# Patient Record
Sex: MALE | Race: BLACK OR AFRICAN AMERICAN | NOT HISPANIC OR LATINO | Employment: FULL TIME | URBAN - METROPOLITAN AREA
[De-identification: names, ages, dates, MRNs, and addresses within clinical notes are randomized per-mention and may not be internally consistent; named-entity substitution may affect disease eponyms.]

---

## 2023-11-09 ENCOUNTER — APPOINTMENT (OUTPATIENT)
Dept: RADIOLOGY | Facility: HOSPITAL | Age: 53
DRG: 690 | End: 2023-11-09

## 2023-11-09 ENCOUNTER — HOSPITAL ENCOUNTER (INPATIENT)
Facility: HOSPITAL | Age: 53
LOS: 3 days | Discharge: HOME | DRG: 690 | End: 2023-11-13
Attending: EMERGENCY MEDICINE | Admitting: INTERNAL MEDICINE

## 2023-11-09 DIAGNOSIS — R91.8 LUNG NODULES: ICD-10-CM

## 2023-11-09 DIAGNOSIS — K74.60 HEPATIC CIRRHOSIS, UNSPECIFIED HEPATIC CIRRHOSIS TYPE, UNSPECIFIED WHETHER ASCITES PRESENT (MULTI): ICD-10-CM

## 2023-11-09 DIAGNOSIS — E11.9 INSULIN DEPENDENT TYPE 2 DIABETES MELLITUS (MULTI): ICD-10-CM

## 2023-11-09 DIAGNOSIS — Z79.4 INSULIN DEPENDENT TYPE 2 DIABETES MELLITUS (MULTI): ICD-10-CM

## 2023-11-09 DIAGNOSIS — N49.2 CELLULITIS OF SCROTUM: ICD-10-CM

## 2023-11-09 DIAGNOSIS — I10 PRIMARY HYPERTENSION: ICD-10-CM

## 2023-11-09 DIAGNOSIS — N34.0 URETHRAL ABSCESS: Primary | ICD-10-CM

## 2023-11-09 DIAGNOSIS — E78.5 HYPERLIPIDEMIA, UNSPECIFIED HYPERLIPIDEMIA TYPE: ICD-10-CM

## 2023-11-09 LAB
ALBUMIN SERPL BCP-MCNC: 4.1 G/DL (ref 3.4–5)
ALP SERPL-CCNC: 79 U/L (ref 33–120)
ALT SERPL W P-5'-P-CCNC: 13 U/L (ref 10–52)
ANION GAP BLDV CALCULATED.4IONS-SCNC: 9 MMOL/L (ref 10–25)
ANION GAP SERPL CALC-SCNC: 18 MMOL/L (ref 10–20)
APPEARANCE UR: ABNORMAL
AST SERPL W P-5'-P-CCNC: 9 U/L (ref 9–39)
BASE EXCESS BLDV CALC-SCNC: 2.3 MMOL/L (ref -2–3)
BASOPHILS # BLD AUTO: 0.07 X10*3/UL (ref 0–0.1)
BASOPHILS NFR BLD AUTO: 0.5 %
BILIRUB SERPL-MCNC: 0.5 MG/DL (ref 0–1.2)
BILIRUB UR STRIP.AUTO-MCNC: NEGATIVE MG/DL
BODY TEMPERATURE: 37 DEGREES CELSIUS
BUN SERPL-MCNC: 18 MG/DL (ref 6–23)
CA-I BLDV-SCNC: 1.17 MMOL/L (ref 1.1–1.33)
CALCIUM SERPL-MCNC: 9.2 MG/DL (ref 8.6–10.6)
CHLORIDE BLDV-SCNC: 98 MMOL/L (ref 98–107)
CHLORIDE SERPL-SCNC: 99 MMOL/L (ref 98–107)
CO2 SERPL-SCNC: 20 MMOL/L (ref 21–32)
COLOR UR: YELLOW
CREAT SERPL-MCNC: 1.05 MG/DL (ref 0.5–1.3)
EOSINOPHIL # BLD AUTO: 0.12 X10*3/UL (ref 0–0.7)
EOSINOPHIL NFR BLD AUTO: 0.9 %
ERYTHROCYTE [DISTWIDTH] IN BLOOD BY AUTOMATED COUNT: 14 % (ref 11.5–14.5)
GFR SERPL CREATININE-BSD FRML MDRD: 85 ML/MIN/1.73M*2
GLUCOSE BLDV-MCNC: 324 MG/DL (ref 74–99)
GLUCOSE SERPL-MCNC: 284 MG/DL (ref 74–99)
GLUCOSE UR STRIP.AUTO-MCNC: ABNORMAL MG/DL
HCO3 BLDV-SCNC: 27.2 MMOL/L (ref 22–26)
HCT VFR BLD AUTO: 47.3 % (ref 41–52)
HCT VFR BLD EST: 48 % (ref 41–52)
HGB BLD-MCNC: 15.3 G/DL (ref 13.5–17.5)
HGB BLDV-MCNC: 16 G/DL (ref 13.5–17.5)
HOLD SPECIMEN: NORMAL
IMM GRANULOCYTES # BLD AUTO: 0.08 X10*3/UL (ref 0–0.7)
IMM GRANULOCYTES NFR BLD AUTO: 0.6 % (ref 0–0.9)
KETONES UR STRIP.AUTO-MCNC: ABNORMAL MG/DL
LACTATE BLDV-SCNC: 1.3 MMOL/L (ref 0.4–2)
LEUKOCYTE ESTERASE UR QL STRIP.AUTO: ABNORMAL
LYMPHOCYTES # BLD AUTO: 2.7 X10*3/UL (ref 1.2–4.8)
LYMPHOCYTES NFR BLD AUTO: 20.8 %
MCH RBC QN AUTO: 26 PG (ref 26–34)
MCHC RBC AUTO-ENTMCNC: 32.3 G/DL (ref 32–36)
MCV RBC AUTO: 80 FL (ref 80–100)
MONOCYTES # BLD AUTO: 1.28 X10*3/UL (ref 0.1–1)
MONOCYTES NFR BLD AUTO: 9.9 %
NEUTROPHILS # BLD AUTO: 8.7 X10*3/UL (ref 1.2–7.7)
NEUTROPHILS NFR BLD AUTO: 67.3 %
NITRITE UR QL STRIP.AUTO: NEGATIVE
NRBC BLD-RTO: 0 /100 WBCS (ref 0–0)
OXYHGB MFR BLDV: 71.5 % (ref 45–75)
PCO2 BLDV: 42 MM HG (ref 41–51)
PH BLDV: 7.42 PH (ref 7.33–7.43)
PH UR STRIP.AUTO: 6 [PH]
PLATELET # BLD AUTO: 198 X10*3/UL (ref 150–450)
PO2 BLDV: 46 MM HG (ref 35–45)
POTASSIUM BLDV-SCNC: 3.9 MMOL/L (ref 3.5–5.3)
POTASSIUM SERPL-SCNC: 4.1 MMOL/L (ref 3.5–5.3)
PROT SERPL-MCNC: 6.9 G/DL (ref 6.4–8.2)
PROT UR STRIP.AUTO-MCNC: ABNORMAL MG/DL
RBC # BLD AUTO: 5.88 X10*6/UL (ref 4.5–5.9)
RBC # UR STRIP.AUTO: ABNORMAL /UL
RBC #/AREA URNS AUTO: >20 /HPF
SAO2 % BLDV: 79 % (ref 45–75)
SODIUM BLDV-SCNC: 130 MMOL/L (ref 136–145)
SODIUM SERPL-SCNC: 133 MMOL/L (ref 136–145)
SP GR UR STRIP.AUTO: 1.03
SQUAMOUS #/AREA URNS AUTO: ABNORMAL /HPF
UROBILINOGEN UR STRIP.AUTO-MCNC: <2 MG/DL
WBC # BLD AUTO: 13 X10*3/UL (ref 4.4–11.3)
WBC #/AREA URNS AUTO: >50 /HPF

## 2023-11-09 PROCEDURE — 81001 URINALYSIS AUTO W/SCOPE: CPT | Performed by: STUDENT IN AN ORGANIZED HEALTH CARE EDUCATION/TRAINING PROGRAM

## 2023-11-09 PROCEDURE — 99285 EMERGENCY DEPT VISIT HI MDM: CPT

## 2023-11-09 PROCEDURE — 82947 ASSAY GLUCOSE BLOOD QUANT: CPT

## 2023-11-09 PROCEDURE — 85025 COMPLETE CBC W/AUTO DIFF WBC: CPT

## 2023-11-09 PROCEDURE — 36415 COLL VENOUS BLD VENIPUNCTURE: CPT

## 2023-11-09 PROCEDURE — 74177 CT ABD & PELVIS W/CONTRAST: CPT

## 2023-11-09 PROCEDURE — 96367 TX/PROPH/DG ADDL SEQ IV INF: CPT

## 2023-11-09 PROCEDURE — 87800 DETECT AGNT MULT DNA DIREC: CPT

## 2023-11-09 PROCEDURE — 96365 THER/PROPH/DIAG IV INF INIT: CPT

## 2023-11-09 PROCEDURE — 84295 ASSAY OF SERUM SODIUM: CPT

## 2023-11-09 PROCEDURE — 74177 CT ABD & PELVIS W/CONTRAST: CPT | Performed by: STUDENT IN AN ORGANIZED HEALTH CARE EDUCATION/TRAINING PROGRAM

## 2023-11-09 PROCEDURE — 82330 ASSAY OF CALCIUM: CPT

## 2023-11-09 PROCEDURE — 2550000001 HC RX 255 CONTRASTS: Performed by: EMERGENCY MEDICINE

## 2023-11-09 PROCEDURE — 2500000001 HC RX 250 WO HCPCS SELF ADMINISTERED DRUGS (ALT 637 FOR MEDICARE OP)

## 2023-11-09 PROCEDURE — 87536 HIV-1 QUANT&REVRSE TRNSCRPJ: CPT

## 2023-11-09 PROCEDURE — 99285 EMERGENCY DEPT VISIT HI MDM: CPT | Mod: 25 | Performed by: EMERGENCY MEDICINE

## 2023-11-09 PROCEDURE — 2500000004 HC RX 250 GENERAL PHARMACY W/ HCPCS (ALT 636 FOR OP/ED): Performed by: EMERGENCY MEDICINE

## 2023-11-09 PROCEDURE — 96366 THER/PROPH/DIAG IV INF ADDON: CPT

## 2023-11-09 PROCEDURE — 87086 URINE CULTURE/COLONY COUNT: CPT | Performed by: STUDENT IN AN ORGANIZED HEALTH CARE EDUCATION/TRAINING PROGRAM

## 2023-11-09 PROCEDURE — 2500000004 HC RX 250 GENERAL PHARMACY W/ HCPCS (ALT 636 FOR OP/ED)

## 2023-11-09 RX ORDER — MORPHINE SULFATE 4 MG/ML
4 INJECTION INTRAVENOUS ONCE
Status: COMPLETED | OUTPATIENT
Start: 2023-11-09 | End: 2023-11-10

## 2023-11-09 RX ORDER — VANCOMYCIN HYDROCHLORIDE 1 G/200ML
2000 INJECTION, SOLUTION INTRAVENOUS ONCE
Status: COMPLETED | OUTPATIENT
Start: 2023-11-09 | End: 2023-11-10

## 2023-11-09 RX ORDER — OXYCODONE AND ACETAMINOPHEN 5; 325 MG/1; MG/1
1 TABLET ORAL ONCE
Status: COMPLETED | OUTPATIENT
Start: 2023-11-09 | End: 2023-11-09

## 2023-11-09 RX ADMIN — PIPERACILLIN SODIUM AND TAZOBACTAM SODIUM 4.5 G: 4; .5 INJECTION, SOLUTION INTRAVENOUS at 20:55

## 2023-11-09 RX ADMIN — SODIUM CHLORIDE 1000 ML: 9 INJECTION, SOLUTION INTRAVENOUS at 20:30

## 2023-11-09 RX ADMIN — IOHEXOL 100 ML: 350 INJECTION, SOLUTION INTRAVENOUS at 23:13

## 2023-11-09 RX ADMIN — OXYCODONE HYDROCHLORIDE AND ACETAMINOPHEN 1 TABLET: 5; 325 TABLET ORAL at 19:01

## 2023-11-09 RX ADMIN — VANCOMYCIN HYDROCHLORIDE 2000 MG: 1 INJECTION, SOLUTION INTRAVENOUS at 22:14

## 2023-11-09 ASSESSMENT — COLUMBIA-SUICIDE SEVERITY RATING SCALE - C-SSRS
1. IN THE PAST MONTH, HAVE YOU WISHED YOU WERE DEAD OR WISHED YOU COULD GO TO SLEEP AND NOT WAKE UP?: NO
6. HAVE YOU EVER DONE ANYTHING, STARTED TO DO ANYTHING, OR PREPARED TO DO ANYTHING TO END YOUR LIFE?: NO
2. HAVE YOU ACTUALLY HAD ANY THOUGHTS OF KILLING YOURSELF?: NO

## 2023-11-09 NOTE — ED TRIAGE NOTES
Reports burning during urination, increased frequency, pain under scrotum. Pt states he had a procedure to remove an abscess in his urethra. Denies concern for STDs.

## 2023-11-09 NOTE — ED PROVIDER NOTES
"CC: UTI symptoms  HPI:   Elliot Earl is a 53 y.o. year old male patient with PMH of epididymal abscess/perianal cellulitis and abscess with frequent UTI's, insulin dependent T2DM, HTN, HLD whom presents to the ED with 5 day history of UTI symptoms and progressively worsening left scrotal pain. More specifically, the patient complains of increased urinary frequency, burning dysuria and foul-smelling urine. He reports that he has been urinating every 2 hours. No associated fever, chills, abdominal or flank pain, hematuria, urinary retention, weak flow or hesitancy. No headache, dizziness, vision changes, neck pain, chest pain, shortness of breath, vomiting, or diarrhea. No numbness or tingling. He states no concerns for STD's as he has only been sexually active with his wife of 20 years. Denies any urethral discharge or penile pain, genital sores, rashes.    He reports that treatment with tylenol is ineffective, last dose was at 0600 this AM. Patient expressed concern as he has had similar symptoms in the past and was ultimately diagnosed with a urethral abscess (2-3 years ago) which has continued to \"intermittently flare-up over the past couple years. He has been actively following with urology, Dr. Quiroz for this issue, next follow-up appointment is scheduled on 12/11/23.      PMHx:  PSH: reviewed per EMR  Allergies: Reviewed per EMR  Medications: Reviewed per EMR  FH: Noncontributory  Social Hx: Denies tobacco. Denies EtOH, illicit substance use.      ROS: All other systems were reviewed and negative.    Physical Exam:   GENERAL: Patient is well-appearing, cooperative. Vitals noted, NAD. Afebrile  HEAD: NC/AT. Neck is supple, full ROM intact.  EENMT: PERRL. EOMI, anicteric sclera. Hearing grossly intact. MMM. Normal phonation.  CV: Regular rate & rhythm. S1/S2 present. No murmur, gallops or rubs.  PULM: CTAB with normal respiratory effort. No wheezes, rales or rhonchi.  ABDOMEN: Soft, NTTP, " non-distended. No peritoneal signs. BS+ x 4. No HSM or pulsatile masses.  : Circumcised male with yellow purulent discharge actively draining from the urethra. (+) Tenderness to palpation and very slight swelling to left perianal region in between scrotum and rectum; no areas of induration appreciated. Penile shaft is normal without rash, lesions or TTP. Vertical, nontender testicles. No epididymal tenderness or swelling appreciated. Cremasteric reflex present. Phren's sign is negative. No inguinal lymphadenopathy or masses. No hernias. No ulcerations, lesions, rash. No external hemorrhoids seen.  EXTREMITIES: WWP, 2+ bilateral RPs and DPPs. No pitting LE edema.  SKIN: Intact. Cap refill <2s.  NEURO: AA&Ox3, Speech fluent. No focal deficits identified. Normal gait, Spontaneously ROYAL x 4. Answering questions appropriately.       Relevant Labs and Imaging Results  Labs Reviewed   URINALYSIS WITH REFLEX MICROSCOPIC AND CULTURE - Abnormal       Result Value    Color, Urine Yellow      Appearance, Urine Hazy (*)     Specific Gravity, Urine 1.032      pH, Urine 6.0      Protein, Urine 30 (1+) (*)     Glucose, Urine >=500 (3+) (*)     Blood, Urine LARGE (3+) (*)     Ketones, Urine 20 (1+) (*)     Bilirubin, Urine NEGATIVE      Urobilinogen, Urine <2.0      Nitrite, Urine NEGATIVE      Leukocyte Esterase, Urine LARGE (3+) (*)    MICROSCOPIC ONLY, URINE - Abnormal    WBC, Urine >50 (*)     RBC, Urine >20 (*)     Squamous Epithelial Cells, Urine 1-9 (SPARSE)     COMPREHENSIVE METABOLIC PANEL - Abnormal    Glucose 284 (*)     Sodium 133 (*)     Potassium 4.1      Chloride 99      Bicarbonate 20 (*)     Anion Gap 18      Urea Nitrogen 18      Creatinine 1.05      eGFR 85      Calcium 9.2      Albumin 4.1      Alkaline Phosphatase 79      Total Protein 6.9      AST 9      Bilirubin, Total 0.5      ALT 13     CBC WITH AUTO DIFFERENTIAL - Abnormal    WBC 13.0 (*)     nRBC 0.0      RBC 5.88      Hemoglobin 15.3      Hematocrit  47.3      MCV 80      MCH 26.0      MCHC 32.3      RDW 14.0      Platelets 198      Neutrophils % 67.3      Immature Granulocytes %, Automated 0.6      Lymphocytes % 20.8      Monocytes % 9.9      Eosinophils % 0.9      Basophils % 0.5      Neutrophils Absolute 8.70 (*)     Immature Granulocytes Absolute, Automated 0.08      Lymphocytes Absolute 2.70      Monocytes Absolute 1.28 (*)     Eosinophils Absolute 0.12      Basophils Absolute 0.07     BLOOD GAS VENOUS FULL PANEL UNSOLICITED - Abnormal    POCT pH, Venous 7.42      POCT pCO2, Venous 42      POCT pO2, Venous 46 (*)     POCT SO2, Venous 79 (*)     POCT Oxy Hemoglobin, Venous 71.5      POCT Hematocrit Calculated, Venous 48.0      POCT Sodium, Venous 130 (*)     POCT Potassium, Venous 3.9      POCT Chloride, Venous 98      POCT Ionized Calicum, Venous 1.17      POCT Glucose, Venous 324 (*)     POCT Lactate, Venous 1.3      POCT Base Excess, Venous 2.3      POCT HCO3 Calculated, Venous 27.2 (*)     POCT Hemoglobin, Venous 16.0      POCT Anion Gap, Venous 9.0 (*)     Patient Temperature 37.0     URINE CULTURE   URINALYSIS WITH REFLEX MICROSCOPIC AND CULTURE    Narrative:     The following orders were created for panel order Urinalysis with Reflex Microscopic and Culture.  Procedure                               Abnormality         Status                     ---------                               -----------         ------                     Urinalysis with Reflex M...[[id number]]  Abnormal            Final result               Extra Urine Gray Tube[[id number]]                            Final result                 Please view results for these tests on the individual orders.   EXTRA URINE GRAY TUBE    Extra Tube Hold for add-ons.     C. TRACHOMATIS + N. GONORRHOEAE, AMPLIFIED   TRICHOMONAS WET PREP REFLEX TO PCR   BLOOD GAS VENOUS FULL PANEL     CT abdomen pelvis w IV contrast  Narrative: STUDY:  CT ABDOMEN PELVIS W IV CONTRAST;  11/9/2023 11:13 pm     "  INDICATION:  Signs/Symptoms:UTI symptoms and urethral discharge with history of  epidydmal, perianal and perirectal abscess. Concerning for abscess.      Per EMR: 5 day history of urinary symptoms with progressively  worsening left scrotal pain. Patient was diagnosed with a urethral  abscess about 2-3 years ago, which has continued to \"intermittently  flare up over the past couple years\" . Physical exam: Yellow purulent  discharge actively draining from the urethra. Tenderness to palpation  with slight swelling to the left perianal region between the scrotum  and rectum, concerning for abscess and/or cellulitis.      COMPARISON:  CT abdomen and pelvis with IV contrast dated 10/17/2022 and  11/06/2021.      ACCESSION NUMBER(S):  [id number]      ORDERING CLINICIAN:  ALICE JESUS      TECHNIQUE:  Contiguous axial images of the abdomen and pelvis were obtained after  the intravenous administration of 100 mL Omnipaque 350 contrast.  Coronal and sagittal reformatted images were reconstructed from the  axial data.      FINDINGS:  LOWER CHEST: No acute abnormality.      ABDOMEN/PELVIS:      ABDOMINAL WALL: No significant abnormality.      LIVER: No significant parenchymal perfusion abnormality. Diffuse  decreased attenuation of the hepatic parenchyma, consistent with  hepatic steatosis. The liver demonstrates a slightly nodular contour.      BILE DUCTS: No significant intrahepatic or extrahepatic dilatation.      GALLBLADDER: No significant abnormality.      PANCREAS: No significant abnormality.      SPLEEN: No significant abnormality. Note is made of an accessory  splenule.      ADRENALS: No significant abnormality.      KIDNEYS, URETERS, BLADDER: No significant abnormality.      REPRODUCTIVE ORGANS: No significant abnormality. There is linear  hypodense fluid collection at the base of the penis measuring about  4.1 x 0.6 cm (series 201, image 162). There is a similar additional  focus more distally measuring about " 2.6 x 0.8 cm (series 201, image  164). These findings were not present on prior exam from 10/17/2022,  however were similar to the findings seen CT abdomen and pelvis dated  11/06/2021. There is no evidence of internal air. There is a mild  component of associated left perineal subcutaneous stranding and  edema. Additionally, the scrotal skin appears thickened, particularly  on the left up to about 0.7 cm. There is a fluid collection within  the scrotum, separate from the testicles, measuring about 3.6 x 1.9  cm. There is a rim enhancing wall surrounding this collection. This  finding was not present on prior exam is from 10/17/2022 and  11/06/2021.      VESSELS: No significant abnormality.      RETROPERITONEUM/LYMPH NODES: No enlarged lymph nodes. No acute  retroperitoneal abnormality.      BOWEL/MESENTERY/PERITONEUM:  No inflammatory bowel wall thickening or  dilatation. Normal appendix. No ascites, free air, or intraperitoneal  fluid collection.      MUSCULOSKELETAL: No acute osseous abnormality. Mild degenerative  changes of the visualized thoracolumbar spine.      Impression: 1. Findings consistent with a urethral abscess at the base of the  penis. There is an additional hypodense focus more distally, with  suggestion of communication between the 2 foci. There are associated  inflammatory changes of the left perineum.  2. Thickening of the scrotal skin, particularly on the left. Findings  are concerning for component of scrotal cellulitis.  3. Rim enhancing scrotal fluid collection, concerning for a component  of intra-scrotal abscess.          I personally reviewed the images/study and I agree with the findings  as stated by Antonio Masterson MD. This study was interpreted at  [facility], [place], [place]          Dictation workstation:   [id number]      -------------------------------------------------------------------------------------------    Medical Decision Making  Triage  notes and vital signs were reviewed. History and physical exam were performed and the patient was staffed with the attending, Dr. Maura Nunez. I also reviewed recent and relevant outside records for thorough HPI.    Assessment & Plan/ED Course:   This is a 53 year old male presenting with 5 day history of UTI symptoms and scrotal pain concerning for urethritis.   VSS on arrival. Patient is visibly uncomfortable while sitting upright in exam chair secondary to scrotal pain. He is otherwise non-toxic appearing and in no acute distress. Physical exam revealed benign abdomen with no CVAT or palpable hernias.  exam performed with chaperone present revealed  yellow purulent discharge actively draining from the urethral as well as TTP with slight swelling to the left perianal region between his scrotum and rectum concerning for abscess and/or cellulitis. Penis, shaft and epididymis were otherwise normal and non-tender to palpation.   Differential diagnoses considered include: Acute epididymitis, Prostatitis, urinary tract infection, perianal abscecss, STD urethritis. Low suspicion for testicular torsion given presence of cremasteric reflex and absence of any testicular pain on exam. Although patient is at low risk for STI, I am concerned for possible GC/Chlamydia given presence of purulent urethral discharge noted on examination. After shannan discussion, patient is agreeable to STI urine testing. Urology consulted for recommendations due to concern for recurrent perirectal/perianal/epididymal abscess. He was given PO percocet for pain control. After discussion of case with ED attending, Dr. Maura Nunez, patient was also started on IV Vancomycin and Zosyn due to concerns for scrotal cellulitis and risk for MRSA.    Problems Addressed:  Cellulitis of scrotum: acute illness or injury  Urethral abscess: complicated acute illness or injury    Amount and/or Complexity of Data Reviewed  External Data Reviewed: labs, radiology  and notes.  Labs: ordered. Decision-making details documented in ED Course.  Radiology: ordered and independent interpretation performed. Decision-making details documented in ED Course.  Discussion of management or test interpretation with external provider(s): ED attending, Dr. Nunez and on-call urologist, Dr. Katie velazco whom both agreed that patient should be admitted for IV antibiotics considering his history of diabetes and multiple recurrent genito-urinary abscesses.     Risk  Prescription drug management.  Parenteral controlled substances.  Decision regarding hospitalization.      ED Course as of 11/10/23 0224   u Nov 09, 2023 2154 IV vancomycin and Zosyn started for broad-spectrum coverage due to concern for recurrent genitourinary abscess. [MV]   2156 Urinalysis with Reflex Microscopic and Culture(!)  UA appears infected with 3+ LE and presence of WBC's and large blood in the urine. He was also noted to have glucosuria with > 500 glucose and 1+ ketones in the urine. Therefore will add on CMP, CBC and VBG to rule out DKA [MV]   2158 Comprehensive metabolic panel(!)  CMP showed elevated glucose at 284 without evidence of DKA on VBG. Lactate is 1.3.  [MV]   2159 CBC revealed mild leukocytosis at 13.0 with neutrophil predominance [MV]   2348 On re-evaluation, patient given IV morphine due to ineffective pain relief with percocet. He was informed about lab and UA results. [MV]   Fri Nov 10, 2023   0040 CT scan of abdomen pelvis showed a urethral abscess at the base of the penis with additional hypodense focus distally suggestive of communication between the 2 foci as well as thickening of scrotal skin especially on the left concerning for scrotal cellulitis and rim enhancing scrotal fluid collection measuring 3.6 x 1.9 cm suggestive of intra-scrotal abscess. CT imaging results discussed with patient. Urology (Katie Velazco) consulted for I&D whom recommended admission for IV antibiotic therapy considering  patient is diabetic with history of recurrent genitourinary abscesses. [MV]      ED Course User Index  [MV] Monica Vazquez PA-C         Diagnoses as of 11/10/23 0224   Urethral abscess   Cellulitis of scrotum       Clinical Impression: Urethral abscess and scrotal cellulitis    Disposition: Handed off to oncoming ED provider. Dr. Conte pending I&D by urology. After discussion with both the ED attending, Dr. Nunez and urologist, it was recommended that patient be admitted to medicine for continued IV antibiotics and monitoring with urology to follow-up during Am rounds.       Monica Vazquez PA-C  [facility]  [facility]    Disclaimer: This note was dictated by speech recognition. Minor errors in transcription may be present. Please call if questions.  -------------------------------------------------------------------------------------------         Monica Vazquez PA-C  11/10/23 0224       Maura Nunez MD  11/10/23 1011

## 2023-11-10 ENCOUNTER — APPOINTMENT (OUTPATIENT)
Dept: RADIOLOGY | Facility: HOSPITAL | Age: 53
DRG: 690 | End: 2023-11-10

## 2023-11-10 PROBLEM — N34.0 URETHRAL ABSCESS: Status: ACTIVE | Noted: 2023-11-10

## 2023-11-10 LAB
BACTERIA UR CULT: NORMAL
C TRACH RRNA SPEC QL NAA+PROBE: NEGATIVE
CRP SERPL-MCNC: 11.41 MG/DL
ERYTHROCYTE [SEDIMENTATION RATE] IN BLOOD BY WESTERGREN METHOD: 46 MM/H (ref 0–20)
EST. AVERAGE GLUCOSE BLD GHB EST-MCNC: 286 MG/DL
GLUCOSE BLD MANUAL STRIP-MCNC: 237 MG/DL (ref 74–99)
GLUCOSE BLD MANUAL STRIP-MCNC: 384 MG/DL (ref 74–99)
GLUCOSE BLD MANUAL STRIP-MCNC: 449 MG/DL (ref 74–99)
GLUCOSE BLD MANUAL STRIP-MCNC: 534 MG/DL (ref 74–99)
HBA1C MFR BLD: 11.6 %
N GONORRHOEA DNA SPEC QL PROBE+SIG AMP: NEGATIVE

## 2023-11-10 PROCEDURE — 86140 C-REACTIVE PROTEIN: CPT

## 2023-11-10 PROCEDURE — 96372 THER/PROPH/DIAG INJ SC/IM: CPT

## 2023-11-10 PROCEDURE — 87116 MYCOBACTERIA CULTURE: CPT

## 2023-11-10 PROCEDURE — 2500000004 HC RX 250 GENERAL PHARMACY W/ HCPCS (ALT 636 FOR OP/ED)

## 2023-11-10 PROCEDURE — 87385 HISTOPLASMA CAPSUL AG IA: CPT

## 2023-11-10 PROCEDURE — 71260 CT THORAX DX C+: CPT | Performed by: RADIOLOGY

## 2023-11-10 PROCEDURE — 36415 COLL VENOUS BLD VENIPUNCTURE: CPT

## 2023-11-10 PROCEDURE — 99223 1ST HOSP IP/OBS HIGH 75: CPT

## 2023-11-10 PROCEDURE — 87015 SPECIMEN INFECT AGNT CONCNTJ: CPT

## 2023-11-10 PROCEDURE — 2550000001 HC RX 255 CONTRASTS: Performed by: INTERNAL MEDICINE

## 2023-11-10 PROCEDURE — 2500000001 HC RX 250 WO HCPCS SELF ADMINISTERED DRUGS (ALT 637 FOR MEDICARE OP)

## 2023-11-10 PROCEDURE — 87449 NOS EACH ORGANISM AG IA: CPT

## 2023-11-10 PROCEDURE — 2060000001 HC INTERMEDIATE ICU ROOM DAILY

## 2023-11-10 PROCEDURE — 71045 X-RAY EXAM CHEST 1 VIEW: CPT | Mod: FY

## 2023-11-10 PROCEDURE — 2500000002 HC RX 250 W HCPCS SELF ADMINISTERED DRUGS (ALT 637 FOR MEDICARE OP, ALT 636 FOR OP/ED)

## 2023-11-10 PROCEDURE — 87206 SMEAR FLUORESCENT/ACID STAI: CPT

## 2023-11-10 PROCEDURE — 83036 HEMOGLOBIN GLYCOSYLATED A1C: CPT

## 2023-11-10 PROCEDURE — 71045 X-RAY EXAM CHEST 1 VIEW: CPT | Performed by: STUDENT IN AN ORGANIZED HEALTH CARE EDUCATION/TRAINING PROGRAM

## 2023-11-10 PROCEDURE — 76870 US EXAM SCROTUM: CPT | Performed by: RADIOLOGY

## 2023-11-10 PROCEDURE — 93975 VASCULAR STUDY: CPT

## 2023-11-10 PROCEDURE — 82947 ASSAY GLUCOSE BLOOD QUANT: CPT

## 2023-11-10 PROCEDURE — 87305 ASPERGILLUS AG IA: CPT

## 2023-11-10 PROCEDURE — 86612 BLASTOMYCES ANTIBODY: CPT

## 2023-11-10 PROCEDURE — 2500000004 HC RX 250 GENERAL PHARMACY W/ HCPCS (ALT 636 FOR OP/ED): Performed by: EMERGENCY MEDICINE

## 2023-11-10 PROCEDURE — 86635 COCCIDIOIDES ANTIBODY: CPT

## 2023-11-10 PROCEDURE — 85652 RBC SED RATE AUTOMATED: CPT

## 2023-11-10 PROCEDURE — 71260 CT THORAX DX C+: CPT

## 2023-11-10 PROCEDURE — 87102 FUNGUS ISOLATION CULTURE: CPT

## 2023-11-10 RX ORDER — AMLODIPINE BESYLATE 10 MG/1
10 TABLET ORAL DAILY
Status: ON HOLD | COMMUNITY
Start: 2019-10-17 | End: 2023-11-13 | Stop reason: SDUPTHER

## 2023-11-10 RX ORDER — OXYCODONE HYDROCHLORIDE 5 MG/1
5 TABLET ORAL EVERY 6 HOURS PRN
Status: DISCONTINUED | OUTPATIENT
Start: 2023-11-10 | End: 2023-11-13 | Stop reason: HOSPADM

## 2023-11-10 RX ORDER — DEXTROSE MONOHYDRATE 100 MG/ML
0.3 INJECTION, SOLUTION INTRAVENOUS ONCE AS NEEDED
Status: DISCONTINUED | OUTPATIENT
Start: 2023-11-10 | End: 2023-11-13 | Stop reason: HOSPADM

## 2023-11-10 RX ORDER — INSULIN GLARGINE 100 [IU]/ML
20 INJECTION, SOLUTION SUBCUTANEOUS NIGHTLY
Status: DISCONTINUED | OUTPATIENT
Start: 2023-11-10 | End: 2023-11-10

## 2023-11-10 RX ORDER — INSULIN GLARGINE 100 [IU]/ML
40 INJECTION, SOLUTION SUBCUTANEOUS NIGHTLY
Status: DISCONTINUED | OUTPATIENT
Start: 2023-11-10 | End: 2023-11-11

## 2023-11-10 RX ORDER — AMLODIPINE BESYLATE 10 MG/1
10 TABLET ORAL DAILY
Status: DISCONTINUED | OUTPATIENT
Start: 2023-11-10 | End: 2023-11-13 | Stop reason: HOSPADM

## 2023-11-10 RX ORDER — CYCLOBENZAPRINE HCL 10 MG
10 TABLET ORAL 3 TIMES DAILY PRN
COMMUNITY
End: 2023-11-13 | Stop reason: HOSPADM

## 2023-11-10 RX ORDER — LOSARTAN POTASSIUM 100 MG/1
100 TABLET ORAL DAILY
Status: ON HOLD | COMMUNITY
Start: 2020-10-14 | End: 2023-11-13 | Stop reason: SDUPTHER

## 2023-11-10 RX ORDER — METOPROLOL SUCCINATE 25 MG/1
25 TABLET, EXTENDED RELEASE ORAL DAILY
Status: ON HOLD | COMMUNITY
Start: 2021-04-28 | End: 2023-11-13 | Stop reason: SDUPTHER

## 2023-11-10 RX ORDER — NAPROXEN SODIUM 220 MG/1
81 TABLET, FILM COATED ORAL DAILY
Status: DISCONTINUED | OUTPATIENT
Start: 2023-11-10 | End: 2023-11-13 | Stop reason: HOSPADM

## 2023-11-10 RX ORDER — ENOXAPARIN SODIUM 100 MG/ML
40 INJECTION SUBCUTANEOUS EVERY 12 HOURS SCHEDULED
Status: DISCONTINUED | OUTPATIENT
Start: 2023-11-10 | End: 2023-11-13 | Stop reason: HOSPADM

## 2023-11-10 RX ORDER — INSULIN GLARGINE 100 [IU]/ML
90 INJECTION, SOLUTION SUBCUTANEOUS NIGHTLY
Status: ON HOLD | COMMUNITY
Start: 2019-03-08 | End: 2023-11-13 | Stop reason: SDUPTHER

## 2023-11-10 RX ORDER — NAPROXEN SODIUM 220 MG/1
81 TABLET, FILM COATED ORAL DAILY
Status: ON HOLD | COMMUNITY
Start: 2017-10-09 | End: 2023-11-13 | Stop reason: SDUPTHER

## 2023-11-10 RX ORDER — VANCOMYCIN HYDROCHLORIDE 1 G/200ML
1000 INJECTION, SOLUTION INTRAVENOUS EVERY 12 HOURS
Status: DISCONTINUED | OUTPATIENT
Start: 2023-11-10 | End: 2023-11-11

## 2023-11-10 RX ORDER — ROSUVASTATIN CALCIUM 40 MG/1
40 TABLET, COATED ORAL DAILY
Status: ON HOLD | COMMUNITY
End: 2023-11-13 | Stop reason: SDUPTHER

## 2023-11-10 RX ORDER — INSULIN LISPRO 100 [IU]/ML
0-10 INJECTION, SOLUTION INTRAVENOUS; SUBCUTANEOUS
Status: DISCONTINUED | OUTPATIENT
Start: 2023-11-10 | End: 2023-11-12

## 2023-11-10 RX ORDER — DEXTROSE 50 % IN WATER (D50W) INTRAVENOUS SYRINGE
25
Status: DISCONTINUED | OUTPATIENT
Start: 2023-11-10 | End: 2023-11-13 | Stop reason: HOSPADM

## 2023-11-10 RX ORDER — ATORVASTATIN CALCIUM 80 MG/1
80 TABLET, FILM COATED ORAL NIGHTLY
Status: DISCONTINUED | OUTPATIENT
Start: 2023-11-10 | End: 2023-11-13 | Stop reason: HOSPADM

## 2023-11-10 RX ORDER — POLYETHYLENE GLYCOL 3350 17 G/17G
17 POWDER, FOR SOLUTION ORAL DAILY
Status: DISCONTINUED | OUTPATIENT
Start: 2023-11-10 | End: 2023-11-13 | Stop reason: HOSPADM

## 2023-11-10 RX ADMIN — ENOXAPARIN SODIUM 40 MG: 100 INJECTION SUBCUTANEOUS at 10:24

## 2023-11-10 RX ADMIN — INSULIN LISPRO 10 UNITS: 100 INJECTION, SOLUTION INTRAVENOUS; SUBCUTANEOUS at 17:29

## 2023-11-10 RX ADMIN — AMLODIPINE BESYLATE 10 MG: 10 TABLET ORAL at 10:23

## 2023-11-10 RX ADMIN — VANCOMYCIN HYDROCHLORIDE 1000 MG: 1 INJECTION, SOLUTION INTRAVENOUS at 22:24

## 2023-11-10 RX ADMIN — PIPERACILLIN SODIUM AND TAZOBACTAM SODIUM 4.5 G: 4; .5 INJECTION, SOLUTION INTRAVENOUS at 21:35

## 2023-11-10 RX ADMIN — MORPHINE SULFATE 4 MG: 4 INJECTION INTRAVENOUS at 00:01

## 2023-11-10 RX ADMIN — ASPIRIN 81 MG CHEWABLE TABLET 81 MG: 81 TABLET CHEWABLE at 10:23

## 2023-11-10 RX ADMIN — INSULIN GLARGINE 40 UNITS: 100 INJECTION, SOLUTION SUBCUTANEOUS at 21:35

## 2023-11-10 RX ADMIN — ENOXAPARIN SODIUM 40 MG: 100 INJECTION SUBCUTANEOUS at 21:35

## 2023-11-10 RX ADMIN — PIPERACILLIN SODIUM AND TAZOBACTAM SODIUM 4.5 G: 4; .5 INJECTION, SOLUTION INTRAVENOUS at 16:17

## 2023-11-10 RX ADMIN — ATORVASTATIN CALCIUM 80 MG: 80 TABLET, FILM COATED ORAL at 21:35

## 2023-11-10 RX ADMIN — INSULIN LISPRO 4 UNITS: 100 INJECTION, SOLUTION INTRAVENOUS; SUBCUTANEOUS at 12:21

## 2023-11-10 RX ADMIN — IOHEXOL 80 ML: 350 INJECTION, SOLUTION INTRAVENOUS at 06:48

## 2023-11-10 RX ADMIN — PIPERACILLIN SODIUM AND TAZOBACTAM SODIUM 4.5 G: 4; .5 INJECTION, SOLUTION INTRAVENOUS at 10:24

## 2023-11-10 RX ADMIN — PIPERACILLIN SODIUM AND TAZOBACTAM SODIUM 4.5 G: 4; .5 INJECTION, SOLUTION INTRAVENOUS at 04:05

## 2023-11-10 RX ADMIN — VANCOMYCIN HYDROCHLORIDE 1000 MG: 1 INJECTION, SOLUTION INTRAVENOUS at 12:19

## 2023-11-10 SDOH — SOCIAL STABILITY: SOCIAL INSECURITY: WERE YOU ABLE TO COMPLETE ALL THE BEHAVIORAL HEALTH SCREENINGS?: YES

## 2023-11-10 SDOH — SOCIAL STABILITY: SOCIAL INSECURITY: HAVE YOU HAD THOUGHTS OF HARMING ANYONE ELSE?: NO

## 2023-11-10 SDOH — SOCIAL STABILITY: SOCIAL INSECURITY: DO YOU FEEL UNSAFE GOING BACK TO THE PLACE WHERE YOU ARE LIVING?: NO

## 2023-11-10 SDOH — SOCIAL STABILITY: SOCIAL INSECURITY: HAS ANYONE EVER THREATENED TO HURT YOUR FAMILY OR YOUR PETS?: NO

## 2023-11-10 SDOH — SOCIAL STABILITY: SOCIAL INSECURITY: DOES ANYONE TRY TO KEEP YOU FROM HAVING/CONTACTING OTHER FRIENDS OR DOING THINGS OUTSIDE YOUR HOME?: NO

## 2023-11-10 SDOH — SOCIAL STABILITY: SOCIAL INSECURITY: ARE THERE ANY APPARENT SIGNS OF INJURIES/BEHAVIORS THAT COULD BE RELATED TO ABUSE/NEGLECT?: NO

## 2023-11-10 SDOH — SOCIAL STABILITY: SOCIAL INSECURITY: DO YOU FEEL ANYONE HAS EXPLOITED OR TAKEN ADVANTAGE OF YOU FINANCIALLY OR OF YOUR PERSONAL PROPERTY?: NO

## 2023-11-10 SDOH — SOCIAL STABILITY: SOCIAL INSECURITY: ABUSE: ADULT

## 2023-11-10 SDOH — SOCIAL STABILITY: SOCIAL INSECURITY: ARE YOU OR HAVE YOU BEEN THREATENED OR ABUSED PHYSICALLY, EMOTIONALLY, OR SEXUALLY BY ANYONE?: NO

## 2023-11-10 ASSESSMENT — ENCOUNTER SYMPTOMS
DIARRHEA: 0
CONSTIPATION: 0
NAUSEA: 0
FLANK PAIN: 0
FEVER: 0
HEMATURIA: 0
DYSURIA: 1
SHORTNESS OF BREATH: 0
ACTIVITY CHANGE: 0
APPETITE CHANGE: 0
FREQUENCY: 1
AGITATION: 0
CHILLS: 0
COLOR CHANGE: 0
VOMITING: 0
PALPITATIONS: 0
LIGHT-HEADEDNESS: 0
CONFUSION: 0

## 2023-11-10 ASSESSMENT — COGNITIVE AND FUNCTIONAL STATUS - GENERAL
DAILY ACTIVITIY SCORE: 24
MOBILITY SCORE: 24
PATIENT BASELINE BEDBOUND: NO
DAILY ACTIVITIY SCORE: 24
MOBILITY SCORE: 24

## 2023-11-10 ASSESSMENT — COLUMBIA-SUICIDE SEVERITY RATING SCALE - C-SSRS
2. HAVE YOU ACTUALLY HAD ANY THOUGHTS OF KILLING YOURSELF?: NO
6. HAVE YOU EVER DONE ANYTHING, STARTED TO DO ANYTHING, OR PREPARED TO DO ANYTHING TO END YOUR LIFE?: NO
1. IN THE PAST MONTH, HAVE YOU WISHED YOU WERE DEAD OR WISHED YOU COULD GO TO SLEEP AND NOT WAKE UP?: NO

## 2023-11-10 ASSESSMENT — LIFESTYLE VARIABLES
HAS A RELATIVE, FRIEND, DOCTOR, OR ANOTHER HEALTH PROFESSIONAL EXPRESSED CONCERN ABOUT YOUR DRINKING OR SUGGESTED YOU CUT DOWN: NO
PRESCIPTION_ABUSE_PAST_12_MONTHS: NO
HOW OFTEN DURING THE LAST YEAR HAVE YOU HAD A FEELING OF GUILT OR REMORSE AFTER DRINKING: NEVER
AUDIT-C TOTAL SCORE: 4
HOW OFTEN DO YOU HAVE A DRINK CONTAINING ALCOHOL: MONTHLY OR LESS
AUDIT TOTAL SCORE: 0
HOW OFTEN DURING THE LAST YEAR HAVE YOU FOUND THAT YOU WERE NOT ABLE TO STOP DRINKING ONCE YOU HAD STARTED: NEVER
HOW OFTEN DURING THE LAST YEAR HAVE YOU FAILED TO DO WHAT WAS NORMALLY EXPECTED FROM YOU BECAUSE OF DRINKING: NEVER
HAVE YOU OR SOMEONE ELSE BEEN INJURED AS A RESULT OF YOUR DRINKING: NO
HOW OFTEN DURING THE LAST YEAR HAVE YOU BEEN UNABLE TO REMEMBER WHAT HAPPENED THE NIGHT BEFORE BECAUSE YOU HAD BEEN DRINKING: NEVER
HOW OFTEN DO YOU HAVE 6 OR MORE DRINKS ON ONE OCCASION: MONTHLY
AUDIT TOTAL SCORE: 4
HOW MANY STANDARD DRINKS CONTAINING ALCOHOL DO YOU HAVE ON A TYPICAL DAY: 3 OR 4
SUBSTANCE_ABUSE_PAST_12_MONTHS: YES
AUDIT-C TOTAL SCORE: 4
HOW OFTEN DURING THE LAST YEAR HAVE YOU NEEDED AN ALCOHOLIC DRINK FIRST THING IN THE MORNING TO GET YOURSELF GOING AFTER A NIGHT OF HEAVY DRINKING: NEVER
SKIP TO QUESTIONS 9-10: 0

## 2023-11-10 ASSESSMENT — ACTIVITIES OF DAILY LIVING (ADL)
LACK_OF_TRANSPORTATION: NO
HEARING - LEFT EAR: FUNCTIONAL
BATHING: INDEPENDENT
BATHING: INDEPENDENT
TOILETING: INDEPENDENT
HEARING - RIGHT EAR: FUNCTIONAL
ADEQUATE_TO_COMPLETE_ADL: YES
ADEQUATE_TO_COMPLETE_ADL: YES
GROOMING: INDEPENDENT
TOILETING: INDEPENDENT
DRESSING YOURSELF: INDEPENDENT
HEARING - RIGHT EAR: FUNCTIONAL
DRESSING YOURSELF: INDEPENDENT
WALKS IN HOME: INDEPENDENT
JUDGMENT_ADEQUATE_SAFELY_COMPLETE_DAILY_ACTIVITIES: YES
HEARING - LEFT EAR: FUNCTIONAL
GROOMING: INDEPENDENT
JUDGMENT_ADEQUATE_SAFELY_COMPLETE_DAILY_ACTIVITIES: YES
WALKS IN HOME: INDEPENDENT
PATIENT'S MEMORY ADEQUATE TO SAFELY COMPLETE DAILY ACTIVITIES?: YES
FEEDING YOURSELF: INDEPENDENT
FEEDING YOURSELF: INDEPENDENT
PATIENT'S MEMORY ADEQUATE TO SAFELY COMPLETE DAILY ACTIVITIES?: YES

## 2023-11-10 ASSESSMENT — PAIN SCALES - GENERAL
PAINLEVEL_OUTOF10: 0 - NO PAIN
PAINLEVEL_OUTOF10: 8

## 2023-11-10 ASSESSMENT — PAIN - FUNCTIONAL ASSESSMENT
PAIN_FUNCTIONAL_ASSESSMENT: 0-10

## 2023-11-10 ASSESSMENT — PATIENT HEALTH QUESTIONNAIRE - PHQ9
1. LITTLE INTEREST OR PLEASURE IN DOING THINGS: NOT AT ALL
SUM OF ALL RESPONSES TO PHQ9 QUESTIONS 1 & 2: 0
2. FEELING DOWN, DEPRESSED OR HOPELESS: NOT AT ALL

## 2023-11-10 NOTE — HOSPITAL COURSE
Elliot Eral is a 53 y.o. 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 ED workup revealed leucocytosis, UA concerning for infection, CT/AP showed periurethral abscess and a new scrotal abscess., Urology followed inpatient without planned interventions. Scrotal U/S 11/10: mild skin thickening and described fluid collections re- findings demonstrated as per CT. Patient treated with IV antibiotics with vancomycin and Zosyn. Patient to be switched to po antibiotics with bactrim for 14 days of duration on discharge per ID recs. Urine culture negative. AFB culture, fungal culture, cryptococcal antigen collected, TB spot, histoplasma antigen pending- given CT showing innumerable incidental lung findings consistent with cavitary lesions vs nodules. Recommend repeat chest imaging in 6 months for pulmonary nodules.  Low suspicion for tuberculosis.  Counseled on smoking cessation. CT A/P with incidental findings of nodular liver contour and hepatic atrophy consisten with cirrhosis, although liver enzymes wnl. Patient will be arranged for PCP follow up to monitor results. Patient arranged for follow up with urology, GI, and pulmonology.

## 2023-11-10 NOTE — PROGRESS NOTES
"Elliot Earl is a 53 y.o. male on day 0 of admission presenting with Urethral abscess.    Subjective   Seen and evaluated bedside this morning.  Overall patient is in no acute distress.  Vital signs stable.  Patient resting comfortably this morning.  On interview, patient states that he has been having progressive perineal pain, dysuria, hematuria, mucus-like discharge from the urethra that has been worsening over the past 5 to 6 days.  He states that his dysuria has been more prominent over the past few hours.  And that movement and palpation of the perineum causes a sharp nonradiating pain.  Denies any testicular tenderness.  Cremasteric reflex intact.  Prehn sign negative.  Denies any drainage from the urethral tip this morning. Denies any visible fluid collections.  Scrotal ultrasound was ordered this morning.  At this point, no acute intervention planned by urology as there is no targetable areas of fluctuance amenable to I&D.  We will keep on IV antibiotics and sent for further testing.    Monogamous relationship with his wife.  No additional sexual contacts.  Patient works at a factory as a .  Patient admits to drinking 1/5 of vodka 3 times a week and smokes 1 pack/day.  Type 2 diabetes is uncontrolled with recent A1c of 11%.  Incidental findings on CT abdomen demonstrating innumerable, cavitary lesions concerning for fungal infections.  Relevant lab work was sent to evaluate and CT chest was ordered.    Review of Systems  Pertinent positives as above, all other systems negative.    Objective   Range of Vitals (last 24 hours)  Heart Rate:  [76-95]   Temp:  [36.7 °C (98 °F)-37.6 °C (99.6 °F)]   Resp:  [18]   BP: (113-153)/(79-92)   Height:  [170.2 cm (5' 7\")]   Weight:  [127 kg (280 lb)]   SpO2:  [92 %-98 %]   Daily Weight  11/09/23 : 127 kg (280 lb)    Body mass index is 43.85 kg/m².    Physical Exam  General: Patient is laying in bed, in no acute distress.   HEENT: Anicteric sclera, no " conjunctival pallor. No oral sores/ulcers. No dental caries.   CVS: S1/S2 audible, no M/G/R.  Resp: Breathing comfortably on RA. Normal vesicular breathing. No wheezing, crackles or rhonchi auscultated.  On 2 L nasal cannula  Abd: Non distended, non tender, no organomegaly was appreciated. +BS.  CNS: AAO x4. No gross focal deficits appreciated.  : No observable or palpable areas of fluctuance in the perineum, urethra, or bilateral testicles.  No purulent drainage from the urethral tip after manipulation.  Stable, clean postsurgical markings in the left inguinal space.  No palpable lymphadenopathy.  Prehn sign negative.  Cremasteric reflex intact.  Tender to palpation at the midline of the perineum.  Skin: No rashes or wounds seen.      Antibiotics  oxyCODONE-acetaminophen (Percocet) 5-325 mg per tablet 1 tablet  sodium chloride 0.9 % bolus 1,000 mL  vancomycin in dextrose 5 % (Vancocin) IVPB 2,000 mg  piperacillin-tazobactam-dextrose (Zosyn) IV 4.5 g  iohexol (OMNIPaque) 350 mg iodine/mL solution 100 mL  morphine injection 4 mg  piperacillin-tazobactam-dextrose (Zosyn) IV 4.5 g  piperacillin-tazobactam-dextrose (Zosyn) IV 4.5 g  vancomycin in dextrose 5 % (Vancocin) IVPB 1,000 mg  amLODIPine (Norvasc) tablet  aspirin chewable tablet    losartan (Cozaar) tablet  metoprolol succinate (Toprol-XL) 24 hr tablet  rosuvastatin (Crestor) tablet  SITagliptin (Januvia) tablet  cyclobenzaprine (Flexeril) tablet  amLODIPine (Norvasc) tablet 10 mg  aspirin chewable tablet 81 mg  atorvastatin (Lipitor) tablet 80 mg  insulin lispro (HumaLOG) injection 0-10 Units  dextrose 50 % injection 25 g  glucagon (Glucagen) injection 1 mg  dextrose 10 % in water (D10W) infusion  insulin glargine (Lantus) injection 20 Units  insulin glargine (Lantus) injection 40 Units  oxyCODONE (Roxicodone) immediate release tablet 5 mg  enoxaparin (Lovenox) syringe 40 mg  polyethylene glycol (Glycolax, Miralax) packet 17 g  iohexol (OMNIPaque) 350 mg  iodine/mL solution 80 mL      Relevant Results  Labs  Results from last 72 hours   Lab Units 11/09/23 2029   WBC AUTO x10*3/uL 13.0*   HEMOGLOBIN g/dL 15.3   HEMATOCRIT % 47.3   PLATELETS AUTO x10*3/uL 198   NEUTROS PCT AUTO % 67.3   LYMPHS PCT AUTO % 20.8   MONOS PCT AUTO % 9.9   EOS PCT AUTO % 0.9     Results from last 72 hours   Lab Units 11/09/23 2029   SODIUM mmol/L 133*   POTASSIUM mmol/L 4.1   CHLORIDE mmol/L 99   CO2 mmol/L 20*   BUN mg/dL 18   CREATININE mg/dL 1.05   GLUCOSE mg/dL 284*   CALCIUM mg/dL 9.2   ANION GAP mmol/L 18   EGFR mL/min/1.73m*2 85     Results from last 72 hours   Lab Units 11/09/23 2029   ALK PHOS U/L 79   BILIRUBIN TOTAL mg/dL 0.5   PROTEIN TOTAL g/dL 6.9   ALT U/L 13   AST U/L 9   ALBUMIN g/dL 4.1     Estimated Creatinine Clearance: 105.3 mL/min (by C-G formula based on SCr of 1.05 mg/dL).  C-Reactive Protein   Date Value Ref Range Status   11/10/2023 11.41 (H) <1.00 mg/dL Final     CRP   Date Value Ref Range Status   05/16/2020 13.94 (A) mg/dL Final     Comment:     REF VALUE  < 1.00     05/14/2020 22.42 (A) mg/dL Final     Comment:     REF VALUE  < 1.00       Microbiology  No results found for the last 14 days.      Imaging  US scrotum w doppler    Result Date: 11/10/2023  Interpreted By:  Parth Mccollum, STUDY: US SCROTUM WITH DOPPLER;  11/10/2023 9:11 am   INDICATION: Signs/Symptoms:scrotal thickening.   COMPARISON: CT abdomen pelvis 11/09/2023   ACCESSION NUMBER(S): [id number]   ORDERING CLINICIAN: KRISTEN MATTHEWS   TECHNIQUE: Multiple ultrasonographic images of scrotum and tested were obtained.   FINDINGS: RIGHT HEMISCROTUM:   RIGHT TESTICLE: The right testicle measures 4.6 x 2.6 x 3.3 cm. The right testicle demonstrates a homogeneous echotexture and normal contour. Normal vascularity and Doppler waveforms are observed in the right testicle. Intratesticular cyst is noted measuring up to 0.5 x 0.5 x 0.6 cm. Trace volume hydrocele is noted.   RIGHT EPIDIDYMIS: The right  epididymal head measures 1.1 x 0.9 x 1.2 mm and is within normal limits.   LEFT HEMISCROTUM:   LEFT TESTICLE: The left testicle measures 4 x 2.9 x 3 cm. The left testicle demonstrates a homogeneous echotexture and normal contour. Normal vascularity and Doppler waveforms are observed in the left testicle. Trace volume hydrocele is noted. Intratesticular cyst is seen measuring up to 0.4 x 0.3 x 0.2 cm.   LEFT EPIDIDYMIS: The left epididymal head measures 1.2 x 1 x 1.1 mm and is within normal limits.   Mild scrotal skin thickening is noted. No sonographic abnormality was noted within the patient has localized area of pain.       1. Mild scrotal skin thickening and trace volume bilateral scrotal hydroceles. Otherwise, unremarkable sonographic evaluation of the scrotum. 2. No sonographic abnormality is noted within the patient localized area of pain. Recently described fluid collections within the peritoneum and along the corpus spongiosum are better appreciated on CT of the abdomen and pelvis from 11/09/2023.   I personally reviewed the images/study and I agree with the findings as stated above by resident physician, Dr. Parth Mccollum. The study was interpreted at [facility] in [facility].   MACRO: None     Dictation workstation:   [id number]    CT chest w IV contrast    Result Date: 11/10/2023  Interpreted By:  Elsa Berger and Barbat Antonio STUDY: CT CHEST W IV CONTRAST;  11/10/2023 6:47 am   INDICATION: Signs/Symptoms:cavitary lung lesions on CT AP.   COMPARISON: None.   ACCESSION NUMBER(S): [id number]   ORDERING CLINICIAN: MARLEE KIM   TECHNIQUE: Helical data acquisition of the chest was obtained following intravenous administration of 80 mL of Omnipaque 350. Images were reformatted in axial, coronal, and sagittal planes.   FINDINGS: LUNGS AND AIRWAYS: The trachea and central airways are patent. No endobronchial lesion is seen. There are multiple  subcentimeter pulmonary nodules throughout the bilateral lungs. Again, some of these are solid and some are cavitary. Representative examples include a 0.7 cm lower lobe cavitary lesion (series 204, image 145) and a 0.5 cm solid right lower lobe nodule (series 204, image 189).   Mild mosaic attenuation of the lungs.. Bibasilar subsegmental atelectatic changes. There is no evidence of pneumothorax. There is no pleural effusion. There is no focal consolidative opacity.   There is mild emphysematous changes in the upper lungs.   MEDIASTINUM AND GILBERT, LOWER NECK AND AXILLA: The visualized thyroid gland is within normal limits. There are multiple prominent to perihilar lymph nodes seen, which are nonspecific, but are likely felt to be reactive in nature. Esophagus appears within normal limits as seen.   HEART AND VESSELS: The thoracic aorta normal in course and caliber.There is moderate atherosclerosis present, including calcified and noncalcified plaques.Although, the study is not tailored for evaluation of aorta, there is no definite evidence of acute aortic pathology. Main pulmonary artery is normal in caliber. Minimal coronary artery calcifications are seen. Please note, the study is not optimized for evaluation of coronary arteries. The cardiac chambers are not enlarged. There is no pericardial effusion seen.   UPPER ABDOMEN: There is diffuse hypoattenuation of visualized liver, suggestive of steatosis. Correlate with liver function tests.   CHEST WALL AND OSSEOUS STRUCTURES: Chest wall is within normal limits. No acute osseous pathology.There are no suspicious osseous lesions.Moderate multilevel degenerative changes within visualized spine.       1. Redemonstration of numerous subcentimeter pulmonary nodules measuring up to 0.7 cm. Some solid, and some demonstrating central cavitation. Correlate with concern for metastatic disease. Atypical/fungal infection is in the differential diagnosis. 2. Background mild  emphysema in the upper lungs. 3. Minimal coronary artery calcification. 4. Suggestion of diffuse hepatic steatosis.   I personally reviewed the images/study and I agree with the findings as stated by Antonio Masterson MD. This study was interpreted at [place], [place]   Signed by: Elsa Harper 11/10/2023 8:44 AM Dictation workstation:   [id number]    XR chest 1 view    Result Date: 11/10/2023  Interpreted By:  Cain Prado and Barbat Antonio STUDY: XR CHEST 1 VIEW;  11/10/2023 6:44 am   INDICATION: Per EMR: Nodules on CT.   COMPARISON: Chest radiograph dated 05/13/2020.   ACCESSION NUMBER(S): [id number]   ORDERING CLINICIAN: SALMA CHAVES   FINDINGS: AP radiograph of the chest was provided.     CARDIOMEDIASTINAL SILHOUETTE: Cardiomediastinal silhouette is normal in size and configuration.   LUNGS: The previously described innumerable solid and cavitary nodular opacities throughout the bilateral lung fields are better evaluated on same day CT chest. No pulmonary edema. No pleural effusion. No pneumothorax.   ABDOMEN: No remarkable upper abdominal findings.   BONES: No acute osseous changes.       1. The previously described innumerable solid and cavitary nodular opacities throughout the bilateral lung fields are better evaluated on same day CT chest. 2. Otherwise, no evidence of an acute cardiopulmonary process.   I personally reviewed the images/study and I agree with the findings as stated by Antonio Masterson MD. This study was interpreted at [place], [place]   MACRO: None   Signed by: Cain Prado 11/10/2023 8:42 AM Dictation workstation:   [id number]    CT abdomen pelvis w IV contrast    Result Date: 11/10/2023  Interpreted By:  Perry Cassidy and Barbat Antonio STUDY: CT ABDOMEN PELVIS W IV CONTRAST;  11/9/2023 11:13 pm   INDICATION: Signs/Symptoms:UTI symptoms and urethral discharge with history of  "epidydmal, perianal and perirectal abscess. Concerning for abscess.   Per EMR: 5 day history of urinary symptoms with progressively worsening left scrotal pain. Patient was diagnosed with a urethral abscess about 2-3 years ago, which has continued to \"intermittently flare up over the past couple years\" . Physical exam: Yellow purulent discharge actively draining from the urethra. Tenderness to palpation with slight swelling to the left perianal region between the scrotum and rectum, concerning for abscess and/or cellulitis.   COMPARISON: CT abdomen and pelvis with IV contrast dated 10/17/2022 and 11/06/2021.   ACCESSION NUMBER(S): [id number]   ORDERING CLINICIAN: ALICE JESUS   TECHNIQUE: Contiguous axial images of the abdomen and pelvis were obtained after the intravenous administration of 100 mL Omnipaque 350 contrast. Coronal and sagittal reformatted images were reconstructed from the axial data.   FINDINGS: LOWER CHEST: There are 1-2 dozen new subcentimeter pulmonary nodules in all lobes of both visualized lung bases. Some of these are solid and some of these are cavitary. For example, there are cavitary nodules in the left lower lobe (x2) measuring 0.5 cm and 0.6 cm (axial image 5, 34; series 205 and cavitary nodule in the right lower lobe measuring 0.4 cm (axial image 3240, series 205)). The remainder are solid pulmonary nodules as annotated on the images.   ABDOMEN/PELVIS:   ABDOMINAL WALL: No significant abnormality.   LIVER: Mildly nodular contour and hepatic atrophy consistent with cirrhosis. No suspicious lesion. Hypoattenuation adjacent the falciform ligament likely representing focal fatty infiltration. Unchanged subcapsular calcification adjacent to the right hepatic lobe posteriorly.   BILE DUCTS: No significant intrahepatic or extrahepatic dilatation.   GALLBLADDER: No significant abnormality.   PANCREAS: No significant abnormality.   SPLEEN: No significant abnormality. Note is made of an " accessory splenule.   ADRENALS: No significant abnormality.   KIDNEYS, URETERS, BLADDER: There is mild unchanged scarring in the upper pole of the right kidney. No hydroureteronephrosis or nephroureterolithiasis. The urinary bladder wall thickness appears within normal limits for degree of distention.   REPRODUCTIVE ORGANS: The prostate gland is normal in size without associated inflammatory changes. The seminal vesicles are normal in size without inflammatory changes. There is extensive scarring in the perineum and base of penis due to multiple debridements and repeated infections. For example there is a healed sinus tract extending from base of penis to the perineum that was unchanged dating back to at least 11/06/2021 (axial image 134/212). Just superior to this, there is a linear/elongated hypodense fluid collection measuring 4.1 cm x 2.4 cm x 0.6 cm (AP X craniocaudal X transverse) that is in the identical location to the fluid collection seen on 11/06/2021 at the base of the penis along the left aspect of the corpus spongiosum, and is significantly smaller in size from that time (4 cm x 1.7 cm x 2.8 cm); however, it is increased in size from the MRI 04/16/2023 at which time it measured approximately 1.4 cm x 0.5 cm in AP and transverse dimensions, respectively. There is a small right hydrocele similar to 11/06/2021. There is mild scrotal wall thickening and an area of confluent edema/fluid in the posterior wall of the scrotum that could be related to an abscess though no rim enhancing component is identified.   VESSELS: Mild aortic atherosclerosis without AAA.   RETROPERITONEUM/LYMPH NODES: Several prominent to mildly enlarged bilateral external iliac lymph nodes are similar to prior study and likely chronically reactive. For example there is a 1.6 cm right external iliac lymph node and a 1 cm left external iliac lymph node. No acute retroperitoneal abnormality.   BOWEL/MESENTERY/PERITONEUM:  No inflammatory  bowel wall thickening or dilatation. Normal appendix. No ascites, free air, or intraperitoneal fluid collection.   MUSCULOSKELETAL: No acute osseous abnormality. Mild degenerative changes of the visualized thoracolumbar spine.       1. Innumerable new subcentimeter cavitary and solid pulmonary nodules throughout all lobes of both lung bases measuring up to 0.6 cm. No associated consolidation. Differential diagnosis includes fungal infection, either active or remote sequela thereof, and metastatic disease of unknown primary. There is no evidence for a primary malignancy in the abdomen or pelvis. Recommend full CT chest to definitively exclude any possibly of malignancy in the upper lungs. Recommend workup for fungal infection. 2. Elongated hypodense fluid collection in the left aspect of the perineum along the base of penis adjacent to left aspect of corpus spongiosum measuring 4.1 cm x 2.4 cm x 0.6 cm that is increased in size from 04/16/2023 MRI and identical in location to the much larger abscess noted on 11/06/2021. Again this is concerning for periurethral abscess in the appropriate clinical context. 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 3. There is also new thickening and confluent fluid in the posterior right scrotal wall that could relate to abscess or reactive fluid. This would be better evaluated with dedicated scrotal ultrasound. 4. Cirrhotic liver.     I personally reviewed the images/study and I agree with the findings as stated by Antonio Masterson MD. This study was interpreted at [facility], [place], [place]   MACRO: Perry Cassidy discussed the significance and urgency of this critical finding by telephone with  Donny Rai on 11/10/2023 at 4:13 am.  (**-RCF-**) Findings:  See findings.     Signed by: Perry Cassidy 11/10/2023 4:14 AM Dictation workstation:   [id number]       Assessment/Plan   53 y.o. 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 ED workup revealed leucocytosis, UA concerning for infection, CT/AP periurethral abscess and a new scrotal abscess, Pt HDS, will continue broad spectrum Abx, Urology following. No planned interventions at this point. Patient is stable, will continue IV antibiotics.     #Batsheva-Urethral Abscess, measuring 4.1 cm x 2.4 cm x 0.6 cm   #Recurrent UTI  #HX of complex urethral stricture, s/p urethroplasty 4/21 Dr. Quiroz  #Mild scrotal skin thickening, trace bilateral scrotal hydrocele   ::No targetable area of fluctuance   - continue with Vancomycin and Zosyn  - Oxycodone 0.5mg q6h  - UA: Large leuk esterase, ketones, blood, protein, negative nitrates.  - Follow Ucx  - Follow STI testing  - Scrotal U/S 11/10: mild skin thickening and described fluid collections re- findings demonstrated as per CT  - ESR: 46 CRP 11.41   - Trend CBCs  - Urology following: No plan patient interventions at this time     #Innumerable Cavitary lesion in lungs   :pt not coughing, no sputum production, no hypoxia no hx of weight loss, found incidentally  -follow up fungal screen, B-D Glucan,Galactoman, AFB-sputum, histo antigen   -CT Chest 11/10: Redemonstration of numerous subcentimeter pulmonary nodules.  Some demonstrating central cavitation.  Possible concern for metastatic disease.    #Imaging findings concerning for Cirrhosis  -Mildly nodular contour and hepatic atrophy consistent with cirrhosis on CT abdomen  - liver enzymes within normal limits  -Will need to follow-up with PCP and possibly gastroenterology outpatient  -We will continue to monitor    #Type 2DM, Last A1c 11.6 11/10/23  #HLD  #HTN  Poorly controlled DM  [id number]  Pt reports he is on Lantus  80units.   - Continue with 40 units glargine, ISS+achs, will uptitrate lantus dose if needed  - c/w Atorvastatin 80mg  - c/w home amlodipine 10 mg daily     F : PRN  E: PRN  N: regular diet  A: PIV   DVT Prophylaxis: Lovenox Subq 40     Code Status: full code (confirmed on 11/11/23)   NOK:  Primary Emergency Contact: Joann Garner, Home Phone: [phone]    Tej Orozco MD  PGY-1  EPIC CHAT

## 2023-11-10 NOTE — PROGRESS NOTES
"Vancomycin Dosing by Pharmacy- INITIAL    Elliot Earl is a 53 y.o. year old male who Pharmacy has been consulted for vancomycin dosing for cellulitis, skin and soft tissue. Based on the patient's indication and renal status this patient will be dosed based on a goal AUC of 400-600.     Renal function is currently stable.    Visit Vitals  /82   Pulse 76   Temp 36.7 °C (98 °F)   Resp 18        Lab Results   Component Value Date    CREATININE 1.05 11/09/2023    CREATININE 0.9 11/11/2022    CREATININE 0.8 10/21/2022    CREATININE 0.8 10/17/2022    CREATININE 0.7 10/15/2022        Patient weight is No results found for: \"PTWEIGHT\"    No results found for: \"CULTURE\"     No intake/output data recorded.  [unfilled]    Lab Results   Component Value Date    PATIENTTEMP 37.0 11/09/2023          Assessment/Plan     Patient will be given a loading dose of 2000 mg.  Will initiate vancomycin maintenance,  1000 mg every 12 hours.    This dosing regimen is predicted by [employer] to result in the following pharmacokinetic parameters:    Regimen: 1000 mg IV every 12 hours.  Start time: 10:14 on 11/10/2023  Exposure target: AUC24 (range)400-600 mg/L.hr   AUC24,ss: 506 mg/L.hr  Probability of AUC24 > 400: 67 %  Ctrough,ss: 14.7 mg/L  Probability of Ctrough,ss > 20: 34 %  Probability of nephrotoxicity (Lodise NATE 2009): 10 %      Follow-up level will be ordered on 11/11 at 5a unless clinically indicated sooner.  Will continue to monitor renal function daily while on vancomycin and order serum creatinine at least every 48 hours if not already ordered.  Follow for continued vancomycin needs, clinical response, and signs/symptoms of toxicity.       Anatoliy Norwood, PharmD       "

## 2023-11-10 NOTE — PROGRESS NOTES
Emergency Medicine Transition of Care Note.    I received Elliot Earl in signout from Monica Vazquez PA-C.  Please see the previous ED provider note for all HPI, PE and MDM up to the time of signout at 0200. This is in addition to the primary record.    In brief Elliot Earl is an 53 y.o. male presenting for 5 day history of UTI symptoms and progressively worsening left scrotal pain.  Patient work-up significant for purulent urethral discharge concerning for possible STDs, hyperglycemia, urology consultation for recurrent perirectal/perianal/epididymal abscess, and scrotal cellulitis initiated on IV vancomycin and Zosyn.  Patient disposition pending final urology recommendation and likely admission to medicine.    Chief Complaint   Patient presents with    Lower Urinary Tract Symptoms     At the time of signout we were awaiting: Urology recommendation, final CT AP read, and likely admission to medicine    ED Course as of 11/10/23 0406   Thu Nov 09, 2023 2154 IV vancomycin and Zosyn started for broad-spectrum coverage due to concern for recurrent genitourinary abscess. [MV]   2156 Urinalysis with Reflex Microscopic and Culture(!)  UA appears infected with 3+ LE and presence of WBC's and large blood in the urine. He was also noted to have glucosuria with > 500 glucose and 1+ ketones in the urine. Therefore will add on CMP, CBC and VBG to rule out DKA [MV]   2158 Comprehensive metabolic panel(!)  CMP showed elevated glucose at 284 without evidence of DKA on VBG. Lactate is 1.3.  [MV]   2159 CBC revealed mild leukocytosis at 13.0 with neutrophil predominance [MV]   2348 On re-evaluation, patient given IV morphine due to ineffective pain relief with percocet. He was informed about lab and UA results. [MV]   Fri Nov 10, 2023   0040 CT scan of abdomen pelvis showed a urethral abscess at the base of the penis with additional hypodense focus distally suggestive of communication between the 2 foci as  well as thickening of scrotal skin especially on the left concerning for scrotal cellulitis and rim enhancing scrotal fluid collection measuring 3.6 x 1.9 cm suggestive of intra-scrotal abscess. CT imaging results discussed with patient. Urology (Katie Velazco) consulted for I&D whom recommended admission for IV antibiotic therapy considering patient is diabetic with history of recurrent genitourinary abscesses. [MV]      ED Course User Index  [MV] Monica Vazquez PA-C         Diagnoses as of 11/10/23 0406   Urethral abscess   Cellulitis of scrotum   Insulin dependent type 2 diabetes mellitus (CMS/HCC)       Medical Decision Making  Urology recommendation includes no targetable area of fluctuance amenable to I&D, recommendation for admission to medicine for further treatment with IV antibiotics as well as optimization of glucose control, follow urine cultures, follow STI testing, and urology will follow while admitted.  Discussed ED findings and admission with patient.  Patient stated understanding agree with plan.  Discussed patient presentation with admitting team.  Patient mated to medicine in stable condition.  Once admitted, final CT AP read was significant for innumerable new subcentimeter cavitary and solid pulmonary nodules throughout all lobes of both lungs measuring up to 0.6 cm concerning for possible fungal infection or metastatic disease, elongated hypodense fluid collection in the left aspect of the perineum along the base of the penis suggesting increase in size of periurethral abscess, scrotal abscess, and cirrhotic liver.  CT chest ordered and pending.  Medicine team was made aware.    Final diagnoses:   [N34.0] Urethral abscess   [N49.2] Cellulitis of scrotum   [E11.9, Z79.4] Insulin dependent type 2 diabetes mellitus (CMS/HCC)           Procedure  Procedures    Loy Conte MD

## 2023-11-10 NOTE — PROGRESS NOTES
Pharmacy Medication History Review    Elliot Earl is a 53 y.o. male admitted for No Principal Problem: There is no principal problem currently on the Problem List. Please update the Problem List and refresh.. Pharmacy reviewed the patient's [id number] medications and allergies for accuracy.    The list below reflects the updated PTA list. Comments regarding how patient may be taking medications differently can be found in the Admit Orders Activity  Prior to Admission Medications   Prescriptions Last Dose Informant Patient Reported? Taking?   amLODIPine (Norvasc) 10 mg tablet Unknown Self Yes Yes   Sig: Take 1 tablet (10 mg) by mouth once daily.   aspirin 81 mg chewable tablet Unknown Self Yes Yes   Sig: Chew 1 tablet (81 mg) once daily.   cyclobenzaprine (Flexeril) 10 mg tablet Unknown Self Yes Yes    Sig: Take 1 tablet (10 mg) by mouth 3 times a day as needed for muscle spasms.   insulin glargine (Lantus Solostar U-100 Insulin) 100 unit/mL (3 mL) pen Unknown Self Yes Yes   Sig: Inject 90 Units under the skin once daily at bedtime.   losartan (Cozaar) 100 mg tablet Unknown Self Yes Yes   Sig: Take 1 tablet (100 mg) by mouth once daily.   metoprolol succinate XL (Toprol-XL) 25 mg 24 hr tablet Unknown Self Yes Yes   Sig: Take 1 tablet (25 mg) by mouth once daily.   rosuvastatin (Crestor) 40 mg tablet Unknown Self Yes Yes    Sig: Take 1 tablet (40 mg) by mouth once daily.   sitaGLIPtin phosphate (Januvia) 100 mg tablet Unknown Self Yes Yes    Sig: Take 1 tablet (100 mg) by mouth once daily.      Facility-Administered Medications: None        The list below reflects the updated allergy list. Please review each documented allergy for additional clarification and justification.  Allergies  Reviewed by Jovany Treviño CPhT on 11/10/2023   No Known Allergies         Patient accepts M2B at discharge. Pharmacy has been updated to [facility].    Sources used to complete the med history include Medication list  from [facility] medication fill history from [facility], [facility], patient interview.    Below are additional concerns with the patient's PTA list.    Patient states that his work schedule makes getting to his physician difficult. Patient states that he was dropped from his insurance ([employer]) in August 2023. Patient is in between insurances. Patient will need assist with co-pays. New insurance takes effect January 1.         Jovany Treviño [facility]  Transitions of Care Pharmacy Technician  [facility]  Please reach out via [employer] Secure Chat for questions, or if no response call [phone] or [employer] “[facility]”

## 2023-11-10 NOTE — CONSULTS
"Reason For Consult  Periurethral and scrotal abscess    History Of Present Illness  Elliot Earl is a 53 y.o. 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 ED workup revealed leucocytosis, UA concerning for infection, a periurethral abscess with two communicating foci and a new scrotal abscess on imaging for which Urology is consulted.    Patient endorses urinary frequency, burning dysuria and foul-smelling urine. Denies fever, chills, abdominal or flank pain, hematuria, urinary retention, weak flow or hesitancy. He states no concerns for STD's, in monogamous long term relationship.  Of note patient endorses previously disgnosed urethral abscess (2-3 years ago) which has continued to \"intermittently flare-up over the past couple years (next urology appt. 12/11/23).     Past Medical History  He has a past medical history of Acute myocardial infarction, unspecified (CMS/[facility]) (11/11/2020), Complex tear of medial meniscus, current injury, right knee, initial encounter (06/17/2020), Cutaneous abscess of perineum (01/04/2021), Effusion, right knee (09/15/2020), Encounter for other specified aftercare (05/04/2021), Low back pain, unspecified (06/20/2018), Low back pain, unspecified (01/16/2019), Other conditions influencing health status (12/17/2020), Other specified health status, Pain in left knee, Personal history of other diseases of male genital organs (07/09/2020), Radiculopathy, lumbar region (05/03/2019), and Urinary tract infection, site not specified (05/04/2021).    Surgical History  He has a past surgical history that includes Other surgical history (07/09/2020) and Other surgical " "history (07/09/2020).     Social History  He has no history on file for tobacco use, alcohol use, and drug use.    Family History  No family history on file.     Allergies  Patient has no known allergies.    Review of Systems  Review of Systems   Constitutional:  Negative for activity change, appetite change, chills and fever.   Respiratory:  Negative for shortness of breath.    Cardiovascular:  Negative for chest pain and palpitations.   Gastrointestinal:  Negative for constipation, diarrhea, nausea and vomiting.   Genitourinary:  Positive for dysuria, frequency and penile discharge. Negative for decreased urine volume, flank pain, hematuria and urgency.        Mild redness and tenderness to palpation near junction of left scrotum/perineum though no area of fluctuance or evidence of drainable abscess is noted.     Skin:  Negative for color change.   Neurological:  Negative for light-headedness.   Psychiatric/Behavioral:  Negative for agitation and confusion.          Physical Exam  Physical Exam      Last Recorded Vitals  Blood pressure (!) 153/92, pulse 86, temperature 37.6 °C (99.6 °F), resp. rate 18, height 1.702 m (5' 7\"), weight 127 kg (280 lb), SpO2 95 %.    Relevant Results  CT abdomen pelvis w IV contrast    Result Date: 11/10/2023  STUDY: CT ABDOMEN PELVIS W IV CONTRAST;  11/9/2023 11:13 pm   INDICATION: Signs/Symptoms:UTI symptoms and urethral discharge with history of epidydmal, perianal and perirectal abscess. Concerning for abscess.   Per EMR: 5 day history of urinary symptoms with progressively worsening left scrotal pain. Patient was diagnosed with a urethral abscess about 2-3 years ago, which has continued to \"intermittently flare up over the past couple years\" . Physical exam: Yellow purulent discharge actively draining from the urethra. Tenderness to palpation with slight swelling to the left perianal region between the scrotum and rectum, concerning for abscess and/or cellulitis.   COMPARISON: CT " abdomen and pelvis with IV contrast dated 10/17/2022 and 11/06/2021.   ACCESSION NUMBER(S): [id number]   ORDERING CLINICIAN: ALICE JESUS   TECHNIQUE: Contiguous axial images of the abdomen and pelvis were obtained after the intravenous administration of 100 mL Omnipaque 350 contrast. Coronal and sagittal reformatted images were reconstructed from the axial data.   FINDINGS: LOWER CHEST: No acute abnormality.   ABDOMEN/PELVIS:   ABDOMINAL WALL: No significant abnormality.   LIVER: No significant parenchymal perfusion abnormality. Diffuse decreased attenuation of the hepatic parenchyma, consistent with hepatic steatosis. The liver demonstrates a slightly nodular contour.   BILE DUCTS: No significant intrahepatic or extrahepatic dilatation.   GALLBLADDER: No significant abnormality.   PANCREAS: No significant abnormality.   SPLEEN: No significant abnormality. Note is made of an accessory splenule.   ADRENALS: No significant abnormality.   KIDNEYS, URETERS, BLADDER: No significant abnormality.   REPRODUCTIVE ORGANS: No significant abnormality. There is linear hypodense fluid collection at the base of the penis measuring about 4.1 x 0.6 cm (series 201, image 162). There is a similar additional focus more distally measuring about 2.6 x 0.8 cm (series 201, image 164). These findings were not present on prior exam from 10/17/2022, however were similar to the findings seen CT abdomen and pelvis dated 11/06/2021. There is no evidence of internal air. There is a mild component of associated left perineal subcutaneous stranding and edema. Additionally, the scrotal skin appears thickened, particularly on the left up to about 0.7 cm. There is a fluid collection within the scrotum, separate from the testicles, measuring about 3.6 x 1.9 cm. There is a rim enhancing wall surrounding this collection. This finding was not present on prior exam is from 10/17/2022 and 11/06/2021.   VESSELS: No significant abnormality.    RETROPERITONEUM/LYMPH NODES: No enlarged lymph nodes. No acute retroperitoneal abnormality.   BOWEL/MESENTERY/PERITONEUM:  No inflammatory bowel wall thickening or dilatation. Normal appendix. No ascites, free air, or intraperitoneal fluid collection.   MUSCULOSKELETAL: No acute osseous abnormality. Mild degenerative changes of the visualized thoracolumbar spine.       1. Findings consistent with a urethral abscess at the base of the penis. There is an additional hypodense focus more distally, with suggestion of communication between the 2 foci. There are associated inflammatory changes of the left perineum. 2. Thickening of the scrotal skin, particularly on the left. Findings are concerning for component of scrotal cellulitis. 3. Rim enhancing scrotal fluid collection, concerning for a component of intra-scrotal abscess.     I personally reviewed the images/study and I agree with the findings as stated by Antonio Masterson MD. This study was interpreted at [facility], [place], [place]     Dictation workstation:   [id number]       Assessment/Plan     Elliot Earl is a 53 y.o. 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 ED workup revealed leucocytosis, UA concerning for infection, a periurethral abscess with two communicating foci and a new scrotal abscess on imaging for which Urology is consulted.    AF, HTN, no tachycardia  WBC: 13  Hgb: 15.3  Cr: 1.05  UA: >500 glucose, 3+ blood and LE, + ketones, >50 WBC, >20 RBC    -No targetable area of fluctuance amenable to I&D on exam  -Recommend admission to medicine for treatment of infection with IV antibiotics  as well as optimization of glucose control  -Agree with broad spectrum empiric antibiotics  -Follow Ucx  -Follow STI testing  -Urology will follow. Please page with questions or concerns    Katie Velazco MD

## 2023-11-10 NOTE — H&P
MEDICINE ADMISSION NOTE    History of Present Illness  Elliot Earl is a 53 y.o. 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     Pt reports urinating every 2 hours, denies fever, chills, abdominal or flank pain, hematuria, urinary retention, weak flow or hesitancy or urethral discharge, sores or rash. Has only one sexual partner for 20yrs.    Pt has had similar symptoms in the past, reports about 5x since surgery in April, last episode 2years ago     Review of Systems: 12 POINT REVIEW OF SYSTEM UNREMARKABLE EXCEPT  except as STATED ABOVE  ED Course:  ED Triage Vitals   Temp Heart Rate Resp BP   11/09/23 1806 11/09/23 1806 11/09/23 1806 11/09/23 1806   37.6 °C (99.6 °F) 95 18 113/79      SpO2 Temp src Heart Rate Source Patient Position   11/09/23 1806 -- 11/10/23 0017 11/10/23 0017   96 %  Monitor Sitting      BP Location FiO2 (%)     11/10/23 0017 --     Right arm          Current Vitals  Visit Vitals  /82   Pulse 76   Temp 36.7 °C (98 °F)   Resp 18           Labs:   Results from last 72 hours   Lab Units 11/09/23 2029   SODIUM mmol/L 133*   POTASSIUM mmol/L 4.1   CHLORIDE mmol/L 99   CO2 mmol/L 20*   BUN mg/dL 18   CREATININE mg/dL 1.05   GLUCOSE mg/dL 284*   CALCIUM mg/dL 9.2   ANION GAP mmol/L 18   EGFR mL/min/1.73m*2 85      Results from last 72 hours   Lab Units 11/09/23 2029   WBC AUTO x10*3/uL 13.0*   HEMOGLOBIN g/dL 15.3   HEMATOCRIT % 47.3   PLATELETS AUTO x10*3/uL 198   NEUTROS PCT AUTO % 67.3   LYMPHS PCT AUTO % 20.8   MONOS PCT AUTO % 9.9   EOS PCT AUTO % 0.9      Lab Results   Component Value Date    CALCIUM 9.2 11/09/2023    PHOS 3.7 12/10/2020      Lab Results   Component Value Date    CRP 13.94  (A) 05/16/2020          CBC: WBC 13.0 , HGB 15.3,   BMP: , K 4.1, Cl 99, HCO3 20, BUN 18, CR 1.05, Glu 284  LFTS: AST 9 , ALT 13, ALKPHOS 79 , TBILI 0.5 , DBILI     UA:   Results from last 7 days   Lab Units 11/09/23  1857   COLOR U  Yellow   PH U  6.0   SPEC GRAV UR  1.032   PROTEIN U mg/dL 30 (1+)*   BLOOD UR  LARGE (3+)*   NITRITE U  NEGATIVE   WBC UR /HPF >50*       EKG:   Normal sinus Rhythm , no ST or T wave changes    Imaging:  CT abdomen pelvis w IV contrast  .FINDINGS: LOWER CHEST: There are 1-2 dozen new subcentimeter pulmonary nodules in all lobes of both visualized lung bases. Some of these are solid and some of these are cavitary. For example, there are cavitary nodules in the left lower lobe (x2) measuring 0.5 cm and 0.6 cm (axial image 5, 34; series 205 and cavitary nodule in the right lower lobe measuring 0.4 cm (axial image 3240, series 205)). The remainder are solid pulmonary nodules as annotated on the images.       ABDOMEN/PELVIS:   ABDOMINAL WALL: No significant abnormality.   LIVER: Mildly nodular contour and hepatic atrophy consistent with cirrhosis. No suspicious lesion. Hypoattenuation adjacent the falciform ligament likely representing focal fatty infiltration. Unchanged subcapsular calcification adjacent to the right hepatic lobe posteriorly.     BILE DUCTS: No significant intrahepatic or extrahepatic dilatation.     GALLBLADDER: No significant abnormality.   PANCREAS: No significant abnormality.   SPLEEN: No significant abnormality. Note is made of an accessory splenule.   ADRENALS: No significant abnormality.   KIDNEYS, URETERS, BLADDER: There is mild unchanged scarring in the upper pole of the right kidney. No hydroureteronephrosis or nephroureterolithiasis. The urinary bladder wall thickness appears within normal limits for degree of distention.   REPRODUCTIVE ORGANS: The prostate gland is normal in size without associated inflammatory changes. The seminal vesicles are  normal in size without inflammatory changes. There is extensive scarring in the perineum and base of penis due to multiple debridements and repeated infections. For example there is a healed sinus tract extending from base of penis to the perineum that was unchanged dating back to at least 11/06/2021 (axial image 134/212). Just superior to this, there is a linear/elongated hypodense fluid collection measuring 4.1 cm x 2.4 cm x 0.6 cm (AP X craniocaudal X transverse) that is in the identical location to the fluid collection seen on 11/06/2021 at the base of the penis along the left aspect of the corpus spongiosum, and is significantly smaller in size from that time (4 cm x 1.7 cm x 2.8 cm); however, it is increased in size from the MRI 04/16/2023 at which time it measured approximately 1.4 cm x 0.5 cm in AP and transverse dimensions, respectively. There is a small right hydrocele similar to 11/06/2021. There is mild scrotal wall thickening and an area of confluent edema/fluid in the posterior wall of the scrotum that could be related to an abscess though no rim enhancing component is identified.   VESSELS: Mild aortic atherosclerosis without AAA.   RETROPERITONEUM/LYMPH NODES: Several prominent to mildly enlarged bilateral external iliac lymph nodes are similar to prior study and likely chronically reactive. For example there is a 1.6 cm right external iliac lymph node and a 1 cm left external iliac lymph node. No acute retroperitoneal abnormality.   BOWEL/MESENTERY/PERITONEUM:  No inflammatory bowel wall thickening or dilatation. Normal appendix. No ascites, free air, or intraperitoneal fluid collection.   MUSCULOSKELETAL: No acute osseous abnormality. Mild degenerative changes of the visualized thoracolumbar spine.       1. Innumerable new subcentimeter cavitary and solid pulmonary nodules throughout all lobes of both lung bases measuring up to 0.6 cm. No associated consolidation. Differential diagnosis includes  fungal infection, either active or remote sequela thereof, and metastatic disease of unknown primary. There is no evidence for a primary malignancy in the abdomen or pelvis. Recommend full CT chest to definitively exclude any possibly of malignancy in the upper lungs. Recommend workup for fungal infection. 2. Elongated hypodense fluid collection in the left aspect of the perineum along the base of penis adjacent to left aspect of corpus spongiosum measuring 4.1 cm x 2.4 cm x 0.6 cm that is increased in size from 04/16/2023 MRI and identical in location to the much larger abscess noted on 11/06/2021. Again this is concerning for periurethral abscess in the appropriate clinical context. 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 3. There is also new thickening and confluent fluid in the posterior right scrotal wall that could relate to abscess or reactive fluid. This would be better evaluated with dedicated scrotal ultrasound. 4. Cirrhotic liver.     I personally reviewed the images/study and I agree with the findings as stated by Antonio Masterson MD. This study was interpreted at [facility], [place], [place]   MACRO: Perry Cassidy discussed the significance and urgency of this critical finding by telephone with  Donny Rai on 11/10/2023 at 4:13 am.  (**-RCF-**) Findings:  See findings.     Signed by: Perry Cassidy 11/10/2023 4:14 AM Dictation workstation:   [id number]       ED Interventions:  Medications   piperacillin-tazobactam-dextrose (Zosyn) IV 4.5 g (has no administration in time range)   oxyCODONE-acetaminophen (Percocet) 5-325 mg per tablet 1 tablet (1 tablet oral Given 11/9/23 1901)   sodium chloride 0.9 % bolus 1,000 mL (0 mL intravenous Stopped 11/9/23 2130)   vancomycin in dextrose 5 % (Vancocin) IVPB 2,000 mg (0 mg intravenous Stopped 11/10/23 0014)   piperacillin-tazobactam-dextrose (Zosyn) IV 4.5 g (0 g intravenous  Stopped 11/9/23 2125)   iohexol (OMNIPaque) 350 mg iodine/mL solution 100 mL (100 mL intravenous Given 11/9/23 2313)   morphine injection 4 mg (4 mg intravenous Given 11/10/23 0001)   piperacillin-tazobactam-dextrose (Zosyn) IV 4.5 g (0 g intravenous Stopped 11/10/23 0435)     Scheduled medications  piperacillin-tazobactam, 4.5 g, intravenous, q6h  Vanc      Continuous medications     PRN medications  oxycodone       Cardiac Hx:  Last echo: No results found for this or any previous visit.   Last cath: CV NCDR CATHPCI V5 COLLECTION FORM        Past Medical History  Past Medical History:   Diagnosis Date    Acute myocardial infarction, unspecified (CMS/[facility]) 11/11/2020    Acute MI    Complex tear of medial meniscus, current injury, right knee, initial encounter 06/17/2020    Complex tear of medial meniscus of right knee as current injury, initial encounter    Cutaneous abscess of perineum 01/04/2021    Perineal abscess    Effusion, right knee 09/15/2020    Knee effusion, right    Encounter for other specified aftercare 05/04/2021    Visit for wound check    Low back pain, unspecified 06/20/2018    Lumbar pain    Low back pain, unspecified 01/16/2019    Lumbar pain    Other conditions influencing health status 12/17/2020    History of cough    Other specified health status     No pertinent past medical history    Pain in left knee     Left knee pain, unspecified chronicity    Personal history of other diseases of male genital organs 07/09/2020    History of balanitis    Radiculopathy, lumbar region 05/03/2019    Acute lumbar radiculopathy    Urinary tract infection, site not specified 05/04/2021    Acute urinary tract infection       Surgical History  Past Surgical History:   Procedure Laterality Date    OTHER SURGICAL HISTORY  07/09/2020    Knee surgery    OTHER SURGICAL HISTORY  07/09/2020    Abscess incision and drainage       Social History  He drinks 0.25 Vodka/day currently last drink on Sat, smokes cigarrette a  pack/day for 25yrs, no use of IVDU      Physical Exam   Constitutional: No acute distress, resting comfortably in bed, cooperative  HEENT: Normocephalic, atraumatic, PERRLA, EOMI, moist mucous membranes, no pharyngeal erythema  Cardiovascular: RRR, normal S1/S2, no murmurs noted  Pulmonary: Clear to auscultation b/l, no wheezes/crackles/rhonchi, no increased work of breathing, no supplemental oxygen  GI: Soft, non-tender, non-distended, normoactive bowel sounds  : No tenderness to palpation at the scrotum or groin , no area of fluctuance or evidence of drainable abscess is noted  Lower extremities: Warm and well perfused, no lower extremity edema  Neuro: A&O x3, able to move all 4 extremities spontaneously, normal gait  Psych: Appropriate mood and affect     Medications  Home Meds  Prior to Admission medications    Not on File        Assessment/Plan   53 y.o. 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 ED workup revealed leucocytosis, UA concerning for infection, CTAP periurethral abscess and a new scrotal abscess , Pt HDS, will start broad spectrum Abx, request scrotal scan in the am, Urology following    #Scrotal abscess   #Batsheva urethral abscess  #Recurrent UTI  #HX of complex urethral stricture  #scrotal pain  ::No targetable area of fluctuance   Plan:  -C/W Vanc and Zosyn  -Oxycodone 0.5mg q6h  -Follow Ucx  -Follow STI testing  -dedicated scrotal usg in am  -follow up ST testing,ESR,CRP  -Urology following      # Cavitary lesion in lungs   :pt not coughing, no hx of weight loss, found incidentally  Plan  -follow up fungal screen, B-D Glucan,Galactoman, AFB-sputum, histo antigen   -follow up CT chest    #Type 2DM  #HLD  #HTN  Poorly controlled  DM  [id number]  Pt reports he is on Lantus 80units. Per chart 40iu  Plan  Will give 40units, ISS+achs, can uptitrate lantus dose  -c/w Atorvastatin 80mg  -c/w am=ntihypertensives (Amlodipine 10mg)  -can consider starting Lisinopril-hydrochlorothiazide if BP can tolerate    F : PRN  E: PRN  N: regular diet  A: PIV   DVT Prophylaxis: Lovenox Subq 40    Code Status: No Order (confirmed on admission)   NOK:  Primary Emergency Contact: Joann Garner, Home Phone: [phone]       SALMA MEYER MD  Internal Medicine, PGY-2

## 2023-11-11 LAB
ALBUMIN SERPL BCP-MCNC: 3.6 G/DL (ref 3.4–5)
ANION GAP SERPL CALC-SCNC: 13 MMOL/L (ref 10–20)
ASPERGILLUS GALACTOMANNAN EIA,SERUM: 0.05
BASOPHILS # BLD MANUAL: 0.06 X10*3/UL (ref 0–0.1)
BASOPHILS NFR BLD MANUAL: 0.9 %
BUN SERPL-MCNC: 9 MG/DL (ref 6–23)
CALCIUM SERPL-MCNC: 9.2 MG/DL (ref 8.6–10.6)
CHLORIDE SERPL-SCNC: 102 MMOL/L (ref 98–107)
CO2 SERPL-SCNC: 28 MMOL/L (ref 21–32)
CREAT SERPL-MCNC: 0.87 MG/DL (ref 0.5–1.3)
EOSINOPHIL # BLD MANUAL: 0.16 X10*3/UL (ref 0–0.7)
EOSINOPHIL NFR BLD MANUAL: 2.6 %
ERYTHROCYTE [DISTWIDTH] IN BLOOD BY AUTOMATED COUNT: 13.9 % (ref 11.5–14.5)
FUNGITELL BETA-D GLUCAN,SERUM: <31 PG/ML
GFR SERPL CREATININE-BSD FRML MDRD: >90 ML/MIN/1.73M*2
GLUCOSE BLD MANUAL STRIP-MCNC: 214 MG/DL (ref 74–99)
GLUCOSE BLD MANUAL STRIP-MCNC: 310 MG/DL (ref 74–99)
GLUCOSE BLD MANUAL STRIP-MCNC: 312 MG/DL (ref 74–99)
GLUCOSE BLD MANUAL STRIP-MCNC: 367 MG/DL (ref 74–99)
GLUCOSE BLD MANUAL STRIP-MCNC: 376 MG/DL (ref 74–99)
GLUCOSE SERPL-MCNC: 207 MG/DL (ref 74–99)
HCT VFR BLD AUTO: 46.1 % (ref 41–52)
HGB BLD-MCNC: 14.5 G/DL (ref 13.5–17.5)
IMM GRANULOCYTES # BLD AUTO: 0.07 X10*3/UL (ref 0–0.7)
IMM GRANULOCYTES NFR BLD AUTO: 1.1 % (ref 0–0.9)
LYMPHOCYTES # BLD MANUAL: 1.86 X10*3/UL (ref 1.2–4.8)
LYMPHOCYTES NFR BLD MANUAL: 29.6 %
MAGNESIUM SERPL-MCNC: 2.01 MG/DL (ref 1.6–2.4)
MCH RBC QN AUTO: 25.4 PG (ref 26–34)
MCHC RBC AUTO-ENTMCNC: 31.5 G/DL (ref 32–36)
MCV RBC AUTO: 81 FL (ref 80–100)
MONOCYTES # BLD MANUAL: 0.66 X10*3/UL (ref 0.1–1)
MONOCYTES NFR BLD MANUAL: 10.4 %
NEUTS SEG # BLD MANUAL: 3.5 X10*3/UL (ref 1.2–7)
NEUTS SEG NFR BLD MANUAL: 55.6 %
NRBC BLD-RTO: 0 /100 WBCS (ref 0–0)
PHOSPHATE SERPL-MCNC: 4 MG/DL (ref 2.5–4.9)
PLATELET # BLD AUTO: 217 X10*3/UL (ref 150–450)
POTASSIUM SERPL-SCNC: 3.8 MMOL/L (ref 3.5–5.3)
RBC # BLD AUTO: 5.71 X10*6/UL (ref 4.5–5.9)
RBC MORPH BLD: NORMAL
SODIUM SERPL-SCNC: 139 MMOL/L (ref 136–145)
TOTAL CELLS COUNTED BLD: 115
VANCOMYCIN SERPL-MCNC: 7.9 UG/ML (ref 5–20)
VARIANT LYMPHS # BLD MANUAL: 0.06 X10*3/UL (ref 0–0.5)
VARIANT LYMPHS NFR BLD: 0.9 %
WBC # BLD AUTO: 6.3 X10*3/UL (ref 4.4–11.3)

## 2023-11-11 PROCEDURE — 83735 ASSAY OF MAGNESIUM: CPT

## 2023-11-11 PROCEDURE — 80069 RENAL FUNCTION PANEL: CPT

## 2023-11-11 PROCEDURE — 80202 ASSAY OF VANCOMYCIN: CPT

## 2023-11-11 PROCEDURE — 2500000004 HC RX 250 GENERAL PHARMACY W/ HCPCS (ALT 636 FOR OP/ED): Performed by: INTERNAL MEDICINE

## 2023-11-11 PROCEDURE — 36415 COLL VENOUS BLD VENIPUNCTURE: CPT

## 2023-11-11 PROCEDURE — 99223 1ST HOSP IP/OBS HIGH 75: CPT | Performed by: INTERNAL MEDICINE

## 2023-11-11 PROCEDURE — 85027 COMPLETE CBC AUTOMATED: CPT

## 2023-11-11 PROCEDURE — 82947 ASSAY GLUCOSE BLOOD QUANT: CPT

## 2023-11-11 PROCEDURE — 2500000002 HC RX 250 W HCPCS SELF ADMINISTERED DRUGS (ALT 637 FOR MEDICARE OP, ALT 636 FOR OP/ED)

## 2023-11-11 PROCEDURE — 99232 SBSQ HOSP IP/OBS MODERATE 35: CPT

## 2023-11-11 PROCEDURE — 2060000001 HC INTERMEDIATE ICU ROOM DAILY

## 2023-11-11 PROCEDURE — 2500000004 HC RX 250 GENERAL PHARMACY W/ HCPCS (ALT 636 FOR OP/ED)

## 2023-11-11 PROCEDURE — 85007 BL SMEAR W/DIFF WBC COUNT: CPT

## 2023-11-11 PROCEDURE — A4217 STERILE WATER/SALINE, 500 ML: HCPCS | Performed by: INTERNAL MEDICINE

## 2023-11-11 PROCEDURE — 96372 THER/PROPH/DIAG INJ SC/IM: CPT

## 2023-11-11 PROCEDURE — 2500000001 HC RX 250 WO HCPCS SELF ADMINISTERED DRUGS (ALT 637 FOR MEDICARE OP)

## 2023-11-11 RX ORDER — INSULIN GLARGINE 100 [IU]/ML
45 INJECTION, SOLUTION SUBCUTANEOUS NIGHTLY
Status: DISCONTINUED | OUTPATIENT
Start: 2023-11-11 | End: 2023-11-13

## 2023-11-11 RX ADMIN — ASPIRIN 81 MG CHEWABLE TABLET 81 MG: 81 TABLET CHEWABLE at 08:51

## 2023-11-11 RX ADMIN — INSULIN LISPRO 10 UNITS: 100 INJECTION, SOLUTION INTRAVENOUS; SUBCUTANEOUS at 19:02

## 2023-11-11 RX ADMIN — PIPERACILLIN SODIUM AND TAZOBACTAM SODIUM 4.5 G: 4; .5 INJECTION, SOLUTION INTRAVENOUS at 14:12

## 2023-11-11 RX ADMIN — PIPERACILLIN SODIUM AND TAZOBACTAM SODIUM 4.5 G: 4; .5 INJECTION, SOLUTION INTRAVENOUS at 21:24

## 2023-11-11 RX ADMIN — VANCOMYCIN HYDROCHLORIDE 1000 MG: 1 INJECTION, SOLUTION INTRAVENOUS at 09:55

## 2023-11-11 RX ADMIN — VANCOMYCIN HYDROCHLORIDE 1500 MG: 5 INJECTION, POWDER, LYOPHILIZED, FOR SOLUTION INTRAVENOUS at 22:06

## 2023-11-11 RX ADMIN — POLYETHYLENE GLYCOL 3350 17 G: 17 POWDER, FOR SOLUTION ORAL at 08:52

## 2023-11-11 RX ADMIN — PIPERACILLIN SODIUM AND TAZOBACTAM SODIUM 4.5 G: 4; .5 INJECTION, SOLUTION INTRAVENOUS at 03:21

## 2023-11-11 RX ADMIN — ATORVASTATIN CALCIUM 80 MG: 80 TABLET, FILM COATED ORAL at 21:24

## 2023-11-11 RX ADMIN — ENOXAPARIN SODIUM 40 MG: 100 INJECTION SUBCUTANEOUS at 21:24

## 2023-11-11 RX ADMIN — INSULIN GLARGINE 45 UNITS: 100 INJECTION, SOLUTION SUBCUTANEOUS at 21:24

## 2023-11-11 RX ADMIN — INSULIN LISPRO 8 UNITS: 100 INJECTION, SOLUTION INTRAVENOUS; SUBCUTANEOUS at 14:12

## 2023-11-11 RX ADMIN — INSULIN LISPRO 4 UNITS: 100 INJECTION, SOLUTION INTRAVENOUS; SUBCUTANEOUS at 09:55

## 2023-11-11 RX ADMIN — AMLODIPINE BESYLATE 10 MG: 10 TABLET ORAL at 08:51

## 2023-11-11 RX ADMIN — PIPERACILLIN SODIUM AND TAZOBACTAM SODIUM 4.5 G: 4; .5 INJECTION, SOLUTION INTRAVENOUS at 08:52

## 2023-11-11 RX ADMIN — ENOXAPARIN SODIUM 40 MG: 100 INJECTION SUBCUTANEOUS at 08:52

## 2023-11-11 ASSESSMENT — COGNITIVE AND FUNCTIONAL STATUS - GENERAL
DAILY ACTIVITIY SCORE: 24
MOBILITY SCORE: 23
CLIMB 3 TO 5 STEPS WITH RAILING: A LITTLE

## 2023-11-11 ASSESSMENT — PAIN - FUNCTIONAL ASSESSMENT
PAIN_FUNCTIONAL_ASSESSMENT: 0-10
PAIN_FUNCTIONAL_ASSESSMENT: 0-10

## 2023-11-11 ASSESSMENT — PAIN SCALES - GENERAL
PAINLEVEL_OUTOF10: 0 - NO PAIN
PAINLEVEL_OUTOF10: 0 - NO PAIN

## 2023-11-11 ASSESSMENT — ACTIVITIES OF DAILY LIVING (ADL): LACK_OF_TRANSPORTATION: NO

## 2023-11-11 NOTE — CARE PLAN
The patient's goals for the shift include      The clinical goals for the shift include Patient will have pain rating of 5 or less throughout shift    Patient remained free from falls and injury throughout shift. Patient remained alert and oriented throughout shift. Patient had no complaints of pain and is currently resting comfortably with stable vital signs. Patient blood sugar of 449 was communicated to Dr Angel at 2104 via secure chat with no interventions at that time, but to recheck in 4 hours. Patient blood sugar at 0109 of 312 also communicated via secure chat to Dr Angel, with no interventions.

## 2023-11-11 NOTE — PROGRESS NOTES
11/11/23 5280   Discharge Planning   Living Arrangements Spouse/significant other;Children  (lives with wife and son)   Support Systems Spouse/significant other;Family members   Assistance Needed Pt reports being completely independent with ADL's including working + driving   Type of Residence Private residence   Number of Stairs to Enter Residence 0   Number of Stairs Within Residence 0   Do you have animals or pets at home? No   Who is requesting discharge planning? Provider   Home or Post Acute Services None   Patient expects to be discharged to: Home with family   Does the patient need discharge transport arranged? No  (Wife will drive home via private vehicle)   Financial Resource Strain   How hard is it for you to pay for the very basics like food, housing, medical care, and heating? Somewhat  (Pt has not seen a provider since July 2023 due to lack of medical insurance)   Housing Stability   In the last 12 months, was there a time when you were not able to pay the mortgage or rent on time? N   In the last 12 months, how many places have you lived? 1   In the last 12 months, was there a time when you did not have a steady place to sleep or slept in a shelter (including now)? N   Transportation Needs   In the past 12 months, has lack of transportation kept you from medical appointments or from getting medications? no   In the past 12 months, has lack of transportation kept you from meetings, work, or from getting things needed for daily living? No   Patient Choice   Provider Choice list and CMS website (https://medicare.gov/care-compare#search) for post-acute Quality and Resource Measure Data were provided and reviewed with: Other (Comment)  (n/a)   Patient / Family choosing to utilize agency / facility established prior to hospitalization No  (n/a)     Met with pt and introduced myself as care coordinator with Care Transitions Team for discharge planning. Pt stated he feels safe at home. Pt denies any falls.  Pt's address, phone number, and contact information was verified. SW Dayanajohn Otero consulted for lack of insurance. Pt will need good Rx coupons to obtain prescriptions, and resources for affordable access to healthcare outpatient for follow up appts.    Home care: none prior to admission.    DME: working glucometer + insulin supplies    PCP: Beltran Oates MD  Last appt: July 2023 Transport to appts: Pt drives himself. Pt stated he has not been able to schedule an appt with provider due to lack of insurance.  Pharm: [facility] in [place]. Pt stated he has not been able to obtain prescriptions due to lack of insurance.     Discharge Planning: RN TCC notified medical team and assigned SW pt does not have insurance. Pt stated he started a new job in August 2023 and lost [employer] insurance due to being over income. Pt stated he is not able to enroll in his employer's insurance plan until April 2024. Email sent to [facility] to screen pt for Medicaid. Pt encouraged to still participate in Medicaid screening to see if he qualifies for insurance as we are in beginning of a new month. Team aware pt will need a refill on all of his medications. Care coordinator will continue to follow for discharge planning needs.    Kamilah Scruggs RN  Transitional Care Coordinator/TCC  [phone]

## 2023-11-11 NOTE — PROGRESS NOTES
"Vancomycin Dosing by Pharmacy- FOLLOW UP    Elliot Earl is a 53 y.o. year old male who Pharmacy has been consulted for vancomycin dosing for cellulitis, skin and soft tissue. Based on the patient's indication and renal status this patient is being dosed based on a goal AUC of 400-600.     Renal function is currently stable.    Current vancomycin dose: 1000 mg given every 12 hours    Estimated vancomycin AUC on current dose: 292 mg/L.hr     Visit Vitals  BP (!) 149/95   Pulse 78   Temp 36.5 °C (97.7 °F)   Resp 18        Lab Results   Component Value Date    CREATININE 0.87 11/11/2023    CREATININE 1.05 11/09/2023    CREATININE 0.9 11/11/2022    CREATININE 0.8 10/21/2022    CREATININE 0.8 10/17/2022    CREATININE 0.7 10/15/2022        Patient weight is No results found for: \"PTWEIGHT\"    No results found for: \"CULTURE\"     I/O last 3 completed shifts:  In: 250 (2 mL/kg) [IV Piggyback:250]  Out: 2950 (23.2 mL/kg) [Urine:2950 (0.6 mL/kg/hr)]  Weight: 127 kg   [unfilled]    Lab Results   Component Value Date    PATIENTTEMP 37.0 11/09/2023        Assessment/Plan    Below goal AUC. Orders placed for new vancomcyin regimen of 1500 every 12 hours to begin at 2200.     This dosing regimen is predicted by [employer] to result in the following pharmacokinetic parameters:  Regimen: 1500 mg IV every 12 hours.  Start time: 21:55 on 11/11/2023  Exposure target: AUC24 (range)400-600 mg/L.hr   AUC24,ss: 438 mg/L.hr  Probability of AUC24 > 400: 67 %  Ctrough,ss: 6.6 mg/L  Probability of Ctrough,ss > 20: 0 %  Probability of nephrotoxicity (Lodise NATE 2009): 4 %    The next level will be obtained on 11/13 at am labs. May be obtained sooner if clinically indicated.   Will continue to monitor renal function daily while on vancomycin and order serum creatinine at least every 48 hours if not already ordered.  Follow for continued vancomycin needs, clinical response, and signs/symptoms of toxicity.       Emma Santamaria, PharmD "

## 2023-11-11 NOTE — PROGRESS NOTES
Elliot Earl is a 53 y.o. male on day 1 of admission presenting with Urethral abscess.    Subjective     NAEON.     Patient states feeling significantly better from symptom perspective since starting IV abx.     Review of Systems  Pertinent positives as above, all other systems negative.    Objective   Range of Vitals (last 24 hours)  Heart Rate:  [78-89]   Temp:  [36.5 °C (97.7 °F)-36.6 °C (97.9 °F)]   Resp:  [16-18]   BP: (124-149)/(90-95)   SpO2:  [92 %-93 %]   Daily Weight  11/09/23 : 127 kg (280 lb)    Body mass index is 43.84 kg/m².    Physical Exam  General: Patient is laying in bed, in no acute distress.   HEENT: Anicteric sclera, no conjunctival pallor. No oral sores/ulcers. No dental caries.   CVS: S1/S2 audible, no M/G/R.  Resp: Breathing comfortably on RA. Normal vesicular breathing. No wheezing, crackles or rhonchi auscultated.  On 2 L nasal cannula  Abd: Non distended, non tender, no organomegaly was appreciated. +BS.  CNS: AAO x4. No gross focal deficits appreciated.  : No observable or palpable areas of fluctuance in the perineum, urethra, or bilateral testicles.  No purulent drainage from the urethral tip after manipulation.  Stable, clean postsurgical markings in the left inguinal space.  No palpable lymphadenopathy.  Prehn sign negative.  Cremasteric reflex intact.  Tender to palpation at the midline of the perineum.  Skin: No rashes or wounds seen.      Antibiotics  oxyCODONE-acetaminophen (Percocet) 5-325 mg per tablet 1 tablet  sodium chloride 0.9 % bolus 1,000 mL  vancomycin in dextrose 5 % (Vancocin) IVPB 2,000 mg  piperacillin-tazobactam-dextrose (Zosyn) IV 4.5 g  iohexol (OMNIPaque) 350 mg iodine/mL solution 100 mL  morphine injection 4 mg  piperacillin-tazobactam-dextrose (Zosyn) IV 4.5 g  piperacillin-tazobactam-dextrose (Zosyn) IV 4.5 g  vancomycin in dextrose 5 % (Vancocin) IVPB 1,000 mg  amLODIPine (Norvasc) tablet  aspirin chewable tablet    losartan (Cozaar)  tablet  metoprolol succinate (Toprol-XL) 24 hr tablet  rosuvastatin (Crestor) tablet  SITagliptin (Januvia) tablet  cyclobenzaprine (Flexeril) tablet  amLODIPine (Norvasc) tablet 10 mg  aspirin chewable tablet 81 mg  atorvastatin (Lipitor) tablet 80 mg  insulin lispro (HumaLOG) injection 0-10 Units  dextrose 50 % injection 25 g  glucagon (Glucagen) injection 1 mg  dextrose 10 % in water (D10W) infusion  insulin glargine (Lantus) injection 20 Units  insulin glargine (Lantus) injection 40 Units  oxyCODONE (Roxicodone) immediate release tablet 5 mg  enoxaparin (Lovenox) syringe 40 mg  polyethylene glycol (Glycolax, Miralax) packet 17 g  iohexol (OMNIPaque) 350 mg iodine/mL solution 80 mL      Relevant Results  Labs  Results from last 72 hours   Lab Units 11/11/23 0724 11/09/23 2029   WBC AUTO x10*3/uL 6.3 13.0*   HEMOGLOBIN g/dL 14.5 15.3   HEMATOCRIT % 46.1 47.3   PLATELETS AUTO x10*3/uL 217 198   NEUTROS PCT AUTO %  --  67.3   LYMPHO PCT MAN % 29.6  --    LYMPHS PCT AUTO %  --  20.8   MONO PCT MAN % 10.4  --    MONOS PCT AUTO %  --  9.9   EOSINO PCT MAN % 2.6  --    EOS PCT AUTO %  --  0.9       Results from last 72 hours   Lab Units 11/11/23 0724 11/09/23 2029   SODIUM mmol/L 139 133*   POTASSIUM mmol/L 3.8 4.1   CHLORIDE mmol/L 102 99   CO2 mmol/L 28 20*   BUN mg/dL 9 18   CREATININE mg/dL 0.87 1.05   GLUCOSE mg/dL 207* 284*   CALCIUM mg/dL 9.2 9.2   ANION GAP mmol/L 13 18   EGFR mL/min/1.73m*2 >90 85   PHOSPHORUS mg/dL 4.0  --        Results from last 72 hours   Lab Units 11/11/23 0724 11/09/23 2029   ALK PHOS U/L  --  79   BILIRUBIN TOTAL mg/dL  --  0.5   PROTEIN TOTAL g/dL  --  6.9   ALT U/L  --  13   AST U/L  --  9   ALBUMIN g/dL 3.6 4.1       Estimated Creatinine Clearance: 125 mL/min (by C-G formula based on SCr of 0.87 mg/dL).  C-Reactive Protein   Date Value Ref Range Status   11/10/2023 11.41 (H) <1.00 mg/dL Final     CRP   Date Value Ref Range Status   05/16/2020 13.94 (A) mg/dL Final     Comment:      REF VALUE  < 1.00     05/14/2020 22.42 (A) mg/dL Final     Comment:     REF VALUE  < 1.00       Microbiology  No results found for the last 14 days.      Imaging  US scrotum w doppler    Result Date: 11/10/2023  Interpreted By:  Parth Mccollum, STUDY: US SCROTUM WITH DOPPLER;  11/10/2023 9:11 am   INDICATION: Signs/Symptoms:scrotal thickening.   COMPARISON: CT abdomen pelvis 11/09/2023   ACCESSION NUMBER(S): [id number]   ORDERING CLINICIAN: KRISTEN MATTHEWS   TECHNIQUE: Multiple ultrasonographic images of scrotum and tested were obtained.   FINDINGS: RIGHT HEMISCROTUM:   RIGHT TESTICLE: The right testicle measures 4.6 x 2.6 x 3.3 cm. The right testicle demonstrates a homogeneous echotexture and normal contour. Normal vascularity and Doppler waveforms are observed in the right testicle. Intratesticular cyst is noted measuring up to 0.5 x 0.5 x 0.6 cm. Trace volume hydrocele is noted.   RIGHT EPIDIDYMIS: The right epididymal head measures 1.1 x 0.9 x 1.2 mm and is within normal limits.   LEFT HEMISCROTUM:   LEFT TESTICLE: The left testicle measures 4 x 2.9 x 3 cm. The left testicle demonstrates a homogeneous echotexture and normal contour. Normal vascularity and Doppler waveforms are observed in the left testicle. Trace volume hydrocele is noted. Intratesticular cyst is seen measuring up to 0.4 x 0.3 x 0.2 cm.   LEFT EPIDIDYMIS: The left epididymal head measures 1.2 x 1 x 1.1 mm and is within normal limits.   Mild scrotal skin thickening is noted. No sonographic abnormality was noted within the patient has localized area of pain.       1. Mild scrotal skin thickening and trace volume bilateral scrotal hydroceles. Otherwise, unremarkable sonographic evaluation of the scrotum. 2. No sonographic abnormality is noted within the patient localized area of pain. Recently described fluid collections within the peritoneum and along the corpus spongiosum are better appreciated on CT of the abdomen and pelvis from 11/09/2023.    I personally reviewed the images/study and I agree with the findings as stated above by resident physician, Dr. Parth Mccollum. The study was interpreted at [facility] in [facility].   MACRO: None     Dictation workstation:   [id number]    CT chest w IV contrast    Result Date: 11/10/2023  Interpreted By:  Elsa Berger and Barbat Antonio STUDY: CT CHEST W IV CONTRAST;  11/10/2023 6:47 am   INDICATION: Signs/Symptoms:cavitary lung lesions on CT AP.   COMPARISON: None.   ACCESSION NUMBER(S): [id number]   ORDERING CLINICIAN: MARLEE KIM   TECHNIQUE: Helical data acquisition of the chest was obtained following intravenous administration of 80 mL of Omnipaque 350. Images were reformatted in axial, coronal, and sagittal planes.   FINDINGS: LUNGS AND AIRWAYS: The trachea and central airways are patent. No endobronchial lesion is seen. There are multiple subcentimeter pulmonary nodules throughout the bilateral lungs. Again, some of these are solid and some are cavitary. Representative examples include a 0.7 cm lower lobe cavitary lesion (series 204, image 145) and a 0.5 cm solid right lower lobe nodule (series 204, image 189).   Mild mosaic attenuation of the lungs.. Bibasilar subsegmental atelectatic changes. There is no evidence of pneumothorax. There is no pleural effusion. There is no focal consolidative opacity.   There is mild emphysematous changes in the upper lungs.   MEDIASTINUM AND GILBERT, LOWER NECK AND AXILLA: The visualized thyroid gland is within normal limits. There are multiple prominent to perihilar lymph nodes seen, which are nonspecific, but are likely felt to be reactive in nature. Esophagus appears within normal limits as seen.   HEART AND VESSELS: The thoracic aorta normal in course and caliber.There is moderate atherosclerosis present, including calcified and noncalcified plaques.Although, the study is not tailored for evaluation of aorta,  there is no definite evidence of acute aortic pathology. Main pulmonary artery is normal in caliber. Minimal coronary artery calcifications are seen. Please note, the study is not optimized for evaluation of coronary arteries. The cardiac chambers are not enlarged. There is no pericardial effusion seen.   UPPER ABDOMEN: There is diffuse hypoattenuation of visualized liver, suggestive of steatosis. Correlate with liver function tests.   CHEST WALL AND OSSEOUS STRUCTURES: Chest wall is within normal limits. No acute osseous pathology.There are no suspicious osseous lesions.Moderate multilevel degenerative changes within visualized spine.       1. Redemonstration of numerous subcentimeter pulmonary nodules measuring up to 0.7 cm. Some solid, and some demonstrating central cavitation. Correlate with concern for metastatic disease. Atypical/fungal infection is in the differential diagnosis. 2. Background mild emphysema in the upper lungs. 3. Minimal coronary artery calcification. 4. Suggestion of diffuse hepatic steatosis.   I personally reviewed the images/study and I agree with the findings as stated by Antonio Masterson MD. This study was interpreted at [facility], [place], [place]   Signed by: Elsa Harper 11/10/2023 8:44 AM Dictation workstation:   [id number]    XR chest 1 view    Result Date: 11/10/2023  Interpreted By:  Cain Prado and Barbat Antonio STUDY: XR CHEST 1 VIEW;  11/10/2023 6:44 am   INDICATION: Per EMR: Nodules on CT.   COMPARISON: Chest radiograph dated 05/13/2020.   ACCESSION NUMBER(S): [id number]   ORDERING CLINICIAN: SALMA CAR-ADJAYLE   FINDINGS: AP radiograph of the chest was provided.     CARDIOMEDIASTINAL SILHOUETTE: Cardiomediastinal silhouette is normal in size and configuration.   LUNGS: The previously described innumerable solid and cavitary nodular opacities throughout the bilateral lung fields are better evaluated on same day CT chest. No  "pulmonary edema. No pleural effusion. No pneumothorax.   ABDOMEN: No remarkable upper abdominal findings.   BONES: No acute osseous changes.       1. The previously described innumerable solid and cavitary nodular opacities throughout the bilateral lung fields are better evaluated on same day CT chest. 2. Otherwise, no evidence of an acute cardiopulmonary process.   I personally reviewed the images/study and I agree with the findings as stated by Antonio Masterson MD. This study was interpreted at [facility], [place], [place]   MACRO: None   Signed by: Cain Prado 11/10/2023 8:42 AM Dictation workstation:   [id number]    CT abdomen pelvis w IV contrast    Result Date: 11/10/2023  Interpreted By:  Perry Cassidy  and Zelalem Alvarez STUDY: CT ABDOMEN PELVIS W IV CONTRAST;  11/9/2023 11:13 pm   INDICATION: Signs/Symptoms:UTI symptoms and urethral discharge with history of epidydmal, perianal and perirectal abscess. Concerning for abscess.   Per EMR: 5 day history of urinary symptoms with progressively worsening left scrotal pain. Patient was diagnosed with a urethral abscess about 2-3 years ago, which has continued to \"intermittently flare up over the past couple years\" . Physical exam: Yellow purulent discharge actively draining from the urethra. Tenderness to palpation with slight swelling to the left perianal region between the scrotum and rectum, concerning for abscess and/or cellulitis.   COMPARISON: CT abdomen and pelvis with IV contrast dated 10/17/2022 and 11/06/2021.   ACCESSION NUMBER(S): [id number]   ORDERING CLINICIAN: ALICE JESUS   TECHNIQUE: Contiguous axial images of the abdomen and pelvis were obtained after the intravenous administration of 100 mL Omnipaque 350 contrast. Coronal and sagittal reformatted images were reconstructed from the axial data.   FINDINGS: LOWER CHEST: There are 1-2 dozen new subcentimeter pulmonary nodules in all lobes of both visualized " lung bases. Some of these are solid and some of these are cavitary. For example, there are cavitary nodules in the left lower lobe (x2) measuring 0.5 cm and 0.6 cm (axial image 5, 34; series 205 and cavitary nodule in the right lower lobe measuring 0.4 cm (axial image 3240, series 205)). The remainder are solid pulmonary nodules as annotated on the images.   ABDOMEN/PELVIS:   ABDOMINAL WALL: No significant abnormality.   LIVER: Mildly nodular contour and hepatic atrophy consistent with cirrhosis. No suspicious lesion. Hypoattenuation adjacent the falciform ligament likely representing focal fatty infiltration. Unchanged subcapsular calcification adjacent to the right hepatic lobe posteriorly.   BILE DUCTS: No significant intrahepatic or extrahepatic dilatation.   GALLBLADDER: No significant abnormality.   PANCREAS: No significant abnormality.   SPLEEN: No significant abnormality. Note is made of an accessory splenule.   ADRENALS: No significant abnormality.   KIDNEYS, URETERS, BLADDER: There is mild unchanged scarring in the upper pole of the right kidney. No hydroureteronephrosis or nephroureterolithiasis. The urinary bladder wall thickness appears within normal limits for degree of distention.   REPRODUCTIVE ORGANS: The prostate gland is normal in size without associated inflammatory changes. The seminal vesicles are normal in size without inflammatory changes. There is extensive scarring in the perineum and base of penis due to multiple debridements and repeated infections. For example there is a healed sinus tract extending from base of penis to the perineum that was unchanged dating back to at least 11/06/2021 (axial image 134/212). Just superior to this, there is a linear/elongated hypodense fluid collection measuring 4.1 cm x 2.4 cm x 0.6 cm (AP X craniocaudal X transverse) that is in the identical location to the fluid collection seen on 11/06/2021 at the base of the penis along the left aspect of the corpus  spongiosum, and is significantly smaller in size from that time (4 cm x 1.7 cm x 2.8 cm); however, it is increased in size from the MRI 04/16/2023 at which time it measured approximately 1.4 cm x 0.5 cm in AP and transverse dimensions, respectively. There is a small right hydrocele similar to 11/06/2021. There is mild scrotal wall thickening and an area of confluent edema/fluid in the posterior wall of the scrotum that could be related to an abscess though no rim enhancing component is identified.   VESSELS: Mild aortic atherosclerosis without AAA.   RETROPERITONEUM/LYMPH NODES: Several prominent to mildly enlarged bilateral external iliac lymph nodes are similar to prior study and likely chronically reactive. For example there is a 1.6 cm right external iliac lymph node and a 1 cm left external iliac lymph node. No acute retroperitoneal abnormality.   BOWEL/MESENTERY/PERITONEUM:  No inflammatory bowel wall thickening or dilatation. Normal appendix. No ascites, free air, or intraperitoneal fluid collection.   MUSCULOSKELETAL: No acute osseous abnormality. Mild degenerative changes of the visualized thoracolumbar spine.       1. Innumerable new subcentimeter cavitary and solid pulmonary nodules throughout all lobes of both lung bases measuring up to 0.6 cm. No associated consolidation. Differential diagnosis includes fungal infection, either active or remote sequela thereof, and metastatic disease of unknown primary. There is no evidence for a primary malignancy in the abdomen or pelvis. Recommend full CT chest to definitively exclude any possibly of malignancy in the upper lungs. Recommend workup for fungal infection. 2. Elongated hypodense fluid collection in the left aspect of the perineum along the base of penis adjacent to left aspect of corpus spongiosum measuring 4.1 cm x 2.4 cm x 0.6 cm that is increased in size from 04/16/2023 MRI and identical in location to the much larger abscess noted on 11/06/2021.  Again this is concerning for periurethral abscess in the appropriate clinical context. 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 3. There is also new thickening and confluent fluid in the posterior right scrotal wall that could relate to abscess or reactive fluid. This would be better evaluated with dedicated scrotal ultrasound. 4. Cirrhotic liver.     I personally reviewed the images/study and I agree with the findings as stated by Antonio Masterson MD. This study was interpreted at [facility], [place], [place]   MACRO: Perry Cassidy discussed the significance and urgency of this critical finding by telephone with  Donny Rai on 11/10/2023 at 4:13 am.  (**-RCF-**) Findings:  See findings.     Signed by: Perry Cassidy 11/10/2023 4:14 AM Dictation workstation:   [id number]       Assessment/Plan   53 y.o. 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 ED workup revealed leucocytosis, UA concerning for infection, CT/AP periurethral abscess and a new scrotal abscess, Pt HDS, will continue broad spectrum Abx, Urology following. No planned interventions at this point. Patient is stable, will continue IV antibiotics. Improving. Likely to discharge home once able to clarify satisfactory regimen with ID.     #Batsheva-Urethral Abscess, measuring 4.1 cm x 2.4 cm x 0.6 cm   #Recurrent UTI  #HX of complex urethral stricture, s/p urethroplasty 4/21 Dr. Quiroz  #Mild scrotal skin thickening, trace bilateral scrotal hydrocele   ::No targetable area of fluctuance   - continue with Vancomycin and Zosyn  - Oxycodone 0.5mg q6h  - UA: Large leuk esterase, ketones, blood, protein, negative nitrates.  -  Follow Ucx. NGTD at this time   - STI testing negative   - Scrotal U/S 11/10: mild skin thickening and described fluid collections re- findings demonstrated as per CT  - ESR: 46 CRP 11.41   - Trend CBCs  - Urology following: No plan patient interventions at this time  -ID consulted, appreciate recs      #Innumerable Cavitary lesion in lungs   :pt not coughing, no sputum production, no hypoxia no hx of weight loss, found incidentally  -follow up fungal screen, B-D Glucan,Galactoman, AFB-sputum, histo antigen   -CT Chest 11/10: Redemonstration of numerous subcentimeter pulmonary nodules.  Some demonstrating central cavitation.  Possible concern for metastatic disease.    #Imaging findings concerning for Cirrhosis  -Mildly nodular contour and hepatic atrophy consistent with cirrhosis on CT abdomen  - liver enzymes within normal limits  -Will need to follow-up with PCP and possibly gastroenterology outpatient  -We will continue to monitor    #Type 2DM, Last A1c 11.6 11/10/23  #HLD  #HTN  Poorly controlled DM  [id number]  Pt reports he is on Lantus 80units.   - Increase glargine from 40 to 45U.  ISS+achs, will uptitrate lantus dose if needed  - c/w Atorvastatin 80mg  - c/w home amlodipine 10 mg daily     F : PRN  E: PRN  N: regular diet  A: PIV   DVT Prophylaxis: Lovenox Subq 40     Code Status: full code (confirmed on 11/11/23)   NOK:  Primary Emergency Contact: Joann Garner, Home Phone: [phone]    Jayme Ozuna MD

## 2023-11-12 LAB
ALBUMIN SERPL BCP-MCNC: 4.1 G/DL (ref 3.4–5)
ALP SERPL-CCNC: 64 U/L (ref 33–120)
ALT SERPL W P-5'-P-CCNC: 13 U/L (ref 10–52)
ANION GAP SERPL CALC-SCNC: 15 MMOL/L (ref 10–20)
APPEARANCE UR: CLEAR
AST SERPL W P-5'-P-CCNC: 10 U/L (ref 9–39)
BASOPHILS # BLD MANUAL: 0 X10*3/UL (ref 0–0.1)
BASOPHILS NFR BLD MANUAL: 0 %
BILIRUB SERPL-MCNC: 0.5 MG/DL (ref 0–1.2)
BILIRUB UR STRIP.AUTO-MCNC: NEGATIVE MG/DL
BUN SERPL-MCNC: 9 MG/DL (ref 6–23)
CALCIUM SERPL-MCNC: 9.3 MG/DL (ref 8.6–10.6)
CHLORIDE SERPL-SCNC: 100 MMOL/L (ref 98–107)
CO2 SERPL-SCNC: 27 MMOL/L (ref 21–32)
COLOR UR: YELLOW
CREAT SERPL-MCNC: 0.84 MG/DL (ref 0.5–1.3)
EOSINOPHIL # BLD MANUAL: 0.29 X10*3/UL (ref 0–0.7)
EOSINOPHIL NFR BLD MANUAL: 4.3 %
ERYTHROCYTE [DISTWIDTH] IN BLOOD BY AUTOMATED COUNT: 13.6 % (ref 11.5–14.5)
GFR SERPL CREATININE-BSD FRML MDRD: >90 ML/MIN/1.73M*2
GLUCOSE BLD MANUAL STRIP-MCNC: 182 MG/DL (ref 74–99)
GLUCOSE BLD MANUAL STRIP-MCNC: 223 MG/DL (ref 74–99)
GLUCOSE BLD MANUAL STRIP-MCNC: 274 MG/DL (ref 74–99)
GLUCOSE BLD MANUAL STRIP-MCNC: 305 MG/DL (ref 74–99)
GLUCOSE SERPL-MCNC: 153 MG/DL (ref 74–99)
GLUCOSE UR STRIP.AUTO-MCNC: ABNORMAL MG/DL
HCT VFR BLD AUTO: 47.7 % (ref 41–52)
HGB BLD-MCNC: 15.2 G/DL (ref 13.5–17.5)
HIV1 RNA # PLAS NAA DL=20: NOT DETECTED {COPIES}/ML
HIV1 RNA SPEC NAA+PROBE-LOG#: NORMAL {LOG_COPIES}/ML
HOLD SPECIMEN: NORMAL
IMM GRANULOCYTES # BLD AUTO: 0.08 X10*3/UL (ref 0–0.7)
IMM GRANULOCYTES NFR BLD AUTO: 1.2 % (ref 0–0.9)
KETONES UR STRIP.AUTO-MCNC: NEGATIVE MG/DL
LEUKOCYTE ESTERASE UR QL STRIP.AUTO: NEGATIVE
LYMPHOCYTES # BLD MANUAL: 1.75 X10*3/UL (ref 1.2–4.8)
LYMPHOCYTES NFR BLD MANUAL: 26.1 %
MAGNESIUM SERPL-MCNC: 2.06 MG/DL (ref 1.6–2.4)
MCH RBC QN AUTO: 25.3 PG (ref 26–34)
MCHC RBC AUTO-ENTMCNC: 31.9 G/DL (ref 32–36)
MCV RBC AUTO: 79 FL (ref 80–100)
MONOCYTES # BLD MANUAL: 0.52 X10*3/UL (ref 0.1–1)
MONOCYTES NFR BLD MANUAL: 7.8 %
NEUTROPHILS # BLD MANUAL: 3.67 X10*3/UL (ref 1.2–7.7)
NEUTS BAND # BLD MANUAL: 0.06 X10*3/UL (ref 0–0.7)
NEUTS BAND NFR BLD MANUAL: 0.9 %
NEUTS SEG # BLD MANUAL: 3.61 X10*3/UL (ref 1.2–7)
NEUTS SEG NFR BLD MANUAL: 53.9 %
NITRITE UR QL STRIP.AUTO: NEGATIVE
NRBC BLD-RTO: 0 /100 WBCS (ref 0–0)
PH UR STRIP.AUTO: 6 [PH]
PLASMA CELLS # BLD MANUAL: 0.06 X10*3/UL
PLASMA CELLS NFR BLD MANUAL: 0.9 %
PLATELET # BLD AUTO: 246 X10*3/UL (ref 150–450)
POTASSIUM SERPL-SCNC: 3.8 MMOL/L (ref 3.5–5.3)
PROT SERPL-MCNC: 7.1 G/DL (ref 6.4–8.2)
PROT UR STRIP.AUTO-MCNC: ABNORMAL MG/DL
RBC # BLD AUTO: 6.01 X10*6/UL (ref 4.5–5.9)
RBC # UR STRIP.AUTO: NEGATIVE /UL
RBC #/AREA URNS AUTO: NORMAL /HPF
RBC MORPH BLD: NORMAL
SODIUM SERPL-SCNC: 138 MMOL/L (ref 136–145)
SP GR UR STRIP.AUTO: 1.03
SQUAMOUS #/AREA URNS AUTO: NORMAL /HPF
TOTAL CELLS COUNTED BLD: 115
UROBILINOGEN UR STRIP.AUTO-MCNC: <2 MG/DL
VARIANT LYMPHS # BLD MANUAL: 0.41 X10*3/UL (ref 0–0.5)
VARIANT LYMPHS NFR BLD: 6.1 %
WBC # BLD AUTO: 6.7 X10*3/UL (ref 4.4–11.3)
WBC #/AREA URNS AUTO: NORMAL /HPF

## 2023-11-12 PROCEDURE — 36415 COLL VENOUS BLD VENIPUNCTURE: CPT

## 2023-11-12 PROCEDURE — 81001 URINALYSIS AUTO W/SCOPE: CPT

## 2023-11-12 PROCEDURE — 87385 HISTOPLASMA CAPSUL AG IA: CPT

## 2023-11-12 PROCEDURE — 82947 ASSAY GLUCOSE BLOOD QUANT: CPT

## 2023-11-12 PROCEDURE — 86481 TB AG RESPONSE T-CELL SUSP: CPT

## 2023-11-12 PROCEDURE — 99232 SBSQ HOSP IP/OBS MODERATE 35: CPT

## 2023-11-12 PROCEDURE — 2060000001 HC INTERMEDIATE ICU ROOM DAILY

## 2023-11-12 PROCEDURE — A4217 STERILE WATER/SALINE, 500 ML: HCPCS | Performed by: INTERNAL MEDICINE

## 2023-11-12 PROCEDURE — 2500000004 HC RX 250 GENERAL PHARMACY W/ HCPCS (ALT 636 FOR OP/ED): Performed by: INTERNAL MEDICINE

## 2023-11-12 PROCEDURE — 2500000002 HC RX 250 W HCPCS SELF ADMINISTERED DRUGS (ALT 637 FOR MEDICARE OP, ALT 636 FOR OP/ED)

## 2023-11-12 PROCEDURE — 80053 COMPREHEN METABOLIC PANEL: CPT

## 2023-11-12 PROCEDURE — 2500000004 HC RX 250 GENERAL PHARMACY W/ HCPCS (ALT 636 FOR OP/ED)

## 2023-11-12 PROCEDURE — 83735 ASSAY OF MAGNESIUM: CPT

## 2023-11-12 PROCEDURE — 87116 MYCOBACTERIA CULTURE: CPT

## 2023-11-12 PROCEDURE — 85027 COMPLETE CBC AUTOMATED: CPT

## 2023-11-12 PROCEDURE — 86403 PARTICLE AGGLUT ANTBDY SCRN: CPT

## 2023-11-12 PROCEDURE — 86698 HISTOPLASMA ANTIBODY: CPT

## 2023-11-12 PROCEDURE — 2500000001 HC RX 250 WO HCPCS SELF ADMINISTERED DRUGS (ALT 637 FOR MEDICARE OP)

## 2023-11-12 PROCEDURE — 96372 THER/PROPH/DIAG INJ SC/IM: CPT

## 2023-11-12 PROCEDURE — 85007 BL SMEAR W/DIFF WBC COUNT: CPT

## 2023-11-12 RX ORDER — INSULIN LISPRO 100 [IU]/ML
0-5 INJECTION, SOLUTION INTRAVENOUS; SUBCUTANEOUS
Status: DISCONTINUED | OUTPATIENT
Start: 2023-11-12 | End: 2023-11-13 | Stop reason: HOSPADM

## 2023-11-12 RX ORDER — INSULIN LISPRO 100 [IU]/ML
4 INJECTION, SOLUTION INTRAVENOUS; SUBCUTANEOUS
Status: DISCONTINUED | OUTPATIENT
Start: 2023-11-12 | End: 2023-11-13

## 2023-11-12 RX ADMIN — ENOXAPARIN SODIUM 40 MG: 100 INJECTION SUBCUTANEOUS at 20:00

## 2023-11-12 RX ADMIN — ATORVASTATIN CALCIUM 80 MG: 80 TABLET, FILM COATED ORAL at 20:00

## 2023-11-12 RX ADMIN — VANCOMYCIN HYDROCHLORIDE 1500 MG: 5 INJECTION, POWDER, LYOPHILIZED, FOR SOLUTION INTRAVENOUS at 23:21

## 2023-11-12 RX ADMIN — INSULIN GLARGINE 45 UNITS: 100 INJECTION, SOLUTION SUBCUTANEOUS at 20:00

## 2023-11-12 RX ADMIN — INSULIN LISPRO 2 UNITS: 100 INJECTION, SOLUTION INTRAVENOUS; SUBCUTANEOUS at 19:01

## 2023-11-12 RX ADMIN — AMLODIPINE BESYLATE 10 MG: 10 TABLET ORAL at 09:07

## 2023-11-12 RX ADMIN — PIPERACILLIN SODIUM AND TAZOBACTAM SODIUM 4.5 G: 4; .5 INJECTION, SOLUTION INTRAVENOUS at 18:19

## 2023-11-12 RX ADMIN — VANCOMYCIN HYDROCHLORIDE 1500 MG: 5 INJECTION, POWDER, LYOPHILIZED, FOR SOLUTION INTRAVENOUS at 10:53

## 2023-11-12 RX ADMIN — PIPERACILLIN SODIUM AND TAZOBACTAM SODIUM 4.5 G: 4; .5 INJECTION, SOLUTION INTRAVENOUS at 09:07

## 2023-11-12 RX ADMIN — INSULIN LISPRO 4 UNITS: 100 INJECTION, SOLUTION INTRAVENOUS; SUBCUTANEOUS at 19:01

## 2023-11-12 RX ADMIN — ASPIRIN 81 MG CHEWABLE TABLET 81 MG: 81 TABLET CHEWABLE at 09:07

## 2023-11-12 RX ADMIN — INSULIN LISPRO 2 UNITS: 100 INJECTION, SOLUTION INTRAVENOUS; SUBCUTANEOUS at 09:21

## 2023-11-12 RX ADMIN — ENOXAPARIN SODIUM 40 MG: 100 INJECTION SUBCUTANEOUS at 09:07

## 2023-11-12 RX ADMIN — PIPERACILLIN SODIUM AND TAZOBACTAM SODIUM 4.5 G: 4; .5 INJECTION, SOLUTION INTRAVENOUS at 02:19

## 2023-11-12 ASSESSMENT — COGNITIVE AND FUNCTIONAL STATUS - GENERAL
DAILY ACTIVITIY SCORE: 24
MOBILITY SCORE: 24

## 2023-11-12 ASSESSMENT — PAIN - FUNCTIONAL ASSESSMENT
PAIN_FUNCTIONAL_ASSESSMENT: 0-10
PAIN_FUNCTIONAL_ASSESSMENT: 0-10

## 2023-11-12 ASSESSMENT — PAIN SCALES - GENERAL
PAINLEVEL_OUTOF10: 0 - NO PAIN
PAINLEVEL_OUTOF10: 0 - NO PAIN

## 2023-11-12 NOTE — PROGRESS NOTES
This SW spoke with Mr. Earl regarding needing assistance with refill on meds.  Patient reported he lost coverage through [facility] when  he became employed.  He stated he's unable to enroll in his new employers plan until April,2024.      Mr. Earl stated his work schedule is a barrier to him scheduling follow-up appts.  SW informed patient community clinics like [facility], and [facility] use an income based sliding scale model for patients with no insurance.  SW provided [employer] card.  SW will continue to follow, and assist as needed.    Dayana GOMEZA LSW

## 2023-11-12 NOTE — PROGRESS NOTES
Elliot Earl is a 53 y.o. male on day 2 of admission presenting with Urethral abscess.    Subjective     NAEON.  Patient feels some symptomatically better today.  No new pulmonary symptoms.  No perineal  or scrotal pain.  No urethral discharge.  Patient's having regular bowel movements and urinating.        Review of Systems  Pertinent positives as above, all other systems negative.    Objective   Range of Vitals (last 24 hours)  Heart Rate:  [81-96]   Temp:  [36 °C (96.8 °F)-36.5 °C (97.7 °F)]   Resp:  [16-18]   BP: (150-169)/(101-110)   SpO2:  [94 %-97 %]   Daily Weight  11/09/23 : 127 kg (280 lb)    Body mass index is 43.84 kg/m².    Physical Exam  General: Patient is laying in bed, in no acute distress.   HEENT: Anicteric sclera, no conjunctival pallor. No oral sores/ulcers. No dental caries.   CVS: S1/S2 audible, no M/G/R.  Resp: Breathing comfortably on RA. Normal vesicular breathing. No wheezing, crackles or rhonchi auscultated.  On 2 L nasal cannula  Abd: Non distended, non tender, no organomegaly was appreciated. +BS.  CNS: AAO x4. No gross focal deficits appreciated.  : No observable or palpable areas of fluctuance in the perineum, urethra, or bilateral testicles.  No purulent drainage from the urethral tip after manipulation.  Stable, clean postsurgical markings in the left inguinal space.  No palpable lymphadenopathy.  Prehn sign negative.  Cremasteric reflex intact.  Tender to palpation at the midline of the perineum.  Skin: No rashes or wounds seen.      Antibiotics  oxyCODONE-acetaminophen (Percocet) 5-325 mg per tablet 1 tablet  sodium chloride 0.9 % bolus 1,000 mL  vancomycin in dextrose 5 % (Vancocin) IVPB 2,000 mg  piperacillin-tazobactam-dextrose (Zosyn) IV 4.5 g  iohexol (OMNIPaque) 350 mg iodine/mL solution 100 mL  morphine injection 4 mg  piperacillin-tazobactam-dextrose (Zosyn) IV 4.5 g  piperacillin-tazobactam-dextrose (Zosyn) IV 4.5 g  vancomycin in dextrose 5 % (Vancocin) IVPB  1,000 mg  amLODIPine (Norvasc) tablet  aspirin chewable tablet    losartan (Cozaar) tablet  metoprolol succinate (Toprol-XL) 24 hr tablet  rosuvastatin (Crestor) tablet  SITagliptin (Januvia) tablet  cyclobenzaprine (Flexeril) tablet  amLODIPine (Norvasc) tablet 10 mg  aspirin chewable tablet 81 mg  atorvastatin (Lipitor) tablet 80 mg  insulin lispro (HumaLOG) injection 0-10 Units  dextrose 50 % injection 25 g  glucagon (Glucagen) injection 1 mg  dextrose 10 % in water (D10W) infusion  insulin glargine (Lantus) injection 20 Units  insulin glargine (Lantus) injection 40 Units  oxyCODONE (Roxicodone) immediate release tablet 5 mg  enoxaparin (Lovenox) syringe 40 mg  polyethylene glycol (Glycolax, Miralax) packet 17 g  iohexol (OMNIPaque) 350 mg iodine/mL solution 80 mL      Relevant Results  Labs  Results from last 72 hours   Lab Units 11/12/23 0923 11/11/23 0724 11/09/23 2029   WBC AUTO x10*3/uL 6.7 6.3 13.0*   HEMOGLOBIN g/dL 15.2 14.5 15.3   HEMATOCRIT % 47.7 46.1 47.3   PLATELETS AUTO x10*3/uL 246 217 198   NEUTROS PCT AUTO %  --   --  67.3   LYMPHO PCT MAN %  --  29.6  --    LYMPHS PCT AUTO %  --   --  20.8   MONO PCT MAN %  --  10.4  --    MONOS PCT AUTO %  --   --  9.9   EOSINO PCT MAN %  --  2.6  --    EOS PCT AUTO %  --   --  0.9       Results from last 72 hours   Lab Units 11/11/23 0724 11/09/23 2029   SODIUM mmol/L 139 133*   POTASSIUM mmol/L 3.8 4.1   CHLORIDE mmol/L 102 99   CO2 mmol/L 28 20*   BUN mg/dL 9 18   CREATININE mg/dL 0.87 1.05   GLUCOSE mg/dL 207* 284*   CALCIUM mg/dL 9.2 9.2   ANION GAP mmol/L 13 18   EGFR mL/min/1.73m*2 >90 85   PHOSPHORUS mg/dL 4.0  --        Results from last 72 hours   Lab Units 11/11/23 0724 11/09/23 2029   ALK PHOS U/L  --  79   BILIRUBIN TOTAL mg/dL  --  0.5   PROTEIN TOTAL g/dL  --  6.9   ALT U/L  --  13   AST U/L  --  9   ALBUMIN g/dL 3.6 4.1       Estimated Creatinine Clearance: 125 mL/min (by C-G formula based on SCr of 0.87 mg/dL).  C-Reactive Protein   Date  Value Ref Range Status   11/10/2023 11.41 (H) <1.00 mg/dL Final     CRP   Date Value Ref Range Status   05/16/2020 13.94 (A) mg/dL Final     Comment:     REF VALUE  < 1.00     05/14/2020 22.42 (A) mg/dL Final     Comment:     REF VALUE  < 1.00       Microbiology  No results found for the last 14 days.      Imaging  US scrotum w doppler    Result Date: 11/10/2023  Interpreted By:  Parth Mccollum, STUDY: US SCROTUM WITH DOPPLER;  11/10/2023 9:11 am   INDICATION: Signs/Symptoms:scrotal thickening.   COMPARISON: CT abdomen pelvis 11/09/2023   ACCESSION NUMBER(S): [id number]   ORDERING CLINICIAN: KRISTEN MATTHEWS   TECHNIQUE: Multiple ultrasonographic images of scrotum and tested were obtained.   FINDINGS: RIGHT HEMISCROTUM:   RIGHT TESTICLE: The right testicle measures 4.6 x 2.6 x 3.3 cm. The right testicle demonstrates a homogeneous echotexture and normal contour. Normal vascularity and Doppler waveforms are observed in the right testicle. Intratesticular cyst is noted measuring up to 0.5 x 0.5 x 0.6 cm. Trace volume hydrocele is noted.   RIGHT EPIDIDYMIS: The right epididymal head measures 1.1 x 0.9 x 1.2 mm and is within normal limits.   LEFT HEMISCROTUM:   LEFT TESTICLE: The left testicle measures 4 x 2.9 x 3 cm. The left testicle demonstrates a homogeneous echotexture and normal contour. Normal vascularity and Doppler waveforms are observed in the left testicle. Trace volume hydrocele is noted. Intratesticular cyst is seen measuring up to 0.4 x 0.3 x 0.2 cm.   LEFT EPIDIDYMIS: The left epididymal head measures 1.2 x 1 x 1.1 mm and is within normal limits.   Mild scrotal skin thickening is noted. No sonographic abnormality was noted within the patient has localized area of pain.       1. Mild scrotal skin thickening and trace volume bilateral scrotal hydroceles. Otherwise, unremarkable sonographic evaluation of the scrotum. 2. No sonographic abnormality is noted within the patient localized area of pain. Recently  described fluid collections within the peritoneum and along the corpus spongiosum are better appreciated on CT of the abdomen and pelvis from 11/09/2023.   I personally reviewed the images/study and I agree with the findings as stated above by resident physician, Dr. Parth Mccollum. The study was interpreted at [facility] in [facility].   MACRO: None     Dictation workstation:   [id number]    CT chest w IV contrast    Result Date: 11/10/2023  Interpreted By:  Elsa Berger  and Zelalem Alvarez STUDY: CT CHEST W IV CONTRAST;  11/10/2023 6:47 am   INDICATION: Signs/Symptoms:cavitary lung lesions on CT AP.   COMPARISON: None.   ACCESSION NUMBER(S): [id number]   ORDERING CLINICIAN: MARLEE KIM   TECHNIQUE: Helical data acquisition of the chest was obtained following intravenous administration of 80 mL of Omnipaque 350. Images were reformatted in axial, coronal, and sagittal planes.   FINDINGS: LUNGS AND AIRWAYS: The trachea and central airways are patent. No endobronchial lesion is seen. There are multiple subcentimeter pulmonary nodules throughout the bilateral lungs. Again, some of these are solid and some are cavitary. Representative examples include a 0.7 cm lower lobe cavitary lesion (series 204, image 145) and a 0.5 cm solid right lower lobe nodule (series 204, image 189).   Mild mosaic attenuation of the lungs.. Bibasilar subsegmental atelectatic changes. There is no evidence of pneumothorax. There is no pleural effusion. There is no focal consolidative opacity.   There is mild emphysematous changes in the upper lungs.   MEDIASTINUM AND GILBERT, LOWER NECK AND AXILLA: The visualized thyroid gland is within normal limits. There are multiple prominent to perihilar lymph nodes seen, which are nonspecific, but are likely felt to be reactive in nature. Esophagus appears within normal limits as seen.   HEART AND VESSELS: The thoracic aorta normal in course and  caliber.There is moderate atherosclerosis present, including calcified and noncalcified plaques.Although, the study is not tailored for evaluation of aorta, there is no definite evidence of acute aortic pathology. Main pulmonary artery is normal in caliber. Minimal coronary artery calcifications are seen. Please note, the study is not optimized for evaluation of coronary arteries. The cardiac chambers are not enlarged. There is no pericardial effusion seen.   UPPER ABDOMEN: There is diffuse hypoattenuation of visualized liver, suggestive of steatosis. Correlate with liver function tests.   CHEST WALL AND OSSEOUS STRUCTURES: Chest wall is within normal limits. No acute osseous pathology.There are no suspicious osseous lesions.Moderate multilevel degenerative changes within visualized spine.       1. Redemonstration of numerous subcentimeter pulmonary nodules measuring up to 0.7 cm. Some solid, and some demonstrating central cavitation. Correlate with concern for metastatic disease. Atypical/fungal infection is in the differential diagnosis. 2. Background mild emphysema in the upper lungs. 3. Minimal coronary artery calcification. 4. Suggestion of diffuse hepatic steatosis.   I personally reviewed the images/study and I agree with the findings as stated by Antonio Masterson MD. This study was interpreted at [facility], [place], [place]   Signed by: Elsa Harper 11/10/2023 8:44 AM Dictation workstation:   [id number]    XR chest 1 view    Result Date: 11/10/2023  Interpreted By:  Cain Prado and Barbat Antonio STUDY: XR CHEST 1 VIEW;  11/10/2023 6:44 am   INDICATION: Per EMR: Nodules on CT.   COMPARISON: Chest radiograph dated 05/13/2020.   ACCESSION NUMBER(S): [id number]   ORDERING CLINICIAN: SALMA CHAVES   FINDINGS: AP radiograph of the chest was provided.     CARDIOMEDIASTINAL SILHOUETTE: Cardiomediastinal silhouette is normal in size and configuration.   LUNGS: The  "previously described innumerable solid and cavitary nodular opacities throughout the bilateral lung fields are better evaluated on same day CT chest. No pulmonary edema. No pleural effusion. No pneumothorax.   ABDOMEN: No remarkable upper abdominal findings.   BONES: No acute osseous changes.       1. The previously described innumerable solid and cavitary nodular opacities throughout the bilateral lung fields are better evaluated on same day CT chest. 2. Otherwise, no evidence of an acute cardiopulmonary process.   I personally reviewed the images/study and I agree with the findings as stated by Antonio Masterson MD. This study was interpreted at [place], [place]   MACRO: None   Signed by: Cain Prado 11/10/2023 8:42 AM Dictation workstation:   [id number]    CT abdomen pelvis w IV contrast    Result Date: 11/10/2023  Interpreted By:  Perry Cassidy and Barbat Antonio STUDY: CT ABDOMEN PELVIS W IV CONTRAST;  11/9/2023 11:13 pm   INDICATION: Signs/Symptoms:UTI symptoms and urethral discharge with history of epidydmal, perianal and perirectal abscess. Concerning for abscess.   Per EMR: 5 day history of urinary symptoms with progressively worsening left scrotal pain. Patient was diagnosed with a urethral abscess about 2-3 years ago, which has continued to \"intermittently flare up over the past couple years\" . Physical exam: Yellow purulent discharge actively draining from the urethra. Tenderness to palpation with slight swelling to the left perianal region between the scrotum and rectum, concerning for abscess and/or cellulitis.   COMPARISON: CT abdomen and pelvis with IV contrast dated 10/17/2022 and 11/06/2021.   ACCESSION NUMBER(S): [id number]   ORDERING CLINICIAN: ALICE JESUS   TECHNIQUE: Contiguous axial images of the abdomen and pelvis were obtained after the intravenous administration of 100 mL Omnipaque 350 contrast. Coronal and sagittal reformatted images " were reconstructed from the axial data.   FINDINGS: LOWER CHEST: There are 1-2 dozen new subcentimeter pulmonary nodules in all lobes of both visualized lung bases. Some of these are solid and some of these are cavitary. For example, there are cavitary nodules in the left lower lobe (x2) measuring 0.5 cm and 0.6 cm (axial image 5, 34; series 205 and cavitary nodule in the right lower lobe measuring 0.4 cm (axial image 3240, series 205)). The remainder are solid pulmonary nodules as annotated on the images.   ABDOMEN/PELVIS:   ABDOMINAL WALL: No significant abnormality.   LIVER: Mildly nodular contour and hepatic atrophy consistent with cirrhosis. No suspicious lesion. Hypoattenuation adjacent the falciform ligament likely representing focal fatty infiltration. Unchanged subcapsular calcification adjacent to the right hepatic lobe posteriorly.   BILE DUCTS: No significant intrahepatic or extrahepatic dilatation.   GALLBLADDER: No significant abnormality.   PANCREAS: No significant abnormality.   SPLEEN: No significant abnormality. Note is made of an accessory splenule.   ADRENALS: No significant abnormality.   KIDNEYS, URETERS, BLADDER: There is mild unchanged scarring in the upper pole of the right kidney. No hydroureteronephrosis or nephroureterolithiasis. The urinary bladder wall thickness appears within normal limits for degree of distention.   REPRODUCTIVE ORGANS: The prostate gland is normal in size without associated inflammatory changes. The seminal vesicles are normal in size without inflammatory changes. There is extensive scarring in the perineum and base of penis due to multiple debridements and repeated infections. For example there is a healed sinus tract extending from base of penis to the perineum that was unchanged dating back to at least 11/06/2021 (axial image 134/212). Just superior to this, there is a linear/elongated hypodense fluid collection measuring 4.1 cm x 2.4 cm x 0.6 cm (AP X  craniocaudal X transverse) that is in the identical location to the fluid collection seen on 11/06/2021 at the base of the penis along the left aspect of the corpus spongiosum, and is significantly smaller in size from that time (4 cm x 1.7 cm x 2.8 cm); however, it is increased in size from the MRI 04/16/2023 at which time it measured approximately 1.4 cm x 0.5 cm in AP and transverse dimensions, respectively. There is a small right hydrocele similar to 11/06/2021. There is mild scrotal wall thickening and an area of confluent edema/fluid in the posterior wall of the scrotum that could be related to an abscess though no rim enhancing component is identified.   VESSELS: Mild aortic atherosclerosis without AAA.   RETROPERITONEUM/LYMPH NODES: Several prominent to mildly enlarged bilateral external iliac lymph nodes are similar to prior study and likely chronically reactive. For example there is a 1.6 cm right external iliac lymph node and a 1 cm left external iliac lymph node. No acute retroperitoneal abnormality.   BOWEL/MESENTERY/PERITONEUM:  No inflammatory bowel wall thickening or dilatation. Normal appendix. No ascites, free air, or intraperitoneal fluid collection.   MUSCULOSKELETAL: No acute osseous abnormality. Mild degenerative changes of the visualized thoracolumbar spine.       1. Innumerable new subcentimeter cavitary and solid pulmonary nodules throughout all lobes of both lung bases measuring up to 0.6 cm. No associated consolidation. Differential diagnosis includes fungal infection, either active or remote sequela thereof, and metastatic disease of unknown primary. There is no evidence for a primary malignancy in the abdomen or pelvis. Recommend full CT chest to definitively exclude any possibly of malignancy in the upper lungs. Recommend workup for fungal infection. 2. Elongated hypodense fluid collection in the left aspect of the perineum along the base of penis adjacent to left aspect of corpus  spongiosum measuring 4.1 cm x 2.4 cm x 0.6 cm that is increased in size from 04/16/2023 MRI and identical in location to the much larger abscess noted on 11/06/2021. Again this is concerning for periurethral abscess in the appropriate clinical context. 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 3. There is also new thickening and confluent fluid in the posterior right scrotal wall that could relate to abscess or reactive fluid. This would be better evaluated with dedicated scrotal ultrasound. 4. Cirrhotic liver.     I personally reviewed the images/study and I agree with the findings as stated by Antonio Masterson MD. This study was interpreted at [facility], [place], [place]   MACRO: Perry Cassidy discussed the significance and urgency of this critical finding by telephone with  Donny Rai on 11/10/2023 at 4:13 am.  (**-RCF-**) Findings:  See findings.     Signed by: Perry Cassidy 11/10/2023 4:14 AM Dictation workstation:   [id number]           Assessment/Plan   53 y.o. 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 ED workup revealed leucocytosis, UA concerning for infection, CT/AP periurethral abscess and a new scrotal abscess, Pt HDS, will continue broad spectrum Abx, Urology following. No planned interventions at this point. Patient is stable, will continue IV antibiotics. Improving. Likely to discharge home once able to clarify satisfactory regimen with ID.  Will need close follow-up for pulmonary nodules and repeat imaging in 2 to 3 months.    Updates 11/12/23:  -continue broad spectrum antibiotics  -Sending serum cryptococcal antigen, TB spot, AFB sputum culture, urine  AFB culture, histoplasma antigen, UA    #Batsheva-Urethral Abscess, measuring 4.1 cm x 2.4 cm x 0.6 cm   #Recurrent UTI  #HX of complex urethral stricture, s/p urethroplasty 4/21 Dr. Quiroz  #Mild scrotal skin thickening, trace bilateral scrotal hydrocele   ::No targetable area of fluctuance   - continue with Vancomycin and Zosyn  - Oxycodone 0.5mg q6h  - UA: Large leuk esterase, ketones, blood, protein, negative nitrates.  - Follow Ucx. NGTD at this time   - STI testing negative   - Scrotal U/S 11/10: mild skin thickening and described fluid collections re- findings demonstrated as per CT  - ESR: 46 CRP 11.41   - Trend CBCs  - Urology following: No planned interventions at this time  -ID following: Recommend continuing broad-spectrum antibiotics, recommend outpatient follow-up with PCP and repeat chest imaging in 6 months, sending serum cryptococcal antigen, TB spot, AFB sputum culture, urine AFB culture, histoplasma antigen, UA     #Innumerable Cavitary lesion in lungs   :pt not coughing, no sputum production, no hypoxia no hx of weight loss, found incidentally  -follow up fungal screen, B-D Glucan,Galactoman, AFB-sputum, histo antigen   -CT Chest 11/10: Redemonstration of numerous subcentimeter pulmonary nodules.  Some demonstrating central cavitation.  Possible concern for metastatic disease.    #Imaging findings concerning for Cirrhosis  -Mildly nodular contour and hepatic atrophy consistent with cirrhosis on CT abdomen  - liver enzymes within normal limits  -Will need to follow-up with PCP and possibly gastroenterology outpatient  -We will continue to monitor    #Type 2DM, Last A1c 11.6 11/10/23  #HLD  #HTN  Poorly controlled DM  [id number]  Pt reports he is on Lantus 80units.   - Continue Glargine 45 Units.  Will schedule 4 Units Lispro TID + M SSI.   - c/w Atorvastatin 80mg  - c/w home amlodipine 10 mg daily     F : PRN  E: PRN  N: regular diet  A: PIV   DVT Prophylaxis: Lovenox Subq 40     Code Status: full code  (confirmed on 11/11/23)   NOK:  Primary Emergency Contact: Joann Garner, Home Phone: [phone]    Tej Orozco MD

## 2023-11-12 NOTE — DISCHARGE INSTRUCTIONS
Mr. Earl,  You were admitted to the hospital after presenting with symptoms of pain with urination, foul-smelling discharge, and perineal pain. Our urology team saw you and decided there was no need for surgical intervention at this time. You were treated with broad spectrum antibiotics inpatient (vancomycin and zosyn) and will need to continue taking antibiotics (bactrim) for 14 days after you are discharged. While in the hospital we also saw pulmonary nodules and evidence of liver cirrhosis on CT imaging, so we recommend you see a hepatologist and pulmonologist outpatient. Your lung nodules should be followed up with repeat imaging in 6 months, this should be scheduled through your PCP, who we are also arranging you to establish care with. You will also need to follow with urology for further management of your urethral abscesses.   It was a pleasure caring for you.  Sincerely,  Your  Care Team    TO DO:  -Ensure you are taking bactrim twice a day for 14 days

## 2023-11-12 NOTE — CONSULTS
Inpatient consult to Infectious Diseases  Consult performed by: Sammie Alvarez MD  Consult ordered by: Daniel Coombs MD        Primary MD: Beltran Oates MD    Reason For Consult  Perineal/periurethral abscess    History Of Present Illness  Elilot Earl is a 53 y.o. male admitted on 11/09 for 5-day history of worsening perineal and left scrotal pain with development of urinary symptoms.  Patient has a history of complex urethral stricture secondary to recurrent urethral abscesses, status post dorsal onlay buccal mucosal bulbar urethroplasty in 04/2021, history of epididymal abscesses/perennial abscesses, recurrent UTI, uncontrolled IDDM 2 (A1c 11.6%), class III obesity with BMI 43.8, coronary artery disease, history of tobacco use.  On initial presentation patient was afebrile, hypertensive, mild leukocytosis up to 13 K with mild JULIETTE with creatinine 1.05.  UA was significant for pyuria with>50 WBC, positive leukocyte esterase, 3+ blood, glucose >500, positive ketones.  Patient underwent CT abdomen pelvis with contrast on 11/09 which demonstrated findings concerning for periurethral abscess 4.1 cm x 2.4 cm x 0.6 cm at base of the penis, larger in size when compared to MRI findings from April 2023 and in an identical location to a previous abscess noted on imaging from 2021.  Urine culture on 11/09 with no predominant organism.  GC chlamydia NAAT testing negative on 11/10.    Patient was incidentally found to have innumerable pulmonary nodular subcentimeter lesions, some with cavitation. 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  Does not wear mask (and not required).  No other recreational, occupational or environmental exposures to mold, yeast potential causative agents.    Patient endorsed improvement of symptoms since admission, denies any current urinary symptoms.  Patient  reports normal sexual function.    Patient reported chronic pain in bilateral feet.     Social History     Occupational History    Not on file   Tobacco Use    Smoking status: Every Day     Packs/day: 1.00     Years: 20.00     Additional pack years: 0.00     Total pack years: 20.00     Types: Cigarettes    Smokeless tobacco: Not on file   Substance and Sexual Activity    Alcohol use: Not Currently    Drug use: Yes     Types: Marijuana    Sexual activity: Yes     Travel History   Travel since 10/11/23    No documented travel since 10/11/23       Family History  No family history on file.    Allergies  Patient has no known allergies.     Immunization History   Administered Date(s) Administered    Moderna SARS-CoV-2 Vaccination 04/29/2021, 05/27/2021     Medications  Home medications:  Medications Prior to Admission   Medication Sig Dispense Refill Last Dose    amLODIPine (Norvasc) 10 mg tablet Take 1 tablet (10 mg) by mouth once daily.   Unknown    aspirin 81 mg chewable tablet Chew 1 tablet (81 mg) once daily.   Unknown    insulin glargine (Lantus Solostar U-100 Insulin) 100 unit/mL (3 mL) pen Inject 90 Units under the skin once daily at bedtime.   Unknown    losartan (Cozaar) 100 mg tablet Take 1 tablet (100 mg) by mouth once daily.   Unknown    metoprolol succinate XL (Toprol-XL) 25 mg 24 hr tablet Take 1 tablet (25 mg) by mouth once daily.   Unknown    cyclobenzaprine (Flexeril) 10 mg tablet Take 1 tablet (10 mg) by mouth 3 times a day as needed for muscle spasms.   Unknown    rosuvastatin (Crestor) 40 mg tablet Take 1 tablet (40 mg) by mouth once daily.   Unknown    sitaGLIPtin phosphate (Januvia) 100 mg tablet Take 1 tablet (100 mg) by mouth once daily.   Unknown     Current medications:  Scheduled medications  amLODIPine, 10 mg, oral, Daily  aspirin, 81 mg, oral, Daily  atorvastatin, 80 mg, oral, Nightly  enoxaparin, 40 mg, subcutaneous, q12h SHELLY  insulin glargine, 45 Units, subcutaneous, Nightly  insulin  lispro, 0-10 Units, subcutaneous, TID with meals  piperacillin-tazobactam, 4.5 g, intravenous, q6h  polyethylene glycol, 17 g, oral, Daily  vancomycin, 1,500 mg, intravenous, q12h      Continuous medications     PRN medications  PRN medications: dextrose 10 % in water (D10W), dextrose, glucagon, oxyCODONE       Objective  Range of Vitals (last 24 hours)  Heart Rate:  [78-96]   Temp:  [36.5 °C (97.7 °F)]   Resp:  [16-18]   BP: (149-151)/()   SpO2:  [93 %-97 %]   Daily Weight  11/09/23 : 127 kg (280 lb)    Body mass index is 43.84 kg/m².     Physical Exam     GENERAL APPEARANCE: 52y/o -American male, not ill appearing, alert and cooperative, and appears to be in no acute distress.  BMI 43.84.  Sitting upright off the side of hospital bed.  In good spirits.  HEAD: normocephalic  EYES: PERRL, EOMI. Conjunctivae normal  NOSE: No nasal discharge.  THROAT: Oral mucosa moist.   CARDIAC: Regular rate and rhythm. No murmurs appreciated.  LUNGS: Scant crackles noted throughout, on room air, normal work of breathing  ABDOMEN: Positive bowel sounds. Soft, nondistended, nontender. No guarding or rebound. No masses appreciated.    GENITOURINARY:  Examined with male chaperone Dr. Welsh present  Normal external genitalia, no ulcerations or lesions noted,   Significant tenderness on deep palpation of perineum  No perineal redness, swelling or fluctuance  No tenderness on movement or elevation of testicles or palpation of scrotum    MUSCULOSKELETAL: No joint erythema or tenderness appreciated. Symmetric muscular development.  NEUROLOGICAL: No focal deficits. Moving all 4 ext.  SKIN: Skin pale. No lesions or eruptions on exposed areas.  PSYCHIATRIC: Appropriate affect.        Relevant Results    Labs  Results from last 72 hours   Lab Units 11/11/23 0724 11/09/23 2029   WBC AUTO x10*3/uL 6.3 13.0*   HEMOGLOBIN g/dL 14.5 15.3   HEMATOCRIT % 46.1 47.3   PLATELETS AUTO x10*3/uL 217 198   NEUTROS PCT AUTO %  --  67.3    LYMPHO PCT MAN % 29.6  --    LYMPHS PCT AUTO %  --  20.8   MONO PCT MAN % 10.4  --    MONOS PCT AUTO %  --  9.9   EOSINO PCT MAN % 2.6  --    EOS PCT AUTO %  --  0.9     Results from last 72 hours   Lab Units 11/11/23  0724 11/09/23 2029   SODIUM mmol/L 139 133*   POTASSIUM mmol/L 3.8 4.1   CHLORIDE mmol/L 102 99   CO2 mmol/L 28 20*   BUN mg/dL 9 18   CREATININE mg/dL 0.87 1.05   GLUCOSE mg/dL 207* 284*   CALCIUM mg/dL 9.2 9.2   ANION GAP mmol/L 13 18   EGFR mL/min/1.73m*2 >90 85   PHOSPHORUS mg/dL 4.0  --      Results from last 72 hours   Lab Units 11/11/23 0724 11/09/23 2029   ALK PHOS U/L  --  79   BILIRUBIN TOTAL mg/dL  --  0.5   PROTEIN TOTAL g/dL  --  6.9   ALT U/L  --  13   AST U/L  --  9   ALBUMIN g/dL 3.6 4.1     Estimated Creatinine Clearance: 125 mL/min (by C-G formula based on SCr of 0.87 mg/dL).          Microscopic Only, Urine  Order: [id number] - Reflex for Order [id number]  Collected 11/9/2023 18:57       Status: Final result       Visible to patient: No (inaccessible in [facility])    0 Result Notes            Component  Ref Range & Units 2 d ago  (11/9/23) 10 mo ago  (1/5/23) 1 yr ago  (11/11/22) 1 yr ago  (10/18/22) 1 yr ago  (10/17/22) 1 yr ago  (10/15/22) 1 yr ago  (12/14/21)   WBC, Urine  1-5, NONE /HPF >50 Abnormal  26 Abnormal  R NONE SEEN R 1 R 15 High  R 5 High  R 61 Abnormal  R   RBC, Urine  NONE, 1-2, 3-5 /HPF >20 Abnormal  >182 Abnormal  R NONE SEEN R 1 R 296 High  R 1 R 23 Abnormal  R   Squamous Epithelial Cells, Urine  Reference range not established. /HPF 1-9 (SPARSE) 2 R NONE SEEN R 1 R FEW R FEW R <1 R   Resulting Agency [facility]              Specimen Collected: 11/09/23 18:57 Last Resulted: 11/09/23 19:27           Microbiology  Urine Culture  Order: [id number] - Reflex for Order [id number]  Collected 11/9/2023 18:57       Status: Final result       Visible to patient: No (inaccessible in  MyChart)    Specimen Information: Clean  Catch/Voided; Urine   0 Result Notes  Urine Culture No significant growth           Resulting Agency: [facility]           Specimen Collected: 11/09/23 18:57 Last Resulted: 11/10/23 21:43             Imaging  US SCROTUM WITH DOPPLER;  11/10/2023 9:11 am   Impression:     1. Mild scrotal skin thickening and trace volume bilateral scrotal  hydroceles. Otherwise, unremarkable sonographic evaluation of the  scrotum.  2. No sonographic abnormality is noted within the patient localized  area of pain. Recently described fluid collections within the  peritoneum and along the corpus spongiosum are better appreciated on  CT of the abdomen and pelvis from 11/09/2023.         CT CHEST W IV CONTRAST;  11/10/2023 6:47 am   Impression:     1. Redemonstration of numerous subcentimeter pulmonary nodules  measuring up to 0.7 cm. Some solid, and some demonstrating central  cavitation. Correlate with concern for metastatic disease.  Atypical/fungal infection is in the differential diagnosis.  2. Background mild emphysema in the upper lungs.  3. Minimal coronary artery calcification.  4. Suggestion of diffuse hepatic steatosis.     CT ABDOMEN PELVIS W IV CONTRAST;  11/9/2023 11:13 pm   Impression:     1. Innumerable new subcentimeter cavitary and solid pulmonary nodules  throughout all lobes of both lung bases measuring up to 0.6 cm. No  associated consolidation. Differential diagnosis includes fungal  infection, either active or remote sequela thereof, and metastatic  disease of unknown primary. There is no evidence for a primary  malignancy in the abdomen or pelvis. Recommend full CT chest to  definitively exclude any possibly of malignancy in the upper lungs.  Recommend workup for fungal infection.  2. Elongated hypodense fluid collection in the left aspect of the  perineum along the base of penis adjacent to left aspect of corpus  spongiosum measuring 4.1 cm x 2.4 cm x 0.6 cm that is increased in  size from 04/16/2023 MRI and identical  in location to the much larger  abscess noted on 11/06/2021. Again this is concerning for  periurethral abscess in the appropriate clinical context. This is  superimposed upon pre-existing perineal scarring from prior  fistula/sinus tract.  3. There is also new thickening and confluent fluid in the posterior  right scrotal wall that could relate to abscess or reactive fluid.  This would be better evaluated with dedicated scrotal ultrasound.  4. Cirrhotic liver.         Assessment/Plan        53 y.o. male admitted on 11/09 for 5-day history of worsening perineal and left scrotal pain with development of urinary symptoms.       Patient has a history of complex urethral stricture secondary to recurrent urethral abscesses, status post dorsal onlay buccal mucosal bulbar urethroplasty in 04/2021, history of epididymal abscesses/perennial abscesses, recurrent UTI, uncontrolled IDDM 2 (A1c 11.6%), class III obesity with BMI 43.8, coronary artery disease, history of tobacco use.       On initial presentation patient was afebrile, hypertensive, mild leukocytosis up to 13 K with mild JULIETTE with creatinine 1.05.  UA was significant for pyuria with>50 WBC, positive leukocyte esterase, 3+ blood, glucose >500, positive ketones.  Patient underwent CT abdomen pelvis with contrast on 11/09 which demonstrated findings concerning for periurethral abscess 4.1 cm x 2.4 cm x 0.6 cm at base of the penis, larger in size when compared to MRI findings from April 2023 and in an identical location to a previous abscess noted on imaging from 2021.  Urine culture on 11/09 with no predominant organism.  GC chlamydia NAAT testing negative on 11/10.    Patient was incidentally found to have innumerable pulmonary nodular subcentimeter lesions, some with cavitation.  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  No other occupational  "or environmental exposures to mold, yeast potential causative agents.  Serum Fungitell and galactomannan negative, histo antigen in process, HIV in process.    Patient endorsed improvement of  symptoms since admission, denies any current urinary symptoms.    We will reach out to micro lab to speciate any organisms present and urine culture if possible.      Impression:  Periurethral abscess due to unknown organism.         Has history or recurrent infection in setting of prior urethroplasty.       No sexual dysfunction       Currently stable and fluid does not appear to be amenable to diagnostic aspiration.    Pulmonary nodules, unknown etiology       Will need preliminary evaluation now and then follow up with PCP regarding repeat imaging in 2-3 months and / or further diagnostic workup    Recommendations:  Continue broad-spectrum antibiotics with vancomycin and Zosyn pending further identification  Recommend outpatient follow-up with PCP and repeat chest imaging in 6 months for pulmonary nodules.  Low suspicion for tuberculosis.  Counseled on smoking cessation.  Please send serum cryptococcal antigen,   TB SPOT assay    Send additional  AFB sputum culture prior to discharge   6.   Send urine histoplasma antigen  7.   Send repeat UA   8.   Send urine AFB culture    Patient seen and discussed with Dr. Welsh.    Sammie Alvarez MD  ID Fellow, PGY IV.  Pager Team B: [phone].     11/12/2023 Update and Edit (Dr. Welsh)  I spoke to lab 11/12/23 AM.  Less than 50 CFU of skin mandi - NO enteric bacilli and NO Staph. Aureus  Also, no recent or prior positive urine culture to aid with empiric treatment  Pyuria since Jan 2023    In setting of lung nodules could hypothesize occult cryptococcal infection involving  tract (though more often prostate) and, much less likely, tuberculosis (given \"sterile\" pyuria)      I saw and evaluated the patient. I personally obtained the key and critical portions of the history and " physical exam or was physically present for key and critical portions performed by the resident/fellow. I reviewed the resident/fellow's documentation and discussed the patient with the resident/fellow. I agree with the resident/fellow's medical decision making as documented in the note.    Huey Welsh MD

## 2023-11-13 ENCOUNTER — PHARMACY VISIT (OUTPATIENT)
Dept: PHARMACY | Facility: CLINIC | Age: 53
End: 2023-11-13
Payer: COMMERCIAL

## 2023-11-13 ENCOUNTER — DOCUMENTATION (OUTPATIENT)
Dept: INFECTIOUS DISEASES | Facility: HOSPITAL | Age: 53
End: 2023-11-13

## 2023-11-13 VITALS
OXYGEN SATURATION: 95 % | HEIGHT: 67 IN | SYSTOLIC BLOOD PRESSURE: 147 MMHG | BODY MASS INDEX: 43.95 KG/M2 | HEART RATE: 77 BPM | TEMPERATURE: 98.1 F | WEIGHT: 280 LBS | RESPIRATION RATE: 16 BRPM | DIASTOLIC BLOOD PRESSURE: 86 MMHG

## 2023-11-13 LAB
GLUCOSE BLD MANUAL STRIP-MCNC: 190 MG/DL (ref 74–99)
GLUCOSE BLD MANUAL STRIP-MCNC: 313 MG/DL (ref 74–99)
H CAPSUL AG UR QL: NOT DETECTED
SCAN RESULT: NORMAL
VANCOMYCIN SERPL-MCNC: 13.6 UG/ML (ref 5–20)

## 2023-11-13 PROCEDURE — RXMED WILLOW AMBULATORY MEDICATION CHARGE

## 2023-11-13 PROCEDURE — 2500000004 HC RX 250 GENERAL PHARMACY W/ HCPCS (ALT 636 FOR OP/ED): Performed by: INTERNAL MEDICINE

## 2023-11-13 PROCEDURE — 36415 COLL VENOUS BLD VENIPUNCTURE: CPT | Performed by: INTERNAL MEDICINE

## 2023-11-13 PROCEDURE — A4217 STERILE WATER/SALINE, 500 ML: HCPCS | Performed by: INTERNAL MEDICINE

## 2023-11-13 PROCEDURE — 2500000001 HC RX 250 WO HCPCS SELF ADMINISTERED DRUGS (ALT 637 FOR MEDICARE OP)

## 2023-11-13 PROCEDURE — 2500000004 HC RX 250 GENERAL PHARMACY W/ HCPCS (ALT 636 FOR OP/ED)

## 2023-11-13 PROCEDURE — 99238 HOSP IP/OBS DSCHRG MGMT 30/<: CPT

## 2023-11-13 PROCEDURE — 82947 ASSAY GLUCOSE BLOOD QUANT: CPT

## 2023-11-13 PROCEDURE — 80202 ASSAY OF VANCOMYCIN: CPT | Performed by: INTERNAL MEDICINE

## 2023-11-13 PROCEDURE — 96372 THER/PROPH/DIAG INJ SC/IM: CPT

## 2023-11-13 RX ORDER — METOPROLOL SUCCINATE 25 MG/1
25 TABLET, EXTENDED RELEASE ORAL DAILY
Qty: 30 TABLET | Refills: 0 | Status: SHIPPED | OUTPATIENT
Start: 2023-11-13 | End: 2023-12-13

## 2023-11-13 RX ORDER — INSULIN GLARGINE 100 [IU]/ML
80 INJECTION, SOLUTION SUBCUTANEOUS NIGHTLY
Qty: 30 ML | Refills: 0 | Status: SHIPPED | OUTPATIENT
Start: 2023-11-13 | End: 2023-12-20

## 2023-11-13 RX ORDER — SULFAMETHOXAZOLE AND TRIMETHOPRIM 800; 160 MG/1; MG/1
1 TABLET ORAL 2 TIMES DAILY
Qty: 28 TABLET | Refills: 0 | Status: SHIPPED | OUTPATIENT
Start: 2023-11-13 | End: 2023-11-27

## 2023-11-13 RX ORDER — INSULIN LISPRO 100 [IU]/ML
6 INJECTION, SOLUTION INTRAVENOUS; SUBCUTANEOUS
Status: DISCONTINUED | OUTPATIENT
Start: 2023-11-13 | End: 2023-11-13 | Stop reason: HOSPADM

## 2023-11-13 RX ORDER — ROSUVASTATIN CALCIUM 40 MG/1
40 TABLET, COATED ORAL DAILY
Qty: 30 TABLET | Refills: 0 | Status: SHIPPED | OUTPATIENT
Start: 2023-11-13 | End: 2023-12-13

## 2023-11-13 RX ORDER — INSULIN GLARGINE 100 [IU]/ML
55 INJECTION, SOLUTION SUBCUTANEOUS NIGHTLY
Status: DISCONTINUED | OUTPATIENT
Start: 2023-11-13 | End: 2023-11-13 | Stop reason: HOSPADM

## 2023-11-13 RX ORDER — AMLODIPINE BESYLATE 10 MG/1
10 TABLET ORAL DAILY
Qty: 30 TABLET | Refills: 0 | Status: SHIPPED | OUTPATIENT
Start: 2023-11-13 | End: 2023-12-13

## 2023-11-13 RX ORDER — NAPROXEN SODIUM 220 MG/1
81 TABLET, FILM COATED ORAL DAILY
Qty: 30 TABLET | Refills: 0 | Status: SHIPPED | OUTPATIENT
Start: 2023-11-13 | End: 2023-12-13

## 2023-11-13 RX ORDER — LOSARTAN POTASSIUM 100 MG/1
100 TABLET ORAL DAILY
Qty: 30 TABLET | Refills: 0 | Status: SHIPPED | OUTPATIENT
Start: 2023-11-13 | End: 2023-12-13

## 2023-11-13 RX ADMIN — VANCOMYCIN HYDROCHLORIDE 1500 MG: 5 INJECTION, POWDER, LYOPHILIZED, FOR SOLUTION INTRAVENOUS at 10:31

## 2023-11-13 RX ADMIN — ASPIRIN 81 MG CHEWABLE TABLET 81 MG: 81 TABLET CHEWABLE at 08:51

## 2023-11-13 RX ADMIN — ENOXAPARIN SODIUM 40 MG: 100 INJECTION SUBCUTANEOUS at 08:51

## 2023-11-13 RX ADMIN — INSULIN LISPRO 4 UNITS: 100 INJECTION, SOLUTION INTRAVENOUS; SUBCUTANEOUS at 08:58

## 2023-11-13 RX ADMIN — INSULIN LISPRO 1 UNITS: 100 INJECTION, SOLUTION INTRAVENOUS; SUBCUTANEOUS at 09:00

## 2023-11-13 RX ADMIN — PIPERACILLIN SODIUM AND TAZOBACTAM SODIUM 4.5 G: 4; .5 INJECTION, SOLUTION INTRAVENOUS at 02:03

## 2023-11-13 RX ADMIN — AMLODIPINE BESYLATE 10 MG: 10 TABLET ORAL at 08:51

## 2023-11-13 RX ADMIN — INSULIN LISPRO 4 UNITS: 100 INJECTION, SOLUTION INTRAVENOUS; SUBCUTANEOUS at 12:33

## 2023-11-13 RX ADMIN — PIPERACILLIN SODIUM AND TAZOBACTAM SODIUM 4.5 G: 4; .5 INJECTION, SOLUTION INTRAVENOUS at 08:52

## 2023-11-13 RX ADMIN — INSULIN LISPRO 4 UNITS: 100 INJECTION, SOLUTION INTRAVENOUS; SUBCUTANEOUS at 12:32

## 2023-11-13 ASSESSMENT — COGNITIVE AND FUNCTIONAL STATUS - GENERAL
DAILY ACTIVITIY SCORE: 24
MOBILITY SCORE: 24

## 2023-11-13 ASSESSMENT — PAIN SCALES - GENERAL: PAINLEVEL_OUTOF10: 0 - NO PAIN

## 2023-11-13 ASSESSMENT — PAIN - FUNCTIONAL ASSESSMENT: PAIN_FUNCTIONAL_ASSESSMENT: 0-10

## 2023-11-13 NOTE — CARE PLAN
The patient's goals for the shift include      The clinical goals for the shift include pt will rate his pain level < 5 by the end of the shift.

## 2023-11-13 NOTE — NURSING NOTE
11/13/2023 1538 Discharge Note  Patient discharged home with no needs. Discharge instructions discussed with patient, verbalized understanding. Aware of follow ups needed and prescriptions to  in [facility] pharmacy. Peripheral IV discontinued. Belongings gathered per patient. Patient ambulated off unit to stop at [facility] pharmacy for medications, left unit at 1530.----- Marilou Reese RN

## 2023-11-13 NOTE — DISCHARGE SUMMARY
Discharge Diagnosis  Urethral abscess    Issues Requiring Follow-Up  - 14 day course of bactrim  - follow up with pulmonology, hepatology, urology, PCP  - repeat CT imaging chest in 6 months for pulm nodules  - Lab results as below    Test Results Pending At Discharge  Pending Labs       Order Current Status    AFB Culture, Urine In process    Blastomyces antibodies In process    Coccidioides antibodies In process    Cryptococcal Antigen, CSF/Serum In process    Histoplasma antibodies In process    Histoplasma antigen, urine In process    T-Spot TB In process    AFB Culture/Smear Preliminary result    Fungal Culture/Smear Preliminary result            Hospital Course  Elliot Earl is a 53 y.o. 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 ED workup revealed leucocytosis, UA concerning for infection, CT/AP showed periurethral abscess and a new scrotal abscess., Urology followed inpatient without planned interventions. Scrotal U/S 11/10: mild skin thickening and described fluid collections re- findings demonstrated as per CT. Patient treated with IV antibiotics with vancomycin and Zosyn. Patient to be switched to po antibiotics with bactrim for 14 days of duration on discharge per ID recs. Urine culture negative. AFB culture, fungal culture, cryptococcal antigen collected, TB spot, histoplasma antigen pending- given CT showing innumerable incidental lung findings consistent with cavitary lesions vs nodules. Recommend repeat chest imaging in 6 months for pulmonary nodules.  Low suspicion for tuberculosis.  Counseled on smoking cessation. CT A/P with incidental findings of nodular liver contour and hepatic atrophy consisten with cirrhosis, although liver  enzymes wnl. Patient will be arranged for PCP follow up to monitor results. Patient arranged for follow up with urology, GI, and pulmonology.         Pertinent Physical Exam At Time of Discharge  Physical Exam  Constitutional:       General: He is not in acute distress.  HENT:      Head: Normocephalic and atraumatic.   Eyes:      Extraocular Movements: Extraocular movements intact.      Pupils: Pupils are equal, round, and reactive to light.   Cardiovascular:      Rate and Rhythm: Normal rate and regular rhythm.   Pulmonary:      Effort: Pulmonary effort is normal.      Breath sounds: Normal breath sounds.   Abdominal:      General: Abdomen is flat. There is no distension.      Palpations: Abdomen is soft.      Tenderness: There is no abdominal tenderness.   Musculoskeletal:         General: No swelling or tenderness.   Skin:     General: Skin is warm and dry.   Neurological:      General: No focal deficit present.      Mental Status: He is alert and oriented to person, place, and time.   Psychiatric:         Mood and Affect: Mood normal.         Behavior: Behavior normal.         Thought Content: Thought content normal.         Judgment: Judgment normal.         Home Medications     Medication List      START taking these medications     sulfamethoxazole-trimethoprim 800-160 mg tablet; Commonly known as:   Bactrim DS; Take 1 tablet by mouth 2 times a day for 14 days.     CHANGE how you take these medications     insulin glargine 100 unit/mL (3 mL) pen; Commonly known as: Lantus   Solostar U-100 Insulin; Inject 80 Units under the skin once daily at   bedtime.; What changed: how much to take     CONTINUE taking these medications     amLODIPine 10 mg tablet; Commonly known as: Norvasc; Take 1 tablet (10   mg) by mouth once daily.   aspirin 81 mg chewable tablet; Chew 1 tablet (81 mg) once daily.   losartan 100 mg tablet; Commonly known as: Cozaar; Take 1 tablet (100   mg) by mouth once daily.   metoprolol succinate XL  25 mg 24 hr tablet; Commonly known as:   Toprol-XL; Take 1 tablet (25 mg) by mouth once daily.   rosuvastatin 40 mg tablet; Commonly known as: Crestor; Take 1 tablet (40   mg) by mouth once daily.   sitaGLIPtin phosphate 100 mg tablet; Commonly known as: Januvia; Take 1   tablet (100 mg) by mouth once daily.     STOP taking these medications     cyclobenzaprine 10 mg tablet; Commonly known as: Flexeril       Outpatient Follow-Up  Future Appointments   Date Time Provider Department [place]   11/16/2023  3:00 PM Byron Osborne DO [id number]   12/11/2023  9:15 AM Venkat Quiroz MD [id number]       Gaby Ni MD

## 2023-11-13 NOTE — PROGRESS NOTES
"Vancomycin Dosing by Pharmacy- FOLLOW UP    Elliot Earl is a 53 y.o. year old male who Pharmacy has been consulted for vancomycin dosing for cellulitis, skin and soft tissue. Based on the patient's indication and renal status this patient is being dosed based on a goal AUC of 400-600.     Renal function is currently stable.    Current vancomycin dose: 1500 mg given every 12 hours    Estimated vancomycin AUC on current dose: 469 mg/L.hr     Visit Vitals  /86   Pulse 77   Temp 36.7 °C (98.1 °F)   Resp 16        Lab Results   Component Value Date    CREATININE 0.84 11/12/2023    CREATININE 0.87 11/11/2023    CREATININE 1.05 11/09/2023    CREATININE 0.9 11/11/2022    CREATININE 0.8 10/21/2022    CREATININE 0.8 10/17/2022    CREATININE 0.7 10/15/2022        Patient weight is No results found for: \"PTWEIGHT\"    No results found for: \"CULTURE\"     I/O last 3 completed shifts:  In: 1415 (11.1 mL/kg) [P.O.:715; IV Piggyback:700]  Out: 1905 (15 mL/kg) [Urine:1905 (0.4 mL/kg/hr)]  Weight: 127 kg   [unfilled]    Lab Results   Component Value Date    PATIENTTEMP 37.0 11/09/2023        Assessment/Plan    Within goal AUC range. Continue current vancomycin regimen.    AUC24,ss: 469 mg/L.hr  Probability of AUC24 > 400: 89 %  Ctrough,ss: 6.2 mg/L  Probability of Ctrough,ss > 20: 0 %    The next level will be obtained on 11/17 at 0500. May be obtained sooner if clinically indicated.   Will continue to monitor renal function daily while on vancomycin and order serum creatinine at least every 48 hours if not already ordered.  Follow for continued vancomycin needs, clinical response, and signs/symptoms of toxicity.       Kush Gibson, PharmD           "

## 2023-11-13 NOTE — PROGRESS NOTES
SW obtained signatures for Medicaid application and sent back to HRS. Pt did not have SS card or ID with him at this admission.    - Radha Arrieta MSW, LSW

## 2023-11-14 LAB
COCCIDIOIDES AB TITR SER CF: NORMAL {TITER}
CRYPTOC AG SPEC QL LA: NEGATIVE

## 2023-11-14 NOTE — PROGRESS NOTES
CHART UPDATE  Patient is eager for discharge and not seen  Patient discharged before ID rounds    Discussed in detail with primary team  Urine was not diagnostic.  Few skin mandi isolated but no Pseudomonas or enteric bacilli.  Uncertain etiology of periurethral abscess.  Some of this is chronic.  Reviewed CT scan of the chest and the few nodules can be worked up as an outpatient.  Can follow-up with PCP regarding T spot assay but suspicion for disseminated fungal infection involving the urethral tissue/prostatic tissue and lungs much lower.     Planned Bactrim double strength tablet p.o. twice daily x14 days  Follow-up with PCP  Follow-up with urology  Dr. Welsh will be available for questions if needed.  ID office [phone]  No ID clinic follow-up necessary at this time  We will follow-up on pending ID tests    Discussed with the team by phone and by secure chat     Huey Welsh MD  ID Staff  No charges with this encounter

## 2023-11-15 LAB
NIL(NEG) CONTROL SPOT COUNT: NORMAL
PANEL A SPOT COUNT: 0
PANEL B SPOT COUNT: 0
POS CONTROL SPOT COUNT: NORMAL
T-SPOT. TB INTERPRETATION: NEGATIVE

## 2023-11-16 ENCOUNTER — OFFICE VISIT (OUTPATIENT)
Dept: ORTHOPEDIC SURGERY | Facility: HOSPITAL | Age: 53
End: 2023-11-16
Payer: COMMERCIAL

## 2023-11-16 LAB
B DERMAT AB TITR SER: NEGATIVE {TITER}
H CAPSUL AG UR QL: NOT DETECTED
SCAN RESULT: NORMAL

## 2023-11-17 LAB
H CAPSUL AB SER QL ID: NOT DETECTED
H CAPSUL MYC AB TITR SER CF: NORMAL {TITER}
H CAPSUL YST AB TITR SER CF: NORMAL {TITER}

## 2023-11-27 LAB
FUNGUS SPEC CULT: NORMAL
FUNGUS SPEC FUNGUS STN: NORMAL

## 2024-01-03 LAB
ACID FAST STN SPEC: NORMAL
MYCOBACTERIUM SPEC CULT: NORMAL
MYCOBACTERIUM SPEC CULT: NORMAL

## 2024-02-12 ENCOUNTER — OFFICE VISIT (OUTPATIENT)
Dept: ORTHOPEDIC SURGERY | Facility: HOSPITAL | Age: 54
End: 2024-02-12
Payer: COMMERCIAL

## 2024-02-12 VITALS — WEIGHT: 280 LBS | BODY MASS INDEX: 43.95 KG/M2 | HEIGHT: 67 IN

## 2024-02-12 DIAGNOSIS — M17.0 OSTEOARTHRITIS OF BOTH KNEES, UNSPECIFIED OSTEOARTHRITIS TYPE: Primary | ICD-10-CM

## 2024-02-12 PROCEDURE — 99213 OFFICE O/P EST LOW 20 MIN: CPT | Performed by: EMERGENCY MEDICINE

## 2024-02-12 ASSESSMENT — PAIN DESCRIPTION - DESCRIPTORS: DESCRIPTORS: ACHING;SORE

## 2024-02-12 ASSESSMENT — PAIN SCALES - GENERAL: PAINLEVEL_OUTOF10: 8

## 2024-02-12 ASSESSMENT — PAIN - FUNCTIONAL ASSESSMENT: PAIN_FUNCTIONAL_ASSESSMENT: 0-10

## 2024-02-12 NOTE — PROGRESS NOTES
"Subjective   Elliot Earl is a 53 y.o. male who presents for Follow-up of the Right Knee and Follow-up of the Left Knee    HPI    2/12/24: Patient returns today for reevaluation for bilateral knee pain.  He has known moderate to severe bilateral knee DJD.  He has tried therapy in the past, multiple analgesics, and previous corticosteroid injection therapy without significant improvement.  He did have bilateral knee viscosupplementation injections with Dr. Zaidi that worked well in regards to pain control functionality.  Considering this, he would like to discuss the option of repeat bilateral knee viscosupplementation injection therapy.    8/4/23: 53-year-old male referred by Dr. Michele for bilateral knee pain. Patient has known bilateral knee DJD and has had multiple injections in the past, specifically he did have viscosupplementation injections in the past with Dr. Zaidi that worked quite well for him in regards to pain control functionality. He has history of left knee posttraumatic DJD and right knee DJD status post arthroscopy. He would like to discuss the option of repeat viscosupplementation injections for his knees.     ROS: All pertinent positive symptoms are included in the history of present illness.    All other systems have been reviewed and are negative and noncontributory to this patient's current ailments.    Objective     Vitals:    02/12/24 1126   Weight: 127 kg (280 lb)   Height: 1.702 m (5' 7\")       Physical Exam  General/Constitutional: No apparent distress. Well-nourished and well developed.  Head: Normocephalic, Atraumatic.   Eyes: EOMI.  Vascular: No edema, swelling or tenderness, except as noted in detailed exam.  Respiratory: Non-labored breathing.  Integumentary: No impressive skin lesions present, except as noted in detailed exam.  Neurological: Alert and oriented.  Psychological: Normal mood and affect.  Musculoskeletal: Normal, except as noted in detailed " exam.  Right Knee  Flexion 100. Extension 0. Crepitus present with knee flexion/extension. No ecchymosis. Muscle strength 5 out of 5 with flexion and extension. Valgus stress negative. Varus stress negative.   Left Knee  Flexion 100. Extension 0. Crepitus present with knee flexion/extension. No ecchymosis. Muscle strength 5 out of 5 with flexion and extension. Valgus stress negative. Varus stress negative.         Assessment/Plan   Problem List Items Addressed This Visit    None  Visit Diagnoses       Osteoarthritis of both knees, unspecified osteoarthritis type    -  Primary          I discussed with patient I agree that his pain is likely due to bilateral knee DJD. Considering that he is done well with viscosupplementation in the past in regards to pain control functionality, I do feel that repeat viscosupplementation is warranted. Therefore, we will begin preauthorization process for this. He will follow-up when these are approved. In the meantime, he can continue all activities as tolerated.  In the meantime, discussed with patient that he can use topical Voltaren gel 3-4 times daily for pain control.  I have given him information for this.  Will see him back for the viscosupplementation injections when they are approved.    Gary Osborne, DO  Sports Medicine  [facility]     ** Please excuse any errors in grammar or translation related to this dictation. Voice recognition software was utilized to prepare this document. **

## 2024-04-03 ENCOUNTER — APPOINTMENT (OUTPATIENT)
Dept: RADIOLOGY | Facility: HOSPITAL | Age: 54
End: 2024-04-03
Payer: COMMERCIAL

## 2024-04-03 ENCOUNTER — HOSPITAL ENCOUNTER (EMERGENCY)
Facility: HOSPITAL | Age: 54
Discharge: HOME | End: 2024-04-03
Attending: STUDENT IN AN ORGANIZED HEALTH CARE EDUCATION/TRAINING PROGRAM
Payer: COMMERCIAL

## 2024-04-03 VITALS
WEIGHT: 275 LBS | DIASTOLIC BLOOD PRESSURE: 92 MMHG | SYSTOLIC BLOOD PRESSURE: 164 MMHG | OXYGEN SATURATION: 95 % | BODY MASS INDEX: 43.16 KG/M2 | HEART RATE: 92 BPM | HEIGHT: 67 IN | RESPIRATION RATE: 16 BRPM | TEMPERATURE: 96.8 F

## 2024-04-03 DIAGNOSIS — M25.561 ACUTE PAIN OF RIGHT KNEE: ICD-10-CM

## 2024-04-03 DIAGNOSIS — M25.461 KNEE EFFUSION, RIGHT: Primary | ICD-10-CM

## 2024-04-03 PROCEDURE — 73564 X-RAY EXAM KNEE 4 OR MORE: CPT | Mod: RIGHT SIDE | Performed by: RADIOLOGY

## 2024-04-03 PROCEDURE — 2500000001 HC RX 250 WO HCPCS SELF ADMINISTERED DRUGS (ALT 637 FOR MEDICARE OP): Performed by: STUDENT IN AN ORGANIZED HEALTH CARE EDUCATION/TRAINING PROGRAM

## 2024-04-03 PROCEDURE — 99283 EMERGENCY DEPT VISIT LOW MDM: CPT

## 2024-04-03 PROCEDURE — 73564 X-RAY EXAM KNEE 4 OR MORE: CPT | Mod: RT

## 2024-04-03 RX ORDER — KETOROLAC TROMETHAMINE 10 MG/1
10 TABLET, FILM COATED ORAL EVERY 6 HOURS PRN
Qty: 20 TABLET | Refills: 0 | Status: SHIPPED | OUTPATIENT
Start: 2024-04-03 | End: 2024-04-08

## 2024-04-03 RX ORDER — KETOROLAC TROMETHAMINE 10 MG/1
10 TABLET, FILM COATED ORAL ONCE
Status: COMPLETED | OUTPATIENT
Start: 2024-04-03 | End: 2024-04-03

## 2024-04-03 RX ADMIN — KETOROLAC TROMETHAMINE 10 MG: 10 TABLET, FILM COATED ORAL at 03:49

## 2024-04-03 ASSESSMENT — PAIN DESCRIPTION - PROGRESSION: CLINICAL_PROGRESSION: NOT CHANGED

## 2024-04-03 ASSESSMENT — PAIN SCALES - GENERAL
PAINLEVEL_OUTOF10: 4
PAINLEVEL_OUTOF10: 0 - NO PAIN

## 2024-04-03 ASSESSMENT — PAIN - FUNCTIONAL ASSESSMENT
PAIN_FUNCTIONAL_ASSESSMENT: 0-10
PAIN_FUNCTIONAL_ASSESSMENT: 0-10

## 2024-04-03 ASSESSMENT — COLUMBIA-SUICIDE SEVERITY RATING SCALE - C-SSRS
5. HAVE YOU STARTED TO WORK OUT OR WORKED OUT THE DETAILS OF HOW TO KILL YOURSELF? DO YOU INTEND TO CARRY OUT THIS PLAN?: NO
6. HAVE YOU EVER DONE ANYTHING, STARTED TO DO ANYTHING, OR PREPARED TO DO ANYTHING TO END YOUR LIFE?: NO
2. HAVE YOU ACTUALLY HAD ANY THOUGHTS OF KILLING YOURSELF?: NO
1. IN THE PAST MONTH, HAVE YOU WISHED YOU WERE DEAD OR WISHED YOU COULD GO TO SLEEP AND NOT WAKE UP?: NO

## 2024-04-03 NOTE — ED PROVIDER NOTES
HPI   Chief Complaint   Patient presents with    Knee Pain     Pt stated he has right knee pain. Pt stated it has been going on a year. Pt stated he has been told it should be drained, but has not had it done yet. Knee is swollen and has limited movement. Pt has positive MSPs in toes.       Patient is a 53-year-old male that presents emergency department for evaluation of right knee pain.  Patient states he has had intermittent right knee pain for the last year with intermittent swelling patient states that pain started getting worse yesterday evening while he was at work.  He states that he is constantly moving at work and going from sitting to standing frequently.  Typically he is able to go home and there is swelling goes down however overnight the swelling has been getting worse with some aching and throbbing.  She denies redness, fever, chills.  He denies any trauma to the knee.      History provided by:  Patient                      No data recorded                   Patient History   Past Medical History:   Diagnosis Date    Acute myocardial infarction, unspecified (CMS/[facility]) 11/11/2020    Acute MI    Complex tear of medial meniscus, current injury, right knee, initial encounter 06/17/2020    Complex tear of medial meniscus of right knee as current injury, initial encounter    Cutaneous abscess of perineum 01/04/2021    Perineal abscess    Diabetes mellitus (CMS/[facility])     Effusion, right knee 09/15/2020    Knee effusion, right    Encounter for other specified aftercare 05/04/2021    Visit for wound check    Low back pain, unspecified 06/20/2018    Lumbar pain    Low back pain, unspecified 01/16/2019    Lumbar pain    Other conditions influencing health status 12/17/2020    History of cough    Other specified health status     No pertinent past medical history    Pain in left knee     Left knee pain, unspecified chronicity    Personal history of other diseases of male genital organs 07/09/2020    History of  balanitis    Radiculopathy, lumbar region 05/03/2019    Acute lumbar radiculopathy    Urinary tract infection, site not specified 05/04/2021    Acute urinary tract infection     Past Surgical History:   Procedure Laterality Date    OTHER SURGICAL HISTORY  07/09/2020    Knee surgery    OTHER SURGICAL HISTORY  07/09/2020    Abscess incision and drainage     No family history on file.  Social History     Tobacco Use    Smoking status: Every Day     Packs/day: 1.00     Years: 20.00     Additional pack years: 0.00     Total pack years: 20.00     Types: Cigarettes    Smokeless tobacco: Not on file   Vaping Use    Vaping Use: Never used   Substance Use Topics    Alcohol use: Yes    Drug use: Not Currently     Types: Marijuana       Physical Exam   ED Triage Vitals [04/03/24 0316]   Temperature Heart Rate Respirations BP   36 °C (96.8 °F) 90 18 (!) 175/99      Pulse Ox Temp src Heart Rate Source Patient Position   97 % -- -- --      BP Location FiO2 (%)     -- --       Physical Exam  Vitals and nursing note reviewed.   Constitutional:       General: He is not in acute distress.     Appearance: Normal appearance. He is not ill-appearing.   HENT:      Head: Normocephalic and atraumatic.   Cardiovascular:      Rate and Rhythm: Normal rate and regular rhythm.   Pulmonary:      Effort: Pulmonary effort is normal.   Musculoskeletal:         General: Swelling (Moderate swelling to the right knee) and tenderness (Mild tenderness outpatient of both the medial and lateral aspect of the knee.) present.      Comments: Patella tracks appropriately.  There is some mild locking of the knee with flexion   Skin:     General: Skin is warm and dry.      Findings: No erythema.   Neurological:      General: No focal deficit present.      Mental Status: He is alert.      Sensory: No sensory deficit.      Motor: No weakness.         ED Course & MDM   Diagnoses as of 04/03/24 0448   Knee effusion, right   Acute pain of right knee       Medical  Decision Making  Patient is a 53-year-old male that presents emergency room for evaluation of knee pain to the right knee.  He does have some mild swelling of the right knee however there is no warmth to touch, no erythema, no rashes or lesions noted.  History and exam more consistent with a possible bursitis versus possible meniscal injury.  There is no evidence of infection/septic arthritis at this time.  X-ray shows significant arthritis and tricompartmental disease however no evidence of fracture.  Patient will be given p.o. ketorolac and Ace wrap was applied.  Right lower extremities neurovascular intact and he will be given orthopedic surgeon to follow-up with as an outpatient for further management.        Procedure  Procedures     Asif Fu DO  04/03/24 0454

## 2024-04-11 ENCOUNTER — OFFICE VISIT (OUTPATIENT)
Dept: ORTHOPEDIC SURGERY | Facility: HOSPITAL | Age: 54
End: 2024-04-11
Payer: COMMERCIAL

## 2024-04-11 ENCOUNTER — HOSPITAL ENCOUNTER (OUTPATIENT)
Dept: RADIOLOGY | Facility: EXTERNAL LOCATION | Age: 54
Discharge: HOME | End: 2024-04-11

## 2024-04-11 DIAGNOSIS — M17.0 OSTEOARTHRITIS OF BOTH KNEES, UNSPECIFIED OSTEOARTHRITIS TYPE: Primary | ICD-10-CM

## 2024-04-11 PROCEDURE — 99213 OFFICE O/P EST LOW 20 MIN: CPT | Performed by: EMERGENCY MEDICINE

## 2024-04-11 RX ORDER — MELOXICAM 15 MG/1
15 TABLET ORAL DAILY
Qty: 30 TABLET | Refills: 0 | Status: SHIPPED | OUTPATIENT
Start: 2024-04-11 | End: 2024-05-11

## 2024-04-11 ASSESSMENT — ENCOUNTER SYMPTOMS: KNEE SWELLING: 1

## 2024-04-11 NOTE — PROGRESS NOTES
Subjective   Elliot Earl is a 53 y.o. male who presents for Follow-up of the Right Knee    Right Knee       4/11/24: Patient returns today for bilateral knee pain.  During his last visit, we discussed the option of viscosupplementation.  He has not yet obtained these and returns today for continued pain.  He would like to discuss additional treatment options.  He has been taking OTC Advil with temporary relief.    2/12/24: Patient returns today for reevaluation for bilateral knee pain.  He has known moderate to severe bilateral knee DJD.  He has tried therapy in the past, multiple analgesics, and previous corticosteroid injection therapy without significant improvement.  He did have bilateral knee viscosupplementation injections with Dr. Zaidi that worked well in regards to pain control functionality.  Considering this, he would like to discuss the option of repeat bilateral knee viscosupplementation injection therapy.    8/4/23: 53-year-old male referred by Dr. Michele for bilateral knee pain. Patient has known bilateral knee DJD and has had multiple injections in the past, specifically he did have viscosupplementation injections in the past with Dr. Zaidi that worked quite well for him in regards to pain control functionality. He has history of left knee posttraumatic DJD and right knee DJD status post arthroscopy. He would like to discuss the option of repeat viscosupplementation injections for his knees.     ROS: All pertinent positive symptoms are included in the history of present illness.    All other systems have been reviewed and are negative and noncontributory to this patient's current ailments.    Objective     There were no vitals filed for this visit.      Physical Exam  General/Constitutional: No apparent distress. Well-nourished and well developed.  Head: Normocephalic, Atraumatic.   Eyes: EOMI.  Vascular: No edema, swelling or tenderness, except as noted in detailed  exam.  Respiratory: Non-labored breathing.  Integumentary: No impressive skin lesions present, except as noted in detailed exam.  Neurological: Alert and oriented.  Psychological: Normal mood and affect.  Musculoskeletal: Normal, except as noted in detailed exam.  Right Knee  Flexion 100. Extension 0. Crepitus present with knee flexion/extension. No ecchymosis. Muscle strength 5 out of 5 with flexion and extension. Valgus stress negative. Varus stress negative.   Left Knee  Flexion 100. Extension 0. Crepitus present with knee flexion/extension. No ecchymosis. Muscle strength 5 out of 5 with flexion and extension. Valgus stress negative. Varus stress negative.         Assessment/Plan   Problem List Items Addressed This Visit    None  Visit Diagnoses       Osteoarthritis of both knees, unspecified osteoarthritis type    -  Primary    Relevant Orders    Point of Care Ultrasound (Completed)            He will continue to work on obtaining these injections.  In the meantime, for pain control, I discussed the option of of meloxicam 15 mg daily.  We discussed the risks versus benefits.  He would like to try this, so I have sent this to his pharmacy.  In the meantime, he will continue to work on obtaining the injections.  I will see him back for the injections when he is ready.      Gary Osborne, DO  Sports Medicine  [facility]     ** Please excuse any errors in grammar or translation related to this dictation. Voice recognition software was utilized to prepare this document. **

## 2024-07-15 ENCOUNTER — HOSPITAL ENCOUNTER (EMERGENCY)
Facility: HOSPITAL | Age: 54
Discharge: HOME | End: 2024-07-15

## 2024-07-15 VITALS
HEIGHT: 67 IN | WEIGHT: 280 LBS | TEMPERATURE: 97 F | OXYGEN SATURATION: 94 % | RESPIRATION RATE: 20 BRPM | HEART RATE: 94 BPM | BODY MASS INDEX: 43.95 KG/M2 | SYSTOLIC BLOOD PRESSURE: 164 MMHG | DIASTOLIC BLOOD PRESSURE: 94 MMHG

## 2024-07-15 DIAGNOSIS — J02.0 ACUTE STREPTOCOCCAL PHARYNGITIS: Primary | ICD-10-CM

## 2024-07-15 LAB — S PYO DNA THROAT QL NAA+PROBE: DETECTED

## 2024-07-15 PROCEDURE — 2500000004 HC RX 250 GENERAL PHARMACY W/ HCPCS (ALT 636 FOR OP/ED): Performed by: PHYSICIAN ASSISTANT

## 2024-07-15 PROCEDURE — 99283 EMERGENCY DEPT VISIT LOW MDM: CPT

## 2024-07-15 PROCEDURE — 96372 THER/PROPH/DIAG INJ SC/IM: CPT | Performed by: PHYSICIAN ASSISTANT

## 2024-07-15 PROCEDURE — 87651 STREP A DNA AMP PROBE: CPT | Performed by: PHYSICIAN ASSISTANT

## 2024-07-15 RX ORDER — AMOXICILLIN 500 MG/1
1000 CAPSULE ORAL DAILY
Qty: 20 CAPSULE | Refills: 0 | Status: SHIPPED | OUTPATIENT
Start: 2024-07-15 | End: 2024-07-25

## 2024-07-15 RX ORDER — ACETAMINOPHEN 325 MG/1
975 TABLET ORAL ONCE
Status: COMPLETED | OUTPATIENT
Start: 2024-07-15 | End: 2024-07-15

## 2024-07-15 RX ORDER — DEXAMETHASONE SODIUM PHOSPHATE 10 MG/ML
10 INJECTION INTRAMUSCULAR; INTRAVENOUS ONCE
Status: COMPLETED | OUTPATIENT
Start: 2024-07-15 | End: 2024-07-15

## 2024-07-15 ASSESSMENT — PAIN DESCRIPTION - ONSET: ONSET: ONGOING

## 2024-07-15 ASSESSMENT — PAIN DESCRIPTION - DESCRIPTORS: DESCRIPTORS: ACHING

## 2024-07-15 ASSESSMENT — LIFESTYLE VARIABLES
HAVE PEOPLE ANNOYED YOU BY CRITICIZING YOUR DRINKING: NO
TOTAL SCORE: 0
HAVE YOU EVER FELT YOU SHOULD CUT DOWN ON YOUR DRINKING: NO
EVER HAD A DRINK FIRST THING IN THE MORNING TO STEADY YOUR NERVES TO GET RID OF A HANGOVER: NO
EVER FELT BAD OR GUILTY ABOUT YOUR DRINKING: NO

## 2024-07-15 ASSESSMENT — COLUMBIA-SUICIDE SEVERITY RATING SCALE - C-SSRS
1. IN THE PAST MONTH, HAVE YOU WISHED YOU WERE DEAD OR WISHED YOU COULD GO TO SLEEP AND NOT WAKE UP?: NO
2. HAVE YOU ACTUALLY HAD ANY THOUGHTS OF KILLING YOURSELF?: NO
6. HAVE YOU EVER DONE ANYTHING, STARTED TO DO ANYTHING, OR PREPARED TO DO ANYTHING TO END YOUR LIFE?: NO

## 2024-07-15 ASSESSMENT — PAIN SCALES - GENERAL: PAINLEVEL_OUTOF10: 6

## 2024-07-15 ASSESSMENT — PAIN DESCRIPTION - PROGRESSION: CLINICAL_PROGRESSION: GRADUALLY WORSENING

## 2024-07-15 ASSESSMENT — PAIN - FUNCTIONAL ASSESSMENT: PAIN_FUNCTIONAL_ASSESSMENT: 0-10

## 2024-07-15 ASSESSMENT — PAIN DESCRIPTION - FREQUENCY: FREQUENCY: CONSTANT/CONTINUOUS

## 2024-07-15 ASSESSMENT — PAIN DESCRIPTION - PAIN TYPE: TYPE: ACUTE PAIN

## 2024-07-15 ASSESSMENT — PAIN DESCRIPTION - LOCATION: LOCATION: THROAT

## 2024-07-15 NOTE — DISCHARGE INSTRUCTIONS
Your strep throat test was positive.  Please take amoxicillin as prescribed.  You may use over-the-counter Tylenol as needed.  Ensure adequate hydration.  Return to the emergency department with new or worsening symptoms with the onset of new symptoms

## 2024-07-15 NOTE — ED PROVIDER NOTES
HPI   Chief Complaint   Patient presents with    Sore Throat     Sore throat, red spots on back of throat, fatigue, headache       Is a 54-year-old male presenting to the emergency department complaints of sore throat.  Patient states symptoms started yesterday.  He states that today his wife saw spots in the back of his throat and told him to come to the emergency room for evaluation.  Patient is able to swallow and talk without difficulty.  He stated just painful to swallow.  He did have a headache yesterday but no headache today.  No fever, chills.  He does have some nasal congestion and a cough.  No recent travel or known recent sick contacts.      Please see HPI for pertinent positive and negative ROS                No data recorded                   Patient History   Past Medical History:   Diagnosis Date    Acute myocardial infarction, unspecified ([facility]) 11/11/2020    Acute MI    Complex tear of medial meniscus, current injury, right knee, initial encounter 06/17/2020    Complex tear of medial meniscus of right knee as current injury, initial encounter    Cutaneous abscess of perineum 01/04/2021    Perineal abscess    Diabetes mellitus (Multi)     Effusion, right knee 09/15/2020    Knee effusion, right    Encounter for other specified aftercare 05/04/2021    Visit for wound check    Low back pain, unspecified 06/20/2018    Lumbar pain    Low back pain, unspecified 01/16/2019    Lumbar pain    Other conditions influencing health status 12/17/2020    History of cough    Other specified health status     No pertinent past medical history    Pain in left knee     Left knee pain, unspecified chronicity    Personal history of other diseases of male genital organs 07/09/2020    History of balanitis    Radiculopathy, lumbar region 05/03/2019    Acute lumbar radiculopathy    Urinary tract infection, site not specified 05/04/2021    Acute urinary tract infection     Past Surgical History:   Procedure Laterality Date     OTHER SURGICAL HISTORY  07/09/2020    Knee surgery    OTHER SURGICAL HISTORY  07/09/2020    Abscess incision and drainage     No family history on file.  Social History     Tobacco Use    Smoking status: Every Day     Current packs/day: 1.00     Average packs/day: 1 pack/day for 20.0 years (20.0 ttl pk-yrs)     Types: Cigarettes    Smokeless tobacco: Not on file   Vaping Use    Vaping status: Never Used   Substance Use Topics    Alcohol use: Yes    Drug use: Not Currently     Types: Marijuana       Physical Exam   ED Triage Vitals [07/15/24 1448]   Temperature Heart Rate Respirations BP   36.1 °C (97 °F) 94 20 (!) 164/94      Pulse Ox Temp Source Heart Rate Source Patient Position   94 % Tympanic Monitor Sitting      BP Location FiO2 (%)     Left arm --       Physical Exam  GENERAL APPEARANCE: Awake and alert. No acute respiratory distress.  Stridor or trismus.  Patient is talking in smooth sentences.  Handling oral secretions without any difficulties.  VITAL SIGNS: As per the nurses' triage record.  HEENT: Normocephalic, atraumatic. Extraocular muscles are intact. Conjunctiva are pink. Negative scleral icterus. Mucous membranes are moist. Tongue in the midline. Oropharynx clear, uvula midline.  Uvula is inflamed.  There is 1+ tonsillar hypertrophy bilaterally with tonsillar hypertrophy and exudate bilaterally.  NECK: Soft, nontender and supple, full gross ROM, no meningeal signs.  No tenderness palpation of the neck, no neck masses felt.  CHEST: Nontender to palpation. Clear to auscultation bilaterally. No rales, rhonchi, or wheezing. Symmetric rise and fall of chest wall.   HEART: Clear S1 and S2. Regular rate and rhythm. No murmurs appreciated on auscultation.  Strong and equal pulses in the extremities.  ABDOMEN: Soft, nontender, nondistended  MUSCULOSKELETAL: Full gross active range of motion. Ambulating on own with no acute difficulties  NEUROLOGICAL: Awake, alert and oriented x 3. Motor power intact in the  upper and lower extremities. Sensation is intact to light touch in the upper and lower extremities. Patient answering questions appropriately.   IMMUNOLOGICAL: No lymphatic streaking noted  DERMATOLOGIC: Warm and dry   PYSCH: Cooperative with appropriate mood and affect.  ED Course & MDM   Diagnoses as of 07/15/24 1733   Acute streptococcal pharyngitis       Medical Decision Making  Parts of this chart have been completed using voice recognition software. Please excuse any errors of transcription.  My thought process and reason for plan has been formulated from the time that I saw the patient until the time of disposition and is not specific to one specific moment during their visit and furthermore my MDM encompasses this entire chart and not only this text box.      HPI: Detailed above.    Exam: A medically appropriate exam performed, outlined above, given the known history and presentation.    History obtained from: Patient    Medications given during visit:  Medications   dexAMETHasone (Decadron) injection 10 mg (10 mg intramuscular Given 7/15/24 1555)   acetaminophen (Tylenol) tablet 975 mg (975 mg oral Given 7/15/24 1555)        Diagnostic/tests  Labs Reviewed   GROUP A STREPTOCOCCUS, PCR - Abnormal       Result Value    Group A Strep PCR Detected (*)       No orders to display        Considerations/further MDM:    Patient presents with mildly elevated blood pressure otherwise stable vital signs.  Differential diagnosis includes was not limited to group A strep pharyngitis versus viral pharyngitis/tonsillitis versus peritonsillar abscess versus retropharyngeal abscess.  Low concern for PTA as patient's uvula is midline with symmetrical swelling of the tonsils bilaterally.  Low concern for retropharyngeal abscess as patient does not have any pain in his neck and no tenderness palpation of the neck.  Group A strep was positive.  Patient was treated with oral Tylenol and IM dexamethasone to help with swelling of  the uvula.  He will be discharged with a prescription for amoxicillin.  Educated to return to the emergency department new or worsening symptoms with the onset of new symptoms.  Patient verbalized understanding felt comfortable discharge plan home.  He was discharged in stable condition.    Procedure  Procedures     Esther Astorga PA-C  07/15/24 1732

## 2024-08-15 ENCOUNTER — HOSPITAL ENCOUNTER (EMERGENCY)
Facility: HOSPITAL | Age: 54
Discharge: HOME | End: 2024-08-15
Attending: EMERGENCY MEDICINE
Payer: COMMERCIAL

## 2024-08-15 ENCOUNTER — APPOINTMENT (OUTPATIENT)
Dept: RADIOLOGY | Facility: HOSPITAL | Age: 54
End: 2024-08-15
Payer: COMMERCIAL

## 2024-08-15 VITALS
DIASTOLIC BLOOD PRESSURE: 114 MMHG | TEMPERATURE: 98.1 F | WEIGHT: 280 LBS | BODY MASS INDEX: 43.95 KG/M2 | OXYGEN SATURATION: 97 % | HEIGHT: 67 IN | SYSTOLIC BLOOD PRESSURE: 172 MMHG | HEART RATE: 89 BPM | RESPIRATION RATE: 20 BRPM

## 2024-08-15 DIAGNOSIS — E66.01 MORBID OBESITY (MULTI): ICD-10-CM

## 2024-08-15 DIAGNOSIS — S81.811A LACERATION OF RIGHT LOWER EXTREMITY, INITIAL ENCOUNTER: Primary | ICD-10-CM
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
## 2024-08-15 DIAGNOSIS — Z23 TETANUS TOXOID VACCINATION ADMINISTERED AT CURRENT VISIT: ICD-10-CM

## 2024-08-15 DIAGNOSIS — Z86.39 HISTORY OF DIABETES MELLITUS: ICD-10-CM

## 2024-08-15 PROCEDURE — 2500000004 HC RX 250 GENERAL PHARMACY W/ HCPCS (ALT 636 FOR OP/ED): Performed by: EMERGENCY MEDICINE

## 2024-08-15 PROCEDURE — 73590 X-RAY EXAM OF LOWER LEG: CPT | Mod: RT

## 2024-08-15 PROCEDURE — 73590 X-RAY EXAM OF LOWER LEG: CPT | Mod: RIGHT SIDE | Performed by: STUDENT IN AN ORGANIZED HEALTH CARE EDUCATION/TRAINING PROGRAM

## 2024-08-15 PROCEDURE — 99283 EMERGENCY DEPT VISIT LOW MDM: CPT | Mod: 25

## 2024-08-15 PROCEDURE — 90471 IMMUNIZATION ADMIN: CPT | Performed by: EMERGENCY MEDICINE

## 2024-08-15 PROCEDURE — 2500000001 HC RX 250 WO HCPCS SELF ADMINISTERED DRUGS (ALT 637 FOR MEDICARE OP): Performed by: EMERGENCY MEDICINE

## 2024-08-15 PROCEDURE — 90715 TDAP VACCINE 7 YRS/> IM: CPT | Performed by: EMERGENCY MEDICINE

## 2024-08-15 PROCEDURE — 12002 RPR S/N/AX/GEN/TRNK2.6-7.5CM: CPT | Performed by: EMERGENCY MEDICINE

## 2024-08-15 RX ORDER — ACETAMINOPHEN 325 MG/1
975 TABLET ORAL ONCE
Status: COMPLETED | OUTPATIENT
Start: 2024-08-15 | End: 2024-08-15

## 2024-08-15 RX ORDER — ACETAMINOPHEN 325 MG/1
650 TABLET ORAL EVERY 6 HOURS PRN
Qty: 30 TABLET | Refills: 0 | Status: SHIPPED | OUTPATIENT
Start: 2024-08-15 | End: 2024-08-25

## 2024-08-15 RX ORDER — IBUPROFEN 400 MG/1
400 TABLET ORAL ONCE
Status: COMPLETED | OUTPATIENT
Start: 2024-08-15 | End: 2024-08-15

## 2024-08-15 RX ORDER — IBUPROFEN 600 MG/1
600 TABLET ORAL EVERY 8 HOURS PRN
Qty: 30 TABLET | Refills: 0 | Status: SHIPPED | OUTPATIENT
Start: 2024-08-15

## 2024-08-15 RX ORDER — CEPHALEXIN 500 MG/1
500 CAPSULE ORAL 2 TIMES DAILY
Qty: 14 CAPSULE | Refills: 0 | Status: SHIPPED | OUTPATIENT
Start: 2024-08-15 | End: 2024-08-22

## 2024-08-15 RX ORDER — CEPHALEXIN 500 MG/1
500 CAPSULE ORAL ONCE
Status: COMPLETED | OUTPATIENT
Start: 2024-08-15 | End: 2024-08-15

## 2024-08-15 ASSESSMENT — PAIN DESCRIPTION - PAIN TYPE: TYPE: ACUTE PAIN

## 2024-08-15 ASSESSMENT — PAIN SCALES - GENERAL: PAINLEVEL_OUTOF10: 3

## 2024-08-15 ASSESSMENT — PAIN - FUNCTIONAL ASSESSMENT: PAIN_FUNCTIONAL_ASSESSMENT: 0-10

## 2024-08-15 ASSESSMENT — LIFESTYLE VARIABLES
TOTAL SCORE: 0
HAVE PEOPLE ANNOYED YOU BY CRITICIZING YOUR DRINKING: NO
HAVE YOU EVER FELT YOU SHOULD CUT DOWN ON YOUR DRINKING: NO
EVER FELT BAD OR GUILTY ABOUT YOUR DRINKING: NO
EVER HAD A DRINK FIRST THING IN THE MORNING TO STEADY YOUR NERVES TO GET RID OF A HANGOVER: NO

## 2024-08-15 ASSESSMENT — PAIN DESCRIPTION - ORIENTATION: ORIENTATION: RIGHT;LOWER

## 2024-08-15 ASSESSMENT — PAIN DESCRIPTION - DESCRIPTORS: DESCRIPTORS: ACHING;BURNING

## 2024-08-15 ASSESSMENT — PAIN DESCRIPTION - LOCATION: LOCATION: LEG

## 2024-08-15 ASSESSMENT — PAIN DESCRIPTION - PROGRESSION: CLINICAL_PROGRESSION: NOT CHANGED

## 2024-08-15 NOTE — ED NOTES
Pt presents ambulatory through triage with a laceration to the right calf. Pt was at work and stated he cut it on a metal bucket. Pt is unsure when his last tetanus was. Pt's leg is wrapped with abd and an ace wrap on arrival. This RN removed the bandage and bleeding is not currently controlled. RN rewrapped pt's leg and notified MD. Pt states his pain is tolerable right now and denied any pain medication.     Joana Champagne RN  08/15/24 0006

## 2024-08-15 NOTE — ED PROVIDER NOTES
HPI   Chief Complaint   Patient presents with    Extremity Laceration     Right calf       54-year-old male with a history of hypertension, type 2 diabetes, obesity and nicotine dependence comes to the emergency department with a chief complaint of laceration.  Patient was at work when he was carrying a metal bucket.  When swinging the bucket he caused a laceration to his right calf.  He states that the blood was just coming out of it and a pressure dressing was placed and he came to the emergency department.  He denies the use of any blood thinners and currently denies any headache, shortness of breath, dizziness, unsteady gait.  He is also denying any numbness or tingling distal to the wound or weakness.  He is uncertain as to his last tetanus booster.      History provided by:  Patient   used: No            Patient History   Past Medical History:   Diagnosis Date    Acute myocardial infarction, unspecified (Multi) 11/11/2020    Acute MI    Complex tear of medial meniscus, current injury, right knee, initial encounter 06/17/2020    Complex tear of medial meniscus of right knee as current injury, initial encounter    Cutaneous abscess of perineum 01/04/2021    Perineal abscess    Diabetes mellitus (Multi)     Effusion, right knee 09/15/2020    Knee effusion, right    Encounter for other specified aftercare 05/04/2021    Visit for wound check    Low back pain, unspecified 06/20/2018    Lumbar pain    Low back pain, unspecified 01/16/2019    Lumbar pain    Other conditions influencing health status 12/17/2020    History of cough    Other specified health status     No pertinent past medical history    Pain in left knee     Left knee pain, unspecified chronicity    Personal history of other diseases of male genital organs 07/09/2020    History of balanitis    Radiculopathy, lumbar region 05/03/2019    Acute lumbar radiculopathy    Urinary tract infection, site not specified 05/04/2021    Acute  urinary tract infection     Past Surgical History:   Procedure Laterality Date    OTHER SURGICAL HISTORY  07/09/2020    Knee surgery    OTHER SURGICAL HISTORY  07/09/2020    Abscess incision and drainage     No family history on file.  Social History     Tobacco Use    Smoking status: Every Day     Current packs/day: 1.00     Average packs/day: 1 pack/day for 20.0 years (20.0 ttl pk-yrs)     Types: Cigarettes    Smokeless tobacco: Not on file   Vaping Use    Vaping status: Never Used   Substance Use Topics    Alcohol use: Yes    Drug use: Not Currently     Types: Marijuana       Physical Exam   ED Triage Vitals [08/15/24 0441]   Temperature Heart Rate Respirations BP   36.7 °C (98.1 °F) 89 20 (!) 199/102      Pulse Ox Temp Source Heart Rate Source Patient Position   97 % Oral Monitor --      BP Location FiO2 (%)     -- --       Physical Exam  Vitals and nursing note reviewed.   Constitutional:       General: He is not in acute distress.     Appearance: He is well-developed.   HENT:      Head: Normocephalic and atraumatic.   Eyes:      Conjunctiva/sclera: Conjunctivae normal.   Cardiovascular:      Rate and Rhythm: Normal rate and regular rhythm.      Heart sounds: No murmur heard.  Pulmonary:      Effort: Pulmonary effort is normal. No respiratory distress.      Breath sounds: Normal breath sounds.   Abdominal:      Palpations: Abdomen is soft.      Tenderness: There is no abdominal tenderness.   Musculoskeletal:         General: Signs of injury present. No swelling or deformity.      Cervical back: Neck supple.      Comments: Medial right lower extremity with 3.5 cm laceration without active bleeding or visible foreign body   Skin:     General: Skin is warm and dry.      Capillary Refill: Capillary refill takes less than 2 seconds.      Coloration: Skin is not jaundiced or pale.      Findings: No rash.   Neurological:      Mental Status: He is alert.   Psychiatric:         Mood and Affect: Mood normal.           ED  Course & MDM   ED Course as of 08/17/24 0754   Thu Aug 15, 2024   0548 XR tibia fibula right 2 views  IMPRESSION:  1.  No evidence of fracture or traumatic malalignment in the right  tibia/fibula.  2. No radiopaque foreign body is present in the calf, although a 4 mm  radiodense foreign body appears to be present in the soft tissues  along the plantar aspect of the heel.   [EG]      ED Course User Index  [EG] Emma Tan MD         Diagnoses as of 08/17/24 0754   Laceration of right lower extremity, initial encounter   Accident at workplace   Tetanus toxoid vaccination administered at current visit   History of diabetes mellitus   Morbid obesity (Multi)                 No data recorded     [place] Coma Scale Score: 15 (08/15/24 0449 : Joana Champagne RN)                           Medical Decision Making    HPI:  As Above  PMHx/PSHx/Meds/Allergies/SH/FH as per nursing documentation and reviewed.  Review of systems: Total of 10 systems reviewed and otherwise negative except as noted elsewhere    DDX: As described in MDM    If performed, radiology listed above interpreted by me and confirmed by the Radiologist.  Medications administered during this visit (name and route): see MAR  Social determinants of health considered for this visit: lives at home  If performed, EKG interpreted by me and detailed above    [facility] Summary/considerations:  54-year-old male presenting with laceration that occurred at his workplace.  As he does not remember when his last tetanus booster was administered it was administered today.  The patient has multiple risk factors for wound complications including morbid obesity, smoking and diabetes.  He is being given a prescription for cephalexin for prophylactic antibiotics as this is a leg wound.  X-rays were performed and there is no evidence of foreign body and on exam there is no visible foreign body.  The wound was cleaned and irrigated with normal saline and then approximated with  sutures.  See seizure note for details.  Patient is instructed to follow-up with Workmen's Comp. or wound care in the next 2 to 3 days for a wound recheck.  He is instructed to have sutures removed by a medical professional in 10 days.  He will be discharged home.    Prescriptions provided include: Oral Keflex, Tylenol, ibuprofen    The patient was seen and triaged by our nursing/medic staff, their vitals were taken and the staff notes were reviewed.  If the patient arrived by an EMS squad or an outside agency, we discussed the case with transporting EMS medic, police, or other historians. My initial assessment was attention to their airway, breathing, and circulatory status.  We addressed any immediate or life threatening findings and completed a medical history and a physical exam if the patient or those legally responsible were in agreement with this.   Prior to the patient being discharged, I or my PA/NP or the nursing staff discussed the differential, results and discharge plan with the patient and/or family/friend/caregiver if present.  I emphasized the importance of follow-up in 2-3 days unless otherwise specified.  I explained reasons for the patient to return to the Emergency Department. Additional verbal discharge instructions were also given and discussed with the patient to supplement those generated by the EMR. We also discussed medications that were prescribed (if any) including common side effects and interactions. The patient was advised to abstain from driving, operating heavy machinery or making significant decisions while taking medications such as antihistamines, benzodiazepines, opiates and muscle relaxers. All questions were addressed.  They understand return precautions and discharge instructions. The patient and/or family/friend/caregiver expressed understanding.  **Disclaimer:  This note was dictated by speech recognition technology.  Minor errors in transcription may be present.  Please  contact for clarification or corrections.      Amount and/or Complexity of Data Reviewed  Radiology: ordered and independent interpretation performed. Decision-making details documented in ED Course.        Procedure  Laceration Repair    Performed by: Emma Tan MD  Authorized by: Emma Tan MD    Consent:     Consent obtained:  Verbal    Consent given by:  Patient    Risks, benefits, and alternatives were discussed: yes      Risks discussed:  Infection, pain, retained foreign body, poor cosmetic result, need for additional repair, tendon damage, vascular damage, poor wound healing and nerve damage  Universal protocol:     Patient identity confirmed:  Verbally with patient  Anesthesia:     Anesthesia method:  Local infiltration    Local anesthetic:  Lidocaine 1% w/o epi  Laceration details:     Location:  Leg    Leg location:  R lower leg    Length (cm):  3.5  Pre-procedure details:     Preparation:  Patient was prepped and draped in usual sterile fashion  Exploration:     Limited defect created (wound extended): no      Hemostasis achieved with:  Direct pressure    Imaging outcome: foreign body not noted      Wound exploration: wound explored through full range of motion and entire depth of wound visualized      Contaminated: no    Treatment:     Area cleansed with:  Chlorhexidine    Amount of cleaning:  Standard    Irrigation solution:  Sterile saline    Irrigation method:  Syringe    Undermining:  None    Scar revision: no    Skin repair:     Repair method:  Sutures    Suture size:  3-0    Suture material:  Nylon    Suture technique:  Simple interrupted    Number of sutures:  3  Approximation:     Approximation:  Close  Repair type:     Repair type:  Simple  Post-procedure details:     Dressing:  Antibiotic ointment    Procedure completion:  Tolerated       Emma Tan MD  08/17/24 0754

## 2024-08-15 NOTE — Clinical Note
Elliot Earl was seen and treated in our emergency department on 8/15/2024.  He may return to work on 08/17/2024.  For the next 10 days patient should keep the wound clean dry and covered.     If you have any questions or concerns, please don't hesitate to call.      Emma Tan MD

## 2024-10-18 ENCOUNTER — APPOINTMENT (OUTPATIENT)
Dept: RADIOLOGY | Facility: HOSPITAL | Age: 54
End: 2024-10-18
Payer: COMMERCIAL

## 2024-10-18 ENCOUNTER — HOSPITAL ENCOUNTER (EMERGENCY)
Facility: HOSPITAL | Age: 54
Discharge: HOME | End: 2024-10-18
Payer: COMMERCIAL

## 2024-10-18 VITALS
WEIGHT: 275 LBS | OXYGEN SATURATION: 98 % | SYSTOLIC BLOOD PRESSURE: 170 MMHG | TEMPERATURE: 98.1 F | HEART RATE: 95 BPM | BODY MASS INDEX: 43.16 KG/M2 | DIASTOLIC BLOOD PRESSURE: 117 MMHG | HEIGHT: 67 IN | RESPIRATION RATE: 16 BRPM

## 2024-10-18 DIAGNOSIS — N34.0: ICD-10-CM

## 2024-10-18 DIAGNOSIS — N39.0 URINARY TRACT INFECTION WITHOUT HEMATURIA, SITE UNSPECIFIED: Primary | ICD-10-CM

## 2024-10-18 LAB
ALBUMIN SERPL BCP-MCNC: 4.4 G/DL (ref 3.4–5)
ALP SERPL-CCNC: 61 U/L (ref 33–120)
ALT SERPL W P-5'-P-CCNC: 32 U/L (ref 10–52)
ANION GAP SERPL CALCULATED.3IONS-SCNC: 12 MMOL/L (ref 10–20)
APPEARANCE UR: CLEAR
AST SERPL W P-5'-P-CCNC: 16 U/L (ref 9–39)
BASOPHILS # BLD MANUAL: 0 X10*3/UL (ref 0–0.1)
BASOPHILS NFR BLD MANUAL: 0 %
BILIRUB SERPL-MCNC: 0.5 MG/DL (ref 0–1.2)
BILIRUB UR STRIP.AUTO-MCNC: NEGATIVE MG/DL
BUN SERPL-MCNC: 14 MG/DL (ref 6–23)
CALCIUM SERPL-MCNC: 8.7 MG/DL (ref 8.6–10.3)
CHLORIDE SERPL-SCNC: 102 MMOL/L (ref 98–107)
CO2 SERPL-SCNC: 25 MMOL/L (ref 21–32)
COLOR UR: ABNORMAL
CREAT SERPL-MCNC: 0.94 MG/DL (ref 0.5–1.3)
EGFRCR SERPLBLD CKD-EPI 2021: >90 ML/MIN/1.73M*2
EOSINOPHIL # BLD MANUAL: 0 X10*3/UL (ref 0–0.7)
EOSINOPHIL NFR BLD MANUAL: 0 %
ERYTHROCYTE [DISTWIDTH] IN BLOOD BY AUTOMATED COUNT: 15.9 % (ref 11.5–14.5)
GLUCOSE SERPL-MCNC: 284 MG/DL (ref 74–99)
GLUCOSE UR STRIP.AUTO-MCNC: ABNORMAL MG/DL
HCT VFR BLD AUTO: 50.3 % (ref 41–52)
HGB BLD-MCNC: 16.4 G/DL (ref 13.5–17.5)
HOLD SPECIMEN: NORMAL
IMM GRANULOCYTES # BLD AUTO: 0.04 X10*3/UL (ref 0–0.7)
IMM GRANULOCYTES NFR BLD AUTO: 0.6 % (ref 0–0.9)
KETONES UR STRIP.AUTO-MCNC: NEGATIVE MG/DL
LEUKOCYTE ESTERASE UR QL STRIP.AUTO: ABNORMAL
LYMPHOCYTES # BLD MANUAL: 1.56 X10*3/UL (ref 1.2–4.8)
LYMPHOCYTES NFR BLD MANUAL: 24 %
MCH RBC QN AUTO: 26.3 PG (ref 26–34)
MCHC RBC AUTO-ENTMCNC: 32.6 G/DL (ref 32–36)
MCV RBC AUTO: 81 FL (ref 80–100)
METAMYELOCYTES # BLD MANUAL: 0.07 X10*3/UL
METAMYELOCYTES NFR BLD MANUAL: 1 %
MONOCYTES # BLD MANUAL: 0.59 X10*3/UL (ref 0.1–1)
MONOCYTES NFR BLD MANUAL: 9 %
NEUTROPHILS # BLD MANUAL: 4.23 X10*3/UL (ref 1.2–7.7)
NEUTS BAND # BLD MANUAL: 0.07 X10*3/UL (ref 0–0.7)
NEUTS BAND NFR BLD MANUAL: 1 %
NEUTS SEG # BLD MANUAL: 4.16 X10*3/UL (ref 1.2–7)
NEUTS SEG NFR BLD MANUAL: 64 %
NITRITE UR QL STRIP.AUTO: NEGATIVE
NRBC BLD-RTO: 0 /100 WBCS (ref 0–0)
PH UR STRIP.AUTO: 5.5 [PH]
PLATELET # BLD AUTO: 193 X10*3/UL (ref 150–450)
POTASSIUM SERPL-SCNC: 4.5 MMOL/L (ref 3.5–5.3)
PROT SERPL-MCNC: 6.9 G/DL (ref 6.4–8.2)
PROT UR STRIP.AUTO-MCNC: ABNORMAL MG/DL
RBC # BLD AUTO: 6.24 X10*6/UL (ref 4.5–5.9)
RBC # UR STRIP.AUTO: ABNORMAL /UL
RBC #/AREA URNS AUTO: ABNORMAL /HPF
RBC MORPH BLD: NORMAL
SODIUM SERPL-SCNC: 134 MMOL/L (ref 136–145)
SP GR UR STRIP.AUTO: 1.03
SQUAMOUS #/AREA URNS AUTO: ABNORMAL /HPF
TOTAL CELLS COUNTED BLD: 100
UROBILINOGEN UR STRIP.AUTO-MCNC: NORMAL MG/DL
VARIANT LYMPHS # BLD MANUAL: 0.07 X10*3/UL (ref 0–0.5)
VARIANT LYMPHS NFR BLD: 1 %
WBC # BLD AUTO: 6.5 X10*3/UL (ref 4.4–11.3)
WBC #/AREA URNS AUTO: ABNORMAL /HPF

## 2024-10-18 PROCEDURE — 85007 BL SMEAR W/DIFF WBC COUNT: CPT

## 2024-10-18 PROCEDURE — 74177 CT ABD & PELVIS W/CONTRAST: CPT

## 2024-10-18 PROCEDURE — 74177 CT ABD & PELVIS W/CONTRAST: CPT | Performed by: RADIOLOGY

## 2024-10-18 PROCEDURE — 87086 URINE CULTURE/COLONY COUNT: CPT | Mod: WESLAB

## 2024-10-18 PROCEDURE — 81003 URINALYSIS AUTO W/O SCOPE: CPT

## 2024-10-18 PROCEDURE — 81001 URINALYSIS AUTO W/SCOPE: CPT

## 2024-10-18 PROCEDURE — 2550000001 HC RX 255 CONTRASTS

## 2024-10-18 PROCEDURE — 36415 COLL VENOUS BLD VENIPUNCTURE: CPT

## 2024-10-18 PROCEDURE — 99284 EMERGENCY DEPT VISIT MOD MDM: CPT | Mod: 25

## 2024-10-18 PROCEDURE — 85027 COMPLETE CBC AUTOMATED: CPT

## 2024-10-18 PROCEDURE — 84075 ASSAY ALKALINE PHOSPHATASE: CPT

## 2024-10-18 RX ORDER — SULFAMETHOXAZOLE AND TRIMETHOPRIM 800; 160 MG/1; MG/1
1 TABLET ORAL 2 TIMES DAILY
Qty: 20 TABLET | Refills: 0 | Status: SHIPPED | OUTPATIENT
Start: 2024-10-18 | End: 2024-10-28

## 2024-10-18 RX ADMIN — IOHEXOL 75 ML: 350 INJECTION, SOLUTION INTRAVENOUS at 13:59

## 2024-10-18 ASSESSMENT — PAIN - FUNCTIONAL ASSESSMENT: PAIN_FUNCTIONAL_ASSESSMENT: 0-10

## 2024-10-18 ASSESSMENT — PAIN SCALES - GENERAL: PAINLEVEL_OUTOF10: 3

## 2024-10-18 ASSESSMENT — PAIN DESCRIPTION - PAIN TYPE: TYPE: ACUTE PAIN

## 2024-10-18 NOTE — Clinical Note
Elliot Earl was seen and treated in our emergency department on 10/18/2024.  He may return to work on 10/19/2024.       If you have any questions or concerns, please don't hesitate to call.      Nataliia Hernandez PA-C

## 2024-10-18 NOTE — ED PROVIDER NOTES
HPI   Chief Complaint   Patient presents with    Difficulty Urinating     Pt to ED for painful urination with discomfort while sitting. Has history of abscess in urethra with surgery about 2 years ago. No blood in urine. Pain and discomfort started yesterday.       HPI  Patient is a 54-year-old male with history of diabetes presenting to ER for evaluation of scrotal discomfort and perineal discomfort that started yesterday.  Patient states the pain increases when he is sitting.  He states he has a history of a urethral abscess and abscesses in the area and is concerned he is developing this infection again.  He states that he required surgery for his urethral abscess.  He denies any blood in the urine.  States that he does have a wife is not concerned for sexually transmitted infection at this time.  Denies fevers, chills, abdominal pain, nausea, vomiting.      Patient History   Past Medical History:   Diagnosis Date    Acute myocardial infarction, unspecified (Multi) 11/11/2020    Acute MI    Complex tear of medial meniscus, current injury, right knee, initial encounter 06/17/2020    Complex tear of medial meniscus of right knee as current injury, initial encounter    Cutaneous abscess of perineum 01/04/2021    Perineal abscess    Diabetes mellitus (Multi)     Effusion, right knee 09/15/2020    Knee effusion, right    Encounter for other specified aftercare 05/04/2021    Visit for wound check    Low back pain, unspecified 06/20/2018    Lumbar pain    Low back pain, unspecified 01/16/2019    Lumbar pain    Other conditions influencing health status 12/17/2020    History of cough    Other specified health status     No pertinent past medical history    Pain in left knee     Left knee pain, unspecified chronicity    Personal history of other diseases of male genital organs 07/09/2020    History of balanitis    Radiculopathy, lumbar region 05/03/2019    Acute lumbar radiculopathy    Urinary tract infection, site not  specified 05/04/2021    Acute urinary tract infection     Past Surgical History:   Procedure Laterality Date    OTHER SURGICAL HISTORY  07/09/2020    Knee surgery    OTHER SURGICAL HISTORY  07/09/2020    Abscess incision and drainage     No family history on file.  Social History     Tobacco Use    Smoking status: Every Day     Current packs/day: 1.00     Average packs/day: 1 pack/day for 20.0 years (20.0 ttl pk-yrs)     Types: Cigarettes    Smokeless tobacco: Not on file   Vaping Use    Vaping status: Never Used   Substance Use Topics    Alcohol use: Yes    Drug use: Not Currently     Types: Marijuana       Physical Exam   ED Triage Vitals [10/18/24 1131]   Temperature Heart Rate Respirations BP   36.7 °C (98.1 °F) 95 16 (!) 170/117      Pulse Ox Temp Source Heart Rate Source Patient Position   98 % Temporal Monitor Sitting      BP Location FiO2 (%)     Left arm --       Physical Exam  Vitals and nursing note reviewed.   Constitutional:       General: He is not in acute distress.     Appearance: He is well-developed.   HENT:      Head: Normocephalic and atraumatic.   Eyes:      Conjunctiva/sclera: Conjunctivae normal.   Cardiovascular:      Rate and Rhythm: Normal rate and regular rhythm.      Heart sounds: No murmur heard.  Pulmonary:      Effort: Pulmonary effort is normal. No respiratory distress.      Breath sounds: Normal breath sounds.   Abdominal:      Palpations: Abdomen is soft.      Tenderness: There is no abdominal tenderness.   Genitourinary:     Penis: Normal.       Testes: Normal.      Comments: Minimal tenderness to palpation of the perineum without obvious skin changes or erythema or warmth.  No testicular swelling.  No penile swelling or lesions.  Musculoskeletal:         General: No swelling.      Cervical back: Neck supple.   Skin:     General: Skin is warm and dry.      Capillary Refill: Capillary refill takes less than 2 seconds.   Neurological:      Mental Status: He is alert.   Psychiatric:          Mood and Affect: Mood normal.           ED Course & MDM   ED Course as of 10/20/24 1038   Fri Oct 18, 2024   1552 Spoke with the urologist on-call Dr. Hernandez regarding patient case.  Feels patient is appropriate for outpatient antibiotic therapy and he can follow-up with him in the office.  I do agree with this assessment as patient is nontoxic-appearing and is voiding appropriately at this time.  Will give Bactrim for treatment.  Return precautions will be discussed. [JJ]      ED Course User Index  [JJ] Nataliia Hernandez PA-C         Diagnoses as of 10/20/24 1038   Urinary tract infection without hematuria, site unspecified   Cellulitis of urethra                 No data recorded     [place] Coma Scale Score: 15 (10/18/24 1131 : Kathy Fowler RN)                           Medical Decision Making  Parts of this chart have been completed using voice recognition software. Please excuse any errors of transcription.  My thought process and reason for plan has been formulated from the time that I saw the patient until the time of disposition and is not specific to one specific moment during their visit and furthermore my MDM encompasses this entire chart and not only this text box.      HPI: Detailed above.    Exam: A medically appropriate exam performed, outlined above, given the known history and presentation.    History obtained from: Patient    EKG: Not warranted    Social Determinants of Health considered during this visit: Lives independently    Medications given during visit:  Medications   iohexol (OMNIPaque) 350 mg iodine/mL solution 75 mL (75 mL intravenous Given 10/18/24 1359)        Diagnostic/tests  Labs Reviewed   CBC WITH AUTO DIFFERENTIAL - Abnormal       Result Value    WBC 6.5      nRBC 0.0      RBC 6.24 (*)     Hemoglobin 16.4      Hematocrit 50.3      MCV 81      MCH 26.3      MCHC 32.6      RDW 15.9 (*)     Platelets 193      Immature Granulocytes %, Automated 0.6      Immature Granulocytes  Absolute, Automated 0.04      Narrative:     The previously reported component Neutrophils % is no longer being reported.  The previously reported component Lymphocytes % is no longer being reported.  The previously reported component Monocytes % is no longer being reported.  The previously   reported component Eosinophils % is no longer being reported.  The previously reported component Basophils % is no longer being reported.  The previously reported component Absolute Neutrophils is no longer being reported.  The previously reported   component Absolute Lymphocytes is no longer being reported.  The previously reported component Absolute Monocytes is no longer being reported.  The previously reported component Absolute Eosinophils is no longer being reported.  The previously reported   component Absolute Basophils is no longer being reported.   COMPREHENSIVE METABOLIC PANEL - Abnormal    Glucose 284 (*)     Sodium 134 (*)     Potassium 4.5      Chloride 102      Bicarbonate 25      Anion Gap 12      Urea Nitrogen 14      Creatinine 0.94      eGFR >90      Calcium 8.7      Albumin 4.4      Alkaline Phosphatase 61      Total Protein 6.9      AST 16      Bilirubin, Total 0.5      ALT 32     URINALYSIS WITH REFLEX CULTURE AND MICROSCOPIC - Abnormal    Color, Urine Light-Yellow      Appearance, Urine Clear      Specific Gravity, Urine 1.030      pH, Urine 5.5      Protein, Urine 20 (TRACE)      Glucose, Urine OVER (4+) (*)     Blood, Urine 0.03 (TRACE) (*)     Ketones, Urine NEGATIVE      Bilirubin, Urine NEGATIVE      Urobilinogen, Urine Normal      Nitrite, Urine NEGATIVE      Leukocyte Esterase, Urine 75 Alysha/µL (*)     Narrative:     OVER is reported when the result is greater than the clinically reportable range.   MICROSCOPIC ONLY, URINE - Abnormal    WBC, Urine 11-20 (*)     RBC, Urine 1-2      Squamous Epithelial Cells, Urine 1-9 (SPARSE)     URINE CULTURE - Normal    Urine Culture No significant growth      URINALYSIS WITH REFLEX CULTURE AND MICROSCOPIC    Narrative:     The following orders were created for panel order Urinalysis with Reflex Culture and Microscopic.  Procedure                               Abnormality         Status                     ---------                               -----------         ------                     Urinalysis with Reflex C...[[id number]]  Abnormal            Final result               Extra Urine Gray Tube[[id number]]                            Final result                 Please view results for these tests on the individual orders.   EXTRA URINE GRAY TUBE    Extra Tube Hold for add-ons.     MANUAL DIFFERENTIAL    Neutrophils %, Manual 64.0      Bands %, Manual 1.0      Lymphocytes %, Manual 24.0      Monocytes %, Manual 9.0      Eosinophils %, Manual 0.0      Basophils %, Manual 0.0      Atypical Lymphocytes %, Manual 1.0      Metamyelocytes %, Manual 1.0      Seg Neutrophils Absolute, Manual 4.16      Bands Absolute, Manual 0.07      Lymphocytes Absolute, Manual 1.56      Monocytes Absolute, Manual 0.59      Eosinophils Absolute, Manual 0.00      Basophils Absolute, Manual 0.00      Atypical Lymphs Absolute, Manual 0.07      Metamyelocytes Absolute, Manual 0.07      Total Cells Counted 100      Neutrophils Absolute, Manual 4.23      RBC Morphology No significant RBC morphology present        CT abdomen pelvis w IV contrast   Final Result   There is interval development of a 15 mm diameter fluid density   structure in the bulb are part of the corpus spongiosum to the left   of midline, where previously there had been a thin left-sided   elongated fluid collection. This could be a periurethral corpora   spongiosum cyst. A small abscess is not entirely excluded. Clinical   correlation is needed.        Tiny hydrocele in the right hemiscrotum.        Mild nonspecific prostate enlargement.        Nonspecific decreased density in the liver, most likely fatty   infiltration.         Mild-to-moderate diffuse retained colonic stool.        MACRO:   None        Signed by: Perry Prieto 10/18/2024 3:33 PM   Dictation workstation:   [id number]           Considerations/further MDM:  Patient is a 54-year-old male presenting for evaluation of perineal pain    Patient is nontoxic-appearing on exam during the visit.  He is in no acute distress.  Exam of the perineum and penis reveals no evidence of skin changes, overt abscess, erythema or warmth.  No evidence of Kika's gangrene on exam.  There is minimal tenderness to palpation of the perineal area.  Laboratory workup is without leukocytosis, acute kidney injury or electrolyte abnormality.  Urinalysis is with white blood cells and leukocyte esterase, possibly indicative of urinary tract infection.  CT abdomen pelvis is performed with evidence of a 15 mm fluid density in the bulb of the corpus spongiosum but could be concerning for the development of a small abscess.  The patient is able to void without difficulty during the visit.  I did discuss the CT findings with the urologist on-call as discussed in the ED course.  Patient is released with prescription for Bactrim to treat the abscess.  Strict return precautions are discussed with the patient.  He is agreeable with this plan of care.  He is also instructed to follow-up with Dr. Cates in the office early next week for reevaluation.  States his understanding of the follow-up plan.  Released in good condition.      Procedure  Procedures     Nataliia Hernandez PA-C  10/20/24 1039

## 2024-10-18 NOTE — DISCHARGE INSTRUCTIONS
Please take Bactrim twice daily for 10 days for treatment of possible abscess formation in your urethra and follow-up with the urologist provided within 1 week for further evaluation of symptoms.  Please present back to ER if you develop any high fevers, chills, increased pain, nausea, vomiting or difficulty urinating despite treatment.    It is important to remember that your care does not end here and you must continue to monitor your condition closely. Please return to the emergency department for any worsening or concerning signs or symptoms as directed by our conversations and the discharge instructions. If you do not have a doctor please contact the referral number on your discharge instructions. Please contact any physician specialists provided in your discharge notes as it is very important to follow up with them regarding your condition. If you are unable to reach the physicians provided, please come back to the Emergency Department at any time.

## 2024-10-18 NOTE — ED TRIAGE NOTES
Pt to ED for painful urination with discomfort while sitting. Has history of abscess in urethra with surgery about 2 years ago. No blood in urine. Pain and discomfort started yesterday.

## 2024-10-20 LAB — BACTERIA UR CULT: NORMAL

## 2024-10-22 ENCOUNTER — HOSPITAL ENCOUNTER (EMERGENCY)
Facility: HOSPITAL | Age: 54
Discharge: HOME | End: 2024-10-22

## 2024-10-22 ENCOUNTER — APPOINTMENT (OUTPATIENT)
Dept: RADIOLOGY | Facility: HOSPITAL | Age: 54
End: 2024-10-22

## 2024-10-22 VITALS
DIASTOLIC BLOOD PRESSURE: 99 MMHG | HEART RATE: 90 BPM | TEMPERATURE: 96.8 F | BODY MASS INDEX: 43.16 KG/M2 | SYSTOLIC BLOOD PRESSURE: 153 MMHG | HEIGHT: 67 IN | RESPIRATION RATE: 16 BRPM | WEIGHT: 275 LBS | OXYGEN SATURATION: 97 %

## 2024-10-22 DIAGNOSIS — N48.21 PENILE ABSCESS: Primary | ICD-10-CM

## 2024-10-22 LAB
ALBUMIN SERPL BCP-MCNC: 4.2 G/DL (ref 3.4–5)
ALP SERPL-CCNC: 71 U/L (ref 33–120)
ALT SERPL W P-5'-P-CCNC: 18 U/L (ref 10–52)
ANION GAP SERPL CALCULATED.3IONS-SCNC: 11 MMOL/L (ref 10–20)
APPEARANCE UR: CLEAR
AST SERPL W P-5'-P-CCNC: 11 U/L (ref 9–39)
BASOPHILS # BLD MANUAL: 0.2 X10*3/UL (ref 0–0.1)
BASOPHILS NFR BLD MANUAL: 2 %
BILIRUB SERPL-MCNC: 0.5 MG/DL (ref 0–1.2)
BILIRUB UR STRIP.AUTO-MCNC: NEGATIVE MG/DL
BUN SERPL-MCNC: 13 MG/DL (ref 6–23)
BURR CELLS BLD QL SMEAR: ABNORMAL
CALCIUM SERPL-MCNC: 9.8 MG/DL (ref 8.6–10.3)
CHLORIDE SERPL-SCNC: 100 MMOL/L (ref 98–107)
CO2 SERPL-SCNC: 26 MMOL/L (ref 21–32)
COLOR UR: ABNORMAL
CREAT SERPL-MCNC: 1.05 MG/DL (ref 0.5–1.3)
EGFRCR SERPLBLD CKD-EPI 2021: 85 ML/MIN/1.73M*2
EOSINOPHIL # BLD MANUAL: 0.1 X10*3/UL (ref 0–0.7)
EOSINOPHIL NFR BLD MANUAL: 1 %
ERYTHROCYTE [DISTWIDTH] IN BLOOD BY AUTOMATED COUNT: 15.7 % (ref 11.5–14.5)
GLUCOSE SERPL-MCNC: 314 MG/DL (ref 74–99)
GLUCOSE UR STRIP.AUTO-MCNC: ABNORMAL MG/DL
HCT VFR BLD AUTO: 51.3 % (ref 41–52)
HGB BLD-MCNC: 16.5 G/DL (ref 13.5–17.5)
IMM GRANULOCYTES # BLD AUTO: 0.05 X10*3/UL (ref 0–0.7)
IMM GRANULOCYTES NFR BLD AUTO: 0.5 % (ref 0–0.9)
KETONES UR STRIP.AUTO-MCNC: ABNORMAL MG/DL
LACTATE SERPL-SCNC: 1.4 MMOL/L (ref 0.4–2)
LEUKOCYTE ESTERASE UR QL STRIP.AUTO: ABNORMAL
LYMPHOCYTES # BLD MANUAL: 1.94 X10*3/UL (ref 1.2–4.8)
LYMPHOCYTES NFR BLD MANUAL: 19 %
MCH RBC QN AUTO: 26.1 PG (ref 26–34)
MCHC RBC AUTO-ENTMCNC: 32.2 G/DL (ref 32–36)
MCV RBC AUTO: 81 FL (ref 80–100)
MONOCYTES # BLD MANUAL: 0.92 X10*3/UL (ref 0.1–1)
MONOCYTES NFR BLD MANUAL: 9 %
NEUTS SEG # BLD MANUAL: 7.04 X10*3/UL (ref 1.2–7)
NEUTS SEG NFR BLD MANUAL: 69 %
NITRITE UR QL STRIP.AUTO: NEGATIVE
NRBC BLD-RTO: 0 /100 WBCS (ref 0–0)
PH UR STRIP.AUTO: 6 [PH]
PLATELET # BLD AUTO: 212 X10*3/UL (ref 150–450)
POTASSIUM SERPL-SCNC: 4.4 MMOL/L (ref 3.5–5.3)
PROT SERPL-MCNC: 7.4 G/DL (ref 6.4–8.2)
PROT UR STRIP.AUTO-MCNC: ABNORMAL MG/DL
RBC # BLD AUTO: 6.33 X10*6/UL (ref 4.5–5.9)
RBC # UR STRIP.AUTO: ABNORMAL /UL
RBC #/AREA URNS AUTO: ABNORMAL /HPF
RBC MORPH BLD: ABNORMAL
SODIUM SERPL-SCNC: 133 MMOL/L (ref 136–145)
SP GR UR STRIP.AUTO: 1.03
SQUAMOUS #/AREA URNS AUTO: ABNORMAL /HPF
TOTAL CELLS COUNTED BLD: 100
UROBILINOGEN UR STRIP.AUTO-MCNC: NORMAL MG/DL
WBC # BLD AUTO: 10.2 X10*3/UL (ref 4.4–11.3)
WBC #/AREA URNS AUTO: ABNORMAL /HPF

## 2024-10-22 PROCEDURE — 74177 CT ABD & PELVIS W/CONTRAST: CPT

## 2024-10-22 PROCEDURE — 83605 ASSAY OF LACTIC ACID: CPT | Performed by: PHYSICIAN ASSISTANT

## 2024-10-22 PROCEDURE — 81001 URINALYSIS AUTO W/SCOPE: CPT | Performed by: PHYSICIAN ASSISTANT

## 2024-10-22 PROCEDURE — 2550000001 HC RX 255 CONTRASTS: Performed by: PHYSICIAN ASSISTANT

## 2024-10-22 PROCEDURE — 85027 COMPLETE CBC AUTOMATED: CPT | Performed by: PHYSICIAN ASSISTANT

## 2024-10-22 PROCEDURE — 74177 CT ABD & PELVIS W/CONTRAST: CPT | Performed by: RADIOLOGY

## 2024-10-22 PROCEDURE — 85007 BL SMEAR W/DIFF WBC COUNT: CPT | Performed by: PHYSICIAN ASSISTANT

## 2024-10-22 PROCEDURE — 36415 COLL VENOUS BLD VENIPUNCTURE: CPT | Performed by: PHYSICIAN ASSISTANT

## 2024-10-22 PROCEDURE — 87086 URINE CULTURE/COLONY COUNT: CPT | Mod: WESLAB | Performed by: PHYSICIAN ASSISTANT

## 2024-10-22 PROCEDURE — 80053 COMPREHEN METABOLIC PANEL: CPT | Performed by: PHYSICIAN ASSISTANT

## 2024-10-22 PROCEDURE — 99284 EMERGENCY DEPT VISIT MOD MDM: CPT | Mod: 25

## 2024-10-22 RX ORDER — CEPHALEXIN 500 MG/1
500 CAPSULE ORAL 3 TIMES DAILY
Qty: 30 CAPSULE | Refills: 0 | Status: SHIPPED | OUTPATIENT
Start: 2024-10-22 | End: 2024-11-01

## 2024-10-22 ASSESSMENT — COLUMBIA-SUICIDE SEVERITY RATING SCALE - C-SSRS
6. HAVE YOU EVER DONE ANYTHING, STARTED TO DO ANYTHING, OR PREPARED TO DO ANYTHING TO END YOUR LIFE?: NO
1. IN THE PAST MONTH, HAVE YOU WISHED YOU WERE DEAD OR WISHED YOU COULD GO TO SLEEP AND NOT WAKE UP?: NO
2. HAVE YOU ACTUALLY HAD ANY THOUGHTS OF KILLING YOURSELF?: NO

## 2024-10-22 ASSESSMENT — PAIN - FUNCTIONAL ASSESSMENT: PAIN_FUNCTIONAL_ASSESSMENT: 0-10

## 2024-10-22 ASSESSMENT — PAIN SCALES - GENERAL
PAINLEVEL_OUTOF10: 0 - NO PAIN
PAINLEVEL_OUTOF10: 4

## 2024-10-22 NOTE — DISCHARGE INSTRUCTIONS
Continue taking the Bactrim that you were provided with a couple days ago, start taking the Keflex that I am giving you today    Please call first thing in the morning and follow-up with urology as we have discussed.      Be sure to follow up as directed in 1-2 days.  All of the details of your follow up instructions are detailed in the follow up section of this packet.         It is important to remember that your care does not end here and you must continue to monitor your condition closely. Please return to the emergency department for any worsening or concerning signs or symptoms as directed by our conversations and the discharge instructions. Otherwise please follow up with your doctor in 2 days if no better or worse. If you do not have a doctor please contact the referral number on your discharge instructions. Please contact any physician specialists provided in your discharge notes as it is very important to follow up with them regarding your condition. If you are unable to reach the physicians provided, please come back to the Emergency Department at any time.        Return to emergency room without delay for ANY new or worsening pains or for any other symptoms or concerns.

## 2024-10-22 NOTE — ED PROVIDER NOTES
HPI   Chief Complaint   Patient presents with    Urinary Frequency       HPI  54-year-old male here for evaluation of possible penile abscess.  The patient states that he was seen here recently and started on antibiotics, last night he had a large amount of purulent puslike material expelled from his meatus.  Says that now he is having significant burning with any urine passage.  Still on antibiotics.  But the pain became much worse and wanted to come in for assessment.      Patient History   Past Medical History:   Diagnosis Date    Acute myocardial infarction, unspecified (Multi) 11/11/2020    Acute MI    Complex tear of medial meniscus, current injury, right knee, initial encounter 06/17/2020    Complex tear of medial meniscus of right knee as current injury, initial encounter    Cutaneous abscess of perineum 01/04/2021    Perineal abscess    Diabetes mellitus (Multi)     Effusion, right knee 09/15/2020    Knee effusion, right    Encounter for other specified aftercare 05/04/2021    Visit for wound check    Low back pain, unspecified 06/20/2018    Lumbar pain    Low back pain, unspecified 01/16/2019    Lumbar pain    Other conditions influencing health status 12/17/2020    History of cough    Other specified health status     No pertinent past medical history    Pain in left knee     Left knee pain, unspecified chronicity    Personal history of other diseases of male genital organs 07/09/2020    History of balanitis    Radiculopathy, lumbar region 05/03/2019    Acute lumbar radiculopathy    Urinary tract infection, site not specified 05/04/2021    Acute urinary tract infection     Past Surgical History:   Procedure Laterality Date    OTHER SURGICAL HISTORY  07/09/2020    Knee surgery    OTHER SURGICAL HISTORY  07/09/2020    Abscess incision and drainage     No family history on file.  Social History     Tobacco Use    Smoking status: Every Day     Current packs/day: 1.00     Average packs/day: 1 pack/day for 20.0  years (20.0 ttl pk-yrs)     Types: Cigarettes    Smokeless tobacco: Not on file   Vaping Use    Vaping status: Never Used   Substance Use Topics    Alcohol use: Yes    Drug use: Not Currently     Types: Marijuana       Physical Exam   ED Triage Vitals [10/22/24 1150]   Temperature Heart Rate Respirations BP   36 °C (96.8 °F) (!) 101 15 (!) 143/101      Pulse Ox Temp Source Heart Rate Source Patient Position   100 % Oral Monitor --      BP Location FiO2 (%)     -- --       Physical Exam  PHYSICAL EXAMINATION    GENERAL APPEARANCE: Awake and alert.     VITAL SIGNS: As per the nurses' triage record.     HEENT: Normocephalic, atraumatic. Extraocular muscles are intact. Pupils equal round and reactive to light. Conjunctiva are pink. Negative scleral icterus. Mucous membranes are moist. Tongue in the midline. Pharynx was without erythema or exudates, uvula midline    NECK: Soft Nontender and supple, full gross ROM, no meningeal signs.    CHEST: Nontender to palpation. Clear to auscultation bilaterally. No rales, rhonchi, or wheezing.     HEART: S1, S2. Regular rate and rhythm. No murmurs, gallops or rubs.  Strong and equal pulses in the extremities.     Genitourinary-examined.  Circumcised male, no obvious discharge at this time, no palpable mass or obvious deformity.  Intact pulses and cremasteric reflex      ABDOMEN: Soft, nontender, nondistended, positive bowel sounds, no palpable masses.    MUSCULOSKELETAL: The calves are nontender to palpation. Full gross active range of motion. Ambulating on own with no acute difficulties     NEUROLOGICAL: Awake, alert and oriented x 3. Power intact in the upper and lower extremities. Sensation is intact to light touch in the upper and lower extremities.     IMMUNOLOGICAL: No lymphatic streaking noted     DERM: No petechiae, rashes, or ecchymoses.    ED Course & MDM   ED Course as of 10/22/24 1530   Tue Oct 22, 2024   1259 GLUCOSE(!): 314  Seems to always run on the high side,  slightly higher than last several measures. [AP]   1517 Spoke to Dr. Spence of urology.  Discussed the case.  He remotely viewed the images and also reviewed the patient's chart.  He stated that with the imaging and the diagnostics he would recommend that we put the patient on Bactrim and have him follow-up on an outpatient basis.  He states that he does not feel patient requires hospitalization. [AP]      ED Course User Index  [AP] Krishna Colunga, JORGE         Diagnoses as of 10/22/24 1530   Penile abscess                 No data recorded     [place] Coma Scale Score: 15 (10/22/24 1146 : Keke Villalta, ALONA)                           Medical Decision Making  Parts of this chart have been completed using voice recognition software. Please excuse any errors of transcription.  My thought process and reason for plan has been formulated from the time that I saw the patient until the time of disposition and is not specific to one specific moment during their visit and furthermore my MDM encompasses this entire chart and not only this text box.      HPI: Detailed above.    Exam: A medically appropriate exam performed, outlined above, given the known history and presentation.    History Limited by: Nothing    History obtained from: The patient    External/internal records reviewed: Reviewed ED record as well as CT imaging from 10/18/2024    Social Determinants of Health considered during this visit: Lives at home    Chronic conditions impacting care: Denies    Medications given during visit:  Medications   iohexol (OMNIPaque) 350 mg iodine/mL solution 75 mL (75 mL intravenous Given 10/22/24 1336)        Diagnostic/tests  Labs Reviewed   CBC WITH AUTO DIFFERENTIAL - Abnormal       Result Value    WBC 10.2      nRBC 0.0      RBC 6.33 (*)     Hemoglobin 16.5      Hematocrit 51.3      MCV 81      MCH 26.1      MCHC 32.2      RDW 15.7 (*)     Platelets 212      Immature Granulocytes %, Automated 0.5      Immature  Granulocytes Absolute, Automated 0.05      Narrative:     The previously reported component Neutrophils % is no longer being reported.  The previously reported component Lymphocytes % is no longer being reported.  The previously reported component Monocytes % is no longer being reported.  The previously   reported component Eosinophils % is no longer being reported.  The previously reported component Basophils % is no longer being reported.  The previously reported component Absolute Neutrophils is no longer being reported.  The previously reported   component Absolute Lymphocytes is no longer being reported.  The previously reported component Absolute Monocytes is no longer being reported.  The previously reported component Absolute Eosinophils is no longer being reported.  The previously reported   component Absolute Basophils is no longer being reported.   COMPREHENSIVE METABOLIC PANEL - Abnormal    Glucose 314 (*)     Sodium 133 (*)     Potassium 4.4      Chloride 100      Bicarbonate 26      Anion Gap 11      Urea Nitrogen 13      Creatinine 1.05      eGFR 85      Calcium 9.8      Albumin 4.2      Alkaline Phosphatase 71      Total Protein 7.4      AST 11      Bilirubin, Total 0.5      ALT 18     URINALYSIS WITH REFLEX CULTURE AND MICROSCOPIC - Abnormal    Color, Urine Light-Yellow      Appearance, Urine Clear      Specific Gravity, Urine 1.030      pH, Urine 6.0      Protein, Urine 10 (TRACE)      Glucose, Urine OVER (4+) (*)     Blood, Urine 0.06 (1+) (*)     Ketones, Urine TRACE (*)     Bilirubin, Urine NEGATIVE      Urobilinogen, Urine Normal      Nitrite, Urine NEGATIVE      Leukocyte Esterase, Urine 25 Alysha/µL (*)     Narrative:     OVER is reported when the result is greater than the clinically reportable range.   MICROSCOPIC ONLY, URINE - Abnormal    WBC, Urine 6-10 (*)     RBC, Urine 6-10 (*)     Squamous Epithelial Cells, Urine 1-9 (SPARSE)     MANUAL DIFFERENTIAL - Abnormal    Neutrophils %, Manual  69.0      Lymphocytes %, Manual 19.0      Monocytes %, Manual 9.0      Eosinophils %, Manual 1.0      Basophils %, Manual 2.0      Seg Neutrophils Absolute, Manual 7.04 (*)     Lymphocytes Absolute, Manual 1.94      Monocytes Absolute, Manual 0.92      Eosinophils Absolute, Manual 0.10      Basophils Absolute, Manual 0.20 (*)     Total Cells Counted 100      RBC Morphology See Below      Cochran Cells Few     LACTATE - Normal    Lactate 1.4      Narrative:     Venipuncture immediately after or during the administration of Metamizole may lead to falsely low results. Testing should be performed immediately prior to Metamizole dosing.   URINE CULTURE   URINALYSIS WITH REFLEX CULTURE AND MICROSCOPIC    Narrative:     The following orders were created for panel order Urinalysis with Reflex Culture and Microscopic.  Procedure                               Abnormality         Status                     ---------                               -----------         ------                     Urinalysis with Reflex C...[[id number]]  Abnormal            Final result               Extra Urine Gray Tube[[id number]]                                                         Please view results for these tests on the individual orders.   EXTRA URINE GRAY TUBE      CT abdomen pelvis w IV contrast   Final Result   1. The somewhat rounded fluid collection along the left side of the   penile base has regressed with a residual thin slit-like fluid   collection present currently, unchanged from the study from   11/09/2023. This measures 13 x 3 mm. (Series 9, Image 73)   2. Small right hydrocele.   3. Sigmoid diverticulosis.             MACRO:   none        Signed by: Jeffrey Marley 10/22/2024 2:29 PM   Dictation workstation:   [id number]           Diagnostic tests considered but not performed: Considered the potential need of a urethrogram however not needed based on discussion with urologist    Case discussed with: Dr. Spence of  urology    Prescription medications considered: Urology recommended Bactrim, this was started 2 to 3 days ago.  Based on the findings I will also add Keflex    Considerations/further MDM:  Differential includes abscess, cellulitis, urethral injury or tear, patient has no pain around the scrotum or testes.  Patient does not have any visible abnormalities appreciated, recommend close return precautions follow-up instructions, and he has seen urology for this in the past, recommend that he follow-up with them as recommended.      Procedure  Procedures     Krishna Colunga PA-C  10/22/24 9395

## 2024-10-22 NOTE — ED TRIAGE NOTES
"Pt here on 10/18 for UTI and urethral abscess, states it \"burst\" around 2230 10/21, had yellow drainage with foul smell suddenly from his urethra, has been having hematuria and burning since. Denies fevers or abd pain. Has had to have surgeries for urethral abscess in the past, 2-3 years ago.  "

## 2024-10-23 LAB — BACTERIA UR CULT: NORMAL

## 2024-11-20 ENCOUNTER — HOSPITAL ENCOUNTER (EMERGENCY)
Facility: HOSPITAL | Age: 54
Discharge: HOME | End: 2024-11-20
Payer: MEDICAID

## 2024-11-20 VITALS
HEIGHT: 67 IN | SYSTOLIC BLOOD PRESSURE: 172 MMHG | RESPIRATION RATE: 18 BRPM | TEMPERATURE: 97 F | BODY MASS INDEX: 43.95 KG/M2 | HEART RATE: 93 BPM | WEIGHT: 280 LBS | DIASTOLIC BLOOD PRESSURE: 96 MMHG | OXYGEN SATURATION: 97 %

## 2024-11-20 DIAGNOSIS — H04.129 DRY EYE: Primary | ICD-10-CM

## 2024-11-20 DIAGNOSIS — H01.006 BLEPHARITIS OF BOTH EYES, UNSPECIFIED EYELID, UNSPECIFIED TYPE: ICD-10-CM

## 2024-11-20 DIAGNOSIS — H01.003 BLEPHARITIS OF BOTH EYES, UNSPECIFIED EYELID, UNSPECIFIED TYPE: ICD-10-CM

## 2024-11-20 PROCEDURE — 99283 EMERGENCY DEPT VISIT LOW MDM: CPT

## 2024-11-20 RX ORDER — ERYTHROMYCIN 5 MG/G
OINTMENT OPHTHALMIC NIGHTLY
Qty: 1 G | Refills: 0 | Status: SHIPPED | OUTPATIENT
Start: 2024-11-20 | End: 2024-11-30

## 2024-11-20 ASSESSMENT — COLUMBIA-SUICIDE SEVERITY RATING SCALE - C-SSRS
6. HAVE YOU EVER DONE ANYTHING, STARTED TO DO ANYTHING, OR PREPARED TO DO ANYTHING TO END YOUR LIFE?: NO
2. HAVE YOU ACTUALLY HAD ANY THOUGHTS OF KILLING YOURSELF?: NO
1. IN THE PAST MONTH, HAVE YOU WISHED YOU WERE DEAD OR WISHED YOU COULD GO TO SLEEP AND NOT WAKE UP?: NO

## 2024-11-20 NOTE — ED PROVIDER NOTES
HPI   Chief Complaint   Patient presents with    Watery Eyes       54-year-old male presented emergency department with a chief complaint of redness and intermittent watering of his eyes bilaterally.  He is not having any pain.  He denies any visual complaints.  This is been an ongoing issue for him.  He denies infectious symptoms.  He denies lightheadedness dizziness numbness weakness.  Well-appearing nontoxic afebrile.  No other complaint.              Patient History   Past Medical History:   Diagnosis Date    Acute myocardial infarction, unspecified (Multi) 11/11/2020    Acute MI    Complex tear of medial meniscus, current injury, right knee, initial encounter 06/17/2020    Complex tear of medial meniscus of right knee as current injury, initial encounter    Cutaneous abscess of perineum 01/04/2021    Perineal abscess    Diabetes mellitus (Multi)     Effusion, right knee 09/15/2020    Knee effusion, right    Encounter for other specified aftercare 05/04/2021    Visit for wound check    Low back pain, unspecified 06/20/2018    Lumbar pain    Low back pain, unspecified 01/16/2019    Lumbar pain    Other conditions influencing health status 12/17/2020    History of cough    Other specified health status     No pertinent past medical history    Pain in left knee     Left knee pain, unspecified chronicity    Personal history of other diseases of male genital organs 07/09/2020    History of balanitis    Radiculopathy, lumbar region 05/03/2019    Acute lumbar radiculopathy    Urinary tract infection, site not specified 05/04/2021    Acute urinary tract infection     Past Surgical History:   Procedure Laterality Date    OTHER SURGICAL HISTORY  07/09/2020    Knee surgery    OTHER SURGICAL HISTORY  07/09/2020    Abscess incision and drainage     No family history on file.  Social History     Tobacco Use    Smoking status: Every Day     Current packs/day: 1.00     Average packs/day: 1 pack/day for 20.0 years (20.0 ttl  pk-yrs)     Types: Cigarettes    Smokeless tobacco: Not on file   Vaping Use    Vaping status: Never Used   Substance Use Topics    Alcohol use: Yes    Drug use: Not Currently     Types: Marijuana       Physical Exam   ED Triage Vitals [11/20/24 1351]   Temperature Heart Rate Respirations BP   36.1 °C (97 °F) 93 18 (!) 172/96      Pulse Ox Temp Source Heart Rate Source Patient Position   97 % Temporal Monitor;Brachial Sitting      BP Location FiO2 (%)     Left arm --       Physical Exam  Vitals and nursing note reviewed.   Constitutional:       Appearance: Normal appearance.   HENT:      Head: Normocephalic.   Eyes:      Comments: Exam concerning for blepharitis, minimal conjunctival injection bilaterally, no pain with extraocular movements, pupils equal round reactive to light.   Cardiovascular:      Rate and Rhythm: Normal rate.      Pulses: Normal pulses.   Pulmonary:      Effort: Pulmonary effort is normal.   Abdominal:      General: Abdomen is flat.   Musculoskeletal:         General: Normal range of motion.      Cervical back: Normal range of motion.   Skin:     General: Skin is warm.   Neurological:      General: No focal deficit present.      Mental Status: He is alert and oriented to person, place, and time.   Psychiatric:         Mood and Affect: Mood normal.           ED Course & MDM   Diagnoses as of 11/20/24 1453   Dry eye   Blepharitis of both eyes, unspecified eyelid, unspecified type                 No data recorded     [place] Coma Scale Score: 15 (11/20/24 1401 : Carine Lozada RN)                           Medical Decision Making  I have seen and evaluated this patient.  Physician available for consultation.  Vital signs have been reviewed.  All laboratory and diagnostic imaging is reviewed by myself and interpreted by myself unless otherwise stated.  Additionally imaging is interpreted by radiologist.    Blepharitis, dry.  Given lubricating drops ophthalmology follow-up.  Released in stable  condition from emergent standpoint.    Labs Reviewed - No data to display  No orders to display  Medications - No data to display  Discharge Medication List as of 11/20/2024  2:55 PM    START taking these medications    dextran 70-hypromellose (Bion Tears) 0.1-0.3 % ophthalmic solution  Administer 1 drop into both eyes 3 times a day as needed for dry eyes., Starting Wed 11/20/2024, Until Thu 11/20/2025 at 2359, Normal    erythromycin (Romycin) 5 mg/gram (0.5 %) ophthalmic ointment  Apply to both eyes once daily at bedtime for 10 days. Apply Amount per Dose: 0.5 inch (~1 cm) per dose., Starting Wed 11/20/2024, Until Sat 11/30/2024, Normal                  Procedure  Procedures     Asif Dash PA-C  11/21/24 2200       Asif Dash PA-C  11/25/24 2645

## 2024-11-20 NOTE — ED NOTES
Visual acuity    Patient usually wears glasses for sight in distance but did not bring them for this visit.     Both eyes 20/40  Left eye 20/30  Right eye 20/50    Patient denies any visual disturbances or pain in the eyes at this time, just being watery.        Hilda Burrell, EMT  11/20/24 6367       Hilda Burrell, EMT  11/20/24 8117

## 2024-12-03 ENCOUNTER — HOSPITAL ENCOUNTER (EMERGENCY)
Facility: HOSPITAL | Age: 54
Discharge: HOME | End: 2024-12-03
Attending: EMERGENCY MEDICINE
Payer: MEDICAID

## 2024-12-03 VITALS
OXYGEN SATURATION: 95 % | RESPIRATION RATE: 16 BRPM | SYSTOLIC BLOOD PRESSURE: 176 MMHG | BODY MASS INDEX: 43.95 KG/M2 | WEIGHT: 280 LBS | HEART RATE: 84 BPM | DIASTOLIC BLOOD PRESSURE: 112 MMHG | HEIGHT: 67 IN | TEMPERATURE: 98.4 F

## 2024-12-03 DIAGNOSIS — R73.9 HYPERGLYCEMIA: ICD-10-CM

## 2024-12-03 DIAGNOSIS — N34.1 URETHRITIS, NONSPECIFIC: Primary | ICD-10-CM

## 2024-12-03 LAB
ALBUMIN SERPL BCP-MCNC: 4.1 G/DL (ref 3.4–5)
ALP SERPL-CCNC: 64 U/L (ref 33–120)
ALT SERPL W P-5'-P-CCNC: 24 U/L (ref 10–52)
ANION GAP SERPL CALCULATED.3IONS-SCNC: 12 MMOL/L (ref 10–20)
APPEARANCE UR: CLEAR
AST SERPL W P-5'-P-CCNC: 12 U/L (ref 9–39)
BASOPHILS # BLD AUTO: 0.07 X10*3/UL (ref 0–0.1)
BASOPHILS NFR BLD AUTO: 0.9 %
BILIRUB SERPL-MCNC: 0.6 MG/DL (ref 0–1.2)
BILIRUB UR STRIP.AUTO-MCNC: NEGATIVE MG/DL
BUN SERPL-MCNC: 12 MG/DL (ref 6–23)
CALCIUM SERPL-MCNC: 9.2 MG/DL (ref 8.6–10.3)
CHLORIDE SERPL-SCNC: 97 MMOL/L (ref 98–107)
CO2 SERPL-SCNC: 28 MMOL/L (ref 21–32)
COLOR UR: COLORLESS
CREAT SERPL-MCNC: 0.84 MG/DL (ref 0.5–1.3)
EGFRCR SERPLBLD CKD-EPI 2021: >90 ML/MIN/1.73M*2
EOSINOPHIL # BLD AUTO: 0.07 X10*3/UL (ref 0–0.7)
EOSINOPHIL NFR BLD AUTO: 0.9 %
ERYTHROCYTE [DISTWIDTH] IN BLOOD BY AUTOMATED COUNT: 15.6 % (ref 11.5–14.5)
GLUCOSE SERPL-MCNC: 415 MG/DL (ref 74–99)
GLUCOSE UR STRIP.AUTO-MCNC: ABNORMAL MG/DL
HCT VFR BLD AUTO: 47.7 % (ref 41–52)
HGB BLD-MCNC: 15.5 G/DL (ref 13.5–17.5)
HOLD SPECIMEN: NORMAL
IMM GRANULOCYTES # BLD AUTO: 0.05 X10*3/UL (ref 0–0.7)
IMM GRANULOCYTES NFR BLD AUTO: 0.6 % (ref 0–0.9)
KETONES UR STRIP.AUTO-MCNC: ABNORMAL MG/DL
LEUKOCYTE ESTERASE UR QL STRIP.AUTO: NEGATIVE
LYMPHOCYTES # BLD AUTO: 1.92 X10*3/UL (ref 1.2–4.8)
LYMPHOCYTES NFR BLD AUTO: 24.6 %
MCH RBC QN AUTO: 26.4 PG (ref 26–34)
MCHC RBC AUTO-ENTMCNC: 32.5 G/DL (ref 32–36)
MCV RBC AUTO: 81 FL (ref 80–100)
MONOCYTES # BLD AUTO: 0.65 X10*3/UL (ref 0.1–1)
MONOCYTES NFR BLD AUTO: 8.3 %
NEUTROPHILS # BLD AUTO: 5.05 X10*3/UL (ref 1.2–7.7)
NEUTROPHILS NFR BLD AUTO: 64.7 %
NITRITE UR QL STRIP.AUTO: NEGATIVE
NRBC BLD-RTO: 0 /100 WBCS (ref 0–0)
PH UR STRIP.AUTO: 5.5 [PH]
PLATELET # BLD AUTO: 180 X10*3/UL (ref 150–450)
POTASSIUM SERPL-SCNC: 4 MMOL/L (ref 3.5–5.3)
PROT SERPL-MCNC: 6.8 G/DL (ref 6.4–8.2)
PROT UR STRIP.AUTO-MCNC: NEGATIVE MG/DL
RBC # BLD AUTO: 5.87 X10*6/UL (ref 4.5–5.9)
RBC # UR STRIP.AUTO: ABNORMAL /UL
RBC #/AREA URNS AUTO: NORMAL /HPF
SODIUM SERPL-SCNC: 133 MMOL/L (ref 136–145)
SP GR UR STRIP.AUTO: 1.04
SQUAMOUS #/AREA URNS AUTO: NORMAL /HPF
UROBILINOGEN UR STRIP.AUTO-MCNC: NORMAL MG/DL
WBC # BLD AUTO: 7.8 X10*3/UL (ref 4.4–11.3)
WBC #/AREA URNS AUTO: NORMAL /HPF

## 2024-12-03 PROCEDURE — 87591 N.GONORRHOEAE DNA AMP PROB: CPT | Mod: WESLAB | Performed by: PHYSICIAN ASSISTANT

## 2024-12-03 PROCEDURE — 96374 THER/PROPH/DIAG INJ IV PUSH: CPT

## 2024-12-03 PROCEDURE — 87491 CHLMYD TRACH DNA AMP PROBE: CPT | Mod: WESLAB | Performed by: PHYSICIAN ASSISTANT

## 2024-12-03 PROCEDURE — 85025 COMPLETE CBC W/AUTO DIFF WBC: CPT | Performed by: PHYSICIAN ASSISTANT

## 2024-12-03 PROCEDURE — 81003 URINALYSIS AUTO W/O SCOPE: CPT | Performed by: PHYSICIAN ASSISTANT

## 2024-12-03 PROCEDURE — 2500000004 HC RX 250 GENERAL PHARMACY W/ HCPCS (ALT 636 FOR OP/ED): Performed by: EMERGENCY MEDICINE

## 2024-12-03 PROCEDURE — 2500000004 HC RX 250 GENERAL PHARMACY W/ HCPCS (ALT 636 FOR OP/ED): Performed by: PHYSICIAN ASSISTANT

## 2024-12-03 PROCEDURE — 84075 ASSAY ALKALINE PHOSPHATASE: CPT | Performed by: PHYSICIAN ASSISTANT

## 2024-12-03 PROCEDURE — 2500000001 HC RX 250 WO HCPCS SELF ADMINISTERED DRUGS (ALT 637 FOR MEDICARE OP): Performed by: EMERGENCY MEDICINE

## 2024-12-03 PROCEDURE — 36415 COLL VENOUS BLD VENIPUNCTURE: CPT | Performed by: PHYSICIAN ASSISTANT

## 2024-12-03 PROCEDURE — 99284 EMERGENCY DEPT VISIT MOD MDM: CPT | Performed by: EMERGENCY MEDICINE

## 2024-12-03 PROCEDURE — 96372 THER/PROPH/DIAG INJ SC/IM: CPT | Performed by: EMERGENCY MEDICINE

## 2024-12-03 RX ORDER — AZITHROMYCIN 500 MG/1
1000 TABLET, FILM COATED ORAL ONCE
Status: COMPLETED | OUTPATIENT
Start: 2024-12-03 | End: 2024-12-03

## 2024-12-03 RX ORDER — KETOROLAC TROMETHAMINE 30 MG/ML
15 INJECTION, SOLUTION INTRAMUSCULAR; INTRAVENOUS ONCE
Status: COMPLETED | OUTPATIENT
Start: 2024-12-03 | End: 2024-12-03

## 2024-12-03 RX ORDER — CEFTRIAXONE 500 MG/1
500 INJECTION, POWDER, FOR SOLUTION INTRAMUSCULAR; INTRAVENOUS ONCE
Status: COMPLETED | OUTPATIENT
Start: 2024-12-03 | End: 2024-12-03

## 2024-12-03 ASSESSMENT — PAIN SCALES - GENERAL
PAINLEVEL_OUTOF10: 4
PAINLEVEL_OUTOF10: 0 - NO PAIN
PAINLEVEL_OUTOF10: 4
PAINLEVEL_OUTOF10: 4

## 2024-12-03 NOTE — ED PROVIDER NOTES
HPI   Chief Complaint   Patient presents with    Blood in Urine       HPI  Patient is a 54-year-old male who presents with increased frequency, burning, and blood in urine.  Patient states that the increased frequency started a few days ago and that the burning and blood in urine started yesterday. patient states that he has experienced these symptoms before.  Denies any abdominal pain, back pain, changes with bowel movements.  Denies fever and chills.      Patient History   Past Medical History:   Diagnosis Date    Acute myocardial infarction, unspecified (Multi) 11/11/2020    Acute MI    Complex tear of medial meniscus, current injury, right knee, initial encounter 06/17/2020    Complex tear of medial meniscus of right knee as current injury, initial encounter    Cutaneous abscess of perineum 01/04/2021    Perineal abscess    Diabetes mellitus (Multi)     Effusion, right knee 09/15/2020    Knee effusion, right    Encounter for other specified aftercare 05/04/2021    Visit for wound check    Low back pain, unspecified 06/20/2018    Lumbar pain    Low back pain, unspecified 01/16/2019    Lumbar pain    Other conditions influencing health status 12/17/2020    History of cough    Other specified health status     No pertinent past medical history    Pain in left knee     Left knee pain, unspecified chronicity    Personal history of other diseases of male genital organs 07/09/2020    History of balanitis    Radiculopathy, lumbar region 05/03/2019    Acute lumbar radiculopathy    Urinary tract infection, site not specified 05/04/2021    Acute urinary tract infection     Past Surgical History:   Procedure Laterality Date    OTHER SURGICAL HISTORY  07/09/2020    Knee surgery    OTHER SURGICAL HISTORY  07/09/2020    Abscess incision and drainage     No family history on file.  Social History     Tobacco Use    Smoking status: Every Day     Current packs/day: 1.00     Average packs/day: 1 pack/day for 20.0 years (20.0 ttl  pk-yrs)     Types: Cigarettes    Smokeless tobacco: Not on file   Vaping Use    Vaping status: Never Used   Substance Use Topics    Alcohol use: Yes    Drug use: Not Currently     Types: Marijuana       Physical Exam   ED Triage Vitals [12/03/24 1000]   Temperature Heart Rate Respirations BP   36.9 °C (98.4 °F) 96 19 (!) 168/114      Pulse Ox Temp Source Heart Rate Source Patient Position   97 % Temporal -- Sitting      BP Location FiO2 (%)     Left arm --       Physical Exam  PHYSICAL EXAMINATION    GENERAL APPEARANCE: Awake and alert.     VITAL SIGNS: As per the nurses' triage record.     HEENT: Normocephalic, atraumatic. Extraocular muscles are intact. Pupils equal round and reactive to light. Conjunctiva are pink. Negative scleral icterus. Mucous membranes are moist. Tongue in the midline. Pharynx was without erythema or exudates, uvula midline    NECK: Soft Nontender and supple, full gross ROM, no meningeal signs.    CHEST: Nontender to palpation. Clear to auscultation bilaterally. No rales, rhonchi, or wheezing.     HEART: S1, S2. Regular rate and rhythm. No murmurs, gallops or rubs.  Strong and equal pulses in the extremities.     ABDOMEN: Soft, nontender, nondistended, positive bowel sounds, no palpable masses.    MUSCULOSKELETAL: The calves are nontender to palpation. Full gross active range of motion. Ambulating on own with no acute difficulties     NEUROLOGICAL: Awake, alert and oriented x 3. Power intact in the upper and lower extremities. Sensation is intact to light touch in the upper and lower extremities.     IMMUNOLOGICAL: No lymphatic streaking noted     DERM: No petechiae, rashes, or ecchymoses.    ED Course & MDM   ED Course as of 12/03/24 1255   Tue Dec 03, 2024   1156 Spoke to diabetic educator, states that she will come down and evaluate the patient within the hour. [AP]      ED Course User Index  [AP] Krishna Colunga PA-C         Diagnoses as of 12/03/24 1255   Urethritis, nonspecific    Hyperglycemia                 No data recorded     [place] Coma Scale Score: 15 (12/03/24 1022 : Mikhail Mcdaniel RN)                           Medical Decision Making  Parts of this chart have been completed using voice recognition software. Please excuse any errors of transcription.  My thought process and reason for plan has been formulated from the time that I saw the patient until the time of disposition and is not specific to one specific moment during their visit and furthermore my MDM encompasses this entire chart and not only this text box.      HPI: Detailed above.    Exam: A medically appropriate exam performed, outlined above, given the known history and presentation.    History Limited by: Nothing    History obtained from: Patient    External/internal records reviewed: No external records reviewed    Social Determinants of Health considered during this visit: Lives at home    Chronic conditions impacting care: Denies    Medications given during visit:  Medications   ketorolac (Toradol) injection 15 mg (15 mg intravenous Given 12/3/24 1037)   cefTRIAXone (Rocephin) vial 500 mg (500 mg intramuscular Given 12/3/24 1134)   azithromycin (Zithromax) tablet 1,000 mg (1,000 mg oral Given 12/3/24 1133)        Diagnostic/tests  Labs Reviewed   CBC WITH AUTO DIFFERENTIAL - Abnormal       Result Value    WBC 7.8      nRBC 0.0      RBC 5.87      Hemoglobin 15.5      Hematocrit 47.7      MCV 81      MCH 26.4      MCHC 32.5      RDW 15.6 (*)     Platelets 180      Neutrophils % 64.7      Immature Granulocytes %, Automated 0.6      Lymphocytes % 24.6      Monocytes % 8.3      Eosinophils % 0.9      Basophils % 0.9      Neutrophils Absolute 5.05      Immature Granulocytes Absolute, Automated 0.05      Lymphocytes Absolute 1.92      Monocytes Absolute 0.65      Eosinophils Absolute 0.07      Basophils Absolute 0.07     COMPREHENSIVE METABOLIC PANEL - Abnormal    Glucose 415 (*)     Sodium 133 (*)     Potassium 4.0       Chloride 97 (*)     Bicarbonate 28      Anion Gap 12      Urea Nitrogen 12      Creatinine 0.84      eGFR >90      Calcium 9.2      Albumin 4.1      Alkaline Phosphatase 64      Total Protein 6.8      AST 12      Bilirubin, Total 0.6      ALT 24     URINALYSIS WITH REFLEX CULTURE AND MICROSCOPIC - Abnormal    Color, Urine Colorless (*)     Appearance, Urine Clear      Specific Gravity, Urine 1.037 (*)     pH, Urine 5.5      Protein, Urine NEGATIVE      Glucose, Urine OVER (4+) (*)     Blood, Urine 0.2 (2+) (*)     Ketones, Urine TRACE (*)     Bilirubin, Urine NEGATIVE      Urobilinogen, Urine Normal      Nitrite, Urine NEGATIVE      Leukocyte Esterase, Urine NEGATIVE      Narrative:     OVER is reported when the result is greater than the clinically reportable range.   URINALYSIS WITH REFLEX CULTURE AND MICROSCOPIC    Narrative:     The following orders were created for panel order Urinalysis with Reflex Culture and Microscopic.  Procedure                               Abnormality         Status                     ---------                               -----------         ------                     Urinalysis with Reflex C...[[id number]]  Abnormal            Final result               Extra Urine Gray Tube[[id number]]                            In process                   Please view results for these tests on the individual orders.   EXTRA URINE GRAY TUBE   C. TRACHOMATIS + N. GONORRHOEAE, AMPLIFIED   URINALYSIS MICROSCOPIC WITH REFLEX CULTURE    WBC, Urine 1-5      RBC, Urine 1-2      Squamous Epithelial Cells, Urine 10-25 (FEW)        No orders to display          Case discussed with: ED attending      Considerations/further MDM:  Differential includes UTI, dehydration, electrolyte disturbance, hypertension, medication misuse noncompliance, diabetes, uncontrolled diabetes.  Patient ordered for diabetic education, recommend outpatient follow-up, return precaution follow-up instructions, patient feels comfortable  with home-going plan      Patient has been discharged by attending physician.  Please refer to their note for details of discharge instructions, home going plan, home going medications, return precautions follow-up instructions and final disposition plan      Procedure  Procedures     Krishna Colunga PA-C  12/03/24 1257

## 2024-12-03 NOTE — DISCHARGE INSTRUCTIONS
The cause of your symptoms unclear but you were treated for potential infection.  Follow-up with urologist if symptoms not improving.  Your blood sugar was elevated today monitor closely and follow a diabetic diet.

## 2024-12-03 NOTE — ED TRIAGE NOTES
Pt sts blood in his urine that started yesterday. Pt sts after he was done urinating it started to sting. Pt sts he's had kidney stones before.

## 2024-12-04 LAB
C TRACH RRNA SPEC QL NAA+PROBE: NEGATIVE
N GONORRHOEA DNA SPEC QL PROBE+SIG AMP: NEGATIVE

## 2024-12-10 NOTE — NURSING NOTE
"Late entry - Met with patient in [facility] to discuss self care management of T2DM.  Discussed HbA1c 11.6% dated 11/10/23.   Patient states, \"has had T2DM for about 12 years\".  Denies having medical insurance.  Patient with a lot of questions regarding diet.   Handouts given.  Discussed long term complications of hyperglycemia on the body.  Verbalizes understanding.   "

## 2025-02-10 ENCOUNTER — APPOINTMENT (OUTPATIENT)
Dept: RADIOLOGY | Facility: HOSPITAL | Age: 55
End: 2025-02-10
Payer: MEDICAID

## 2025-02-10 ENCOUNTER — HOSPITAL ENCOUNTER (INPATIENT)
Facility: HOSPITAL | Age: 55
LOS: 2 days | Discharge: HOME | End: 2025-02-13
Attending: EMERGENCY MEDICINE | Admitting: INTERNAL MEDICINE
Payer: MEDICAID

## 2025-02-10 ENCOUNTER — HOSPITAL ENCOUNTER (EMERGENCY)
Facility: HOSPITAL | Age: 55
Discharge: ANOTHER HOSPITAL | End: 2025-02-10
Payer: MEDICAID

## 2025-02-10 VITALS
HEIGHT: 67 IN | HEART RATE: 105 BPM | SYSTOLIC BLOOD PRESSURE: 167 MMHG | TEMPERATURE: 99.5 F | WEIGHT: 280 LBS | DIASTOLIC BLOOD PRESSURE: 102 MMHG | BODY MASS INDEX: 43.95 KG/M2 | RESPIRATION RATE: 18 BRPM | OXYGEN SATURATION: 97 %

## 2025-02-10 DIAGNOSIS — E11.9 INSULIN DEPENDENT TYPE 2 DIABETES MELLITUS (MULTI): ICD-10-CM

## 2025-02-10 DIAGNOSIS — Z79.4 INSULIN DEPENDENT TYPE 2 DIABETES MELLITUS (MULTI): ICD-10-CM

## 2025-02-10 DIAGNOSIS — Z00.00 HEALTHCARE MAINTENANCE: ICD-10-CM

## 2025-02-10 DIAGNOSIS — N34.0 PERIURETHRAL ABSCESS: Primary | ICD-10-CM

## 2025-02-10 DIAGNOSIS — L02.215 ABSCESS OF PERINEUM: Primary | ICD-10-CM

## 2025-02-10 DIAGNOSIS — I10 PRIMARY HYPERTENSION: ICD-10-CM

## 2025-02-10 DIAGNOSIS — N34.0 URETHRAL ABSCESS: ICD-10-CM

## 2025-02-10 DIAGNOSIS — E78.5 HYPERLIPIDEMIA, UNSPECIFIED HYPERLIPIDEMIA TYPE: ICD-10-CM

## 2025-02-10 LAB
ANION GAP SERPL CALCULATED.3IONS-SCNC: 14 MMOL/L (ref 10–20)
APPEARANCE UR: CLEAR
BASOPHILS # BLD AUTO: 0.05 X10*3/UL (ref 0–0.1)
BASOPHILS NFR BLD AUTO: 0.5 %
BILIRUB UR STRIP.AUTO-MCNC: NEGATIVE MG/DL
BUN SERPL-MCNC: 9 MG/DL (ref 6–23)
CALCIUM SERPL-MCNC: 9.2 MG/DL (ref 8.6–10.3)
CHLORIDE SERPL-SCNC: 98 MMOL/L (ref 98–107)
CO2 SERPL-SCNC: 25 MMOL/L (ref 21–32)
COLOR UR: ABNORMAL
CREAT SERPL-MCNC: 0.78 MG/DL (ref 0.5–1.3)
EGFRCR SERPLBLD CKD-EPI 2021: >90 ML/MIN/1.73M*2
EOSINOPHIL # BLD AUTO: 0.02 X10*3/UL (ref 0–0.7)
EOSINOPHIL NFR BLD AUTO: 0.2 %
ERYTHROCYTE [DISTWIDTH] IN BLOOD BY AUTOMATED COUNT: 15.1 % (ref 11.5–14.5)
GLUCOSE BLD MANUAL STRIP-MCNC: 313 MG/DL (ref 74–99)
GLUCOSE SERPL-MCNC: 256 MG/DL (ref 74–99)
GLUCOSE UR STRIP.AUTO-MCNC: ABNORMAL MG/DL
HCT VFR BLD AUTO: 48.4 % (ref 41–52)
HGB BLD-MCNC: 15.7 G/DL (ref 13.5–17.5)
IMM GRANULOCYTES # BLD AUTO: 0.07 X10*3/UL (ref 0–0.7)
IMM GRANULOCYTES NFR BLD AUTO: 0.7 % (ref 0–0.9)
KETONES UR STRIP.AUTO-MCNC: ABNORMAL MG/DL
LEUKOCYTE ESTERASE UR QL STRIP.AUTO: NEGATIVE
LYMPHOCYTES # BLD AUTO: 1.28 X10*3/UL (ref 1.2–4.8)
LYMPHOCYTES NFR BLD AUTO: 12.8 %
MCH RBC QN AUTO: 26.3 PG (ref 26–34)
MCHC RBC AUTO-ENTMCNC: 32.4 G/DL (ref 32–36)
MCV RBC AUTO: 81 FL (ref 80–100)
MONOCYTES # BLD AUTO: 0.95 X10*3/UL (ref 0.1–1)
MONOCYTES NFR BLD AUTO: 9.5 %
NEUTROPHILS # BLD AUTO: 7.62 X10*3/UL (ref 1.2–7.7)
NEUTROPHILS NFR BLD AUTO: 76.3 %
NITRITE UR QL STRIP.AUTO: NEGATIVE
NRBC BLD-RTO: 0 /100 WBCS (ref 0–0)
PH UR STRIP.AUTO: 5.5 [PH]
PLATELET # BLD AUTO: 154 X10*3/UL (ref 150–450)
POTASSIUM SERPL-SCNC: 4.1 MMOL/L (ref 3.5–5.3)
PROT UR STRIP.AUTO-MCNC: ABNORMAL MG/DL
RBC # BLD AUTO: 5.96 X10*6/UL (ref 4.5–5.9)
RBC # UR STRIP.AUTO: ABNORMAL MG/DL
RBC #/AREA URNS AUTO: NORMAL /HPF
SODIUM SERPL-SCNC: 133 MMOL/L (ref 136–145)
SP GR UR STRIP.AUTO: 1.03
SQUAMOUS #/AREA URNS AUTO: NORMAL /HPF
UROBILINOGEN UR STRIP.AUTO-MCNC: NORMAL MG/DL
WBC # BLD AUTO: 10 X10*3/UL (ref 4.4–11.3)
WBC #/AREA URNS AUTO: NORMAL /HPF

## 2025-02-10 PROCEDURE — 82947 ASSAY GLUCOSE BLOOD QUANT: CPT

## 2025-02-10 PROCEDURE — 87086 URINE CULTURE/COLONY COUNT: CPT

## 2025-02-10 PROCEDURE — 99285 EMERGENCY DEPT VISIT HI MDM: CPT | Mod: 25

## 2025-02-10 PROCEDURE — 74177 CT ABD & PELVIS W/CONTRAST: CPT

## 2025-02-10 PROCEDURE — 87536 HIV-1 QUANT&REVRSE TRNSCRPJ: CPT | Mod: WESLAB

## 2025-02-10 PROCEDURE — 85025 COMPLETE CBC W/AUTO DIFF WBC: CPT

## 2025-02-10 PROCEDURE — 80053 COMPREHEN METABOLIC PANEL: CPT

## 2025-02-10 PROCEDURE — 74177 CT ABD & PELVIS W/CONTRAST: CPT | Performed by: RADIOLOGY

## 2025-02-10 PROCEDURE — 36415 COLL VENOUS BLD VENIPUNCTURE: CPT

## 2025-02-10 PROCEDURE — 99285 EMERGENCY DEPT VISIT HI MDM: CPT | Performed by: EMERGENCY MEDICINE

## 2025-02-10 PROCEDURE — 83036 HEMOGLOBIN GLYCOSYLATED A1C: CPT

## 2025-02-10 PROCEDURE — 2550000001 HC RX 255 CONTRASTS

## 2025-02-10 PROCEDURE — 36415 COLL VENOUS BLD VENIPUNCTURE: CPT | Performed by: EMERGENCY MEDICINE

## 2025-02-10 PROCEDURE — 81001 URINALYSIS AUTO W/SCOPE: CPT

## 2025-02-10 PROCEDURE — 85025 COMPLETE CBC W/AUTO DIFF WBC: CPT | Performed by: EMERGENCY MEDICINE

## 2025-02-10 PROCEDURE — 80048 BASIC METABOLIC PNL TOTAL CA: CPT | Performed by: EMERGENCY MEDICINE

## 2025-02-10 PROCEDURE — 2500000004 HC RX 250 GENERAL PHARMACY W/ HCPCS (ALT 636 FOR OP/ED)

## 2025-02-10 RX ORDER — ACETAMINOPHEN 325 MG/1
975 TABLET ORAL ONCE AS NEEDED
Status: DISCONTINUED | OUTPATIENT
Start: 2025-02-10 | End: 2025-02-11

## 2025-02-10 RX ADMIN — IOHEXOL 75 ML: 350 INJECTION, SOLUTION INTRAVENOUS at 15:02

## 2025-02-10 RX ADMIN — PIPERACILLIN SODIUM AND TAZOBACTAM SODIUM 3.38 G: 3; .375 INJECTION, SOLUTION INTRAVENOUS at 19:23

## 2025-02-10 ASSESSMENT — PAIN SCALES - GENERAL
PAINLEVEL_OUTOF10: 7
PAINLEVEL_OUTOF10: 3

## 2025-02-10 ASSESSMENT — PAIN DESCRIPTION - PROGRESSION: CLINICAL_PROGRESSION: NOT CHANGED

## 2025-02-10 ASSESSMENT — LIFESTYLE VARIABLES
TOTAL SCORE: 0
HAVE PEOPLE ANNOYED YOU BY CRITICIZING YOUR DRINKING: NO
EVER FELT BAD OR GUILTY ABOUT YOUR DRINKING: NO
HAVE PEOPLE ANNOYED YOU BY CRITICIZING YOUR DRINKING: NO
EVER HAD A DRINK FIRST THING IN THE MORNING TO STEADY YOUR NERVES TO GET RID OF A HANGOVER: NO
HAVE YOU EVER FELT YOU SHOULD CUT DOWN ON YOUR DRINKING: NO
EVER FELT BAD OR GUILTY ABOUT YOUR DRINKING: NO
TOTAL SCORE: 0
EVER HAD A DRINK FIRST THING IN THE MORNING TO STEADY YOUR NERVES TO GET RID OF A HANGOVER: NO
HAVE YOU EVER FELT YOU SHOULD CUT DOWN ON YOUR DRINKING: NO

## 2025-02-10 ASSESSMENT — PAIN DESCRIPTION - FREQUENCY: FREQUENCY: CONSTANT/CONTINUOUS

## 2025-02-10 ASSESSMENT — PAIN - FUNCTIONAL ASSESSMENT
PAIN_FUNCTIONAL_ASSESSMENT: 0-10
PAIN_FUNCTIONAL_ASSESSMENT: 0-10

## 2025-02-10 ASSESSMENT — COLUMBIA-SUICIDE SEVERITY RATING SCALE - C-SSRS
6. HAVE YOU EVER DONE ANYTHING, STARTED TO DO ANYTHING, OR PREPARED TO DO ANYTHING TO END YOUR LIFE?: NO
2. HAVE YOU ACTUALLY HAD ANY THOUGHTS OF KILLING YOURSELF?: NO
1. IN THE PAST MONTH, HAVE YOU WISHED YOU WERE DEAD OR WISHED YOU COULD GO TO SLEEP AND NOT WAKE UP?: NO
1. IN THE PAST MONTH, HAVE YOU WISHED YOU WERE DEAD OR WISHED YOU COULD GO TO SLEEP AND NOT WAKE UP?: NO
6. HAVE YOU EVER DONE ANYTHING, STARTED TO DO ANYTHING, OR PREPARED TO DO ANYTHING TO END YOUR LIFE?: NO
2. HAVE YOU ACTUALLY HAD ANY THOUGHTS OF KILLING YOURSELF?: NO

## 2025-02-10 ASSESSMENT — PAIN DESCRIPTION - PAIN TYPE: TYPE: ACUTE PAIN

## 2025-02-10 ASSESSMENT — PAIN DESCRIPTION - LOCATION: LOCATION: PENIS

## 2025-02-10 NOTE — ED PROVIDER NOTES
HPI   Chief Complaint   Patient presents with    Abscess       Patient is a 54-year-old male with past medical history of abscesses presenting concerns for abscess.  He states that he has had significant dysuria the last several days and feels that there is an abscess in his buttock region.  He states he has had abscesses here and in the urethra before.  Denies any active drainage of infection at this time.  States he is having discomfort with urination and with having bowel movements.  Patient denies fevers, chills, cough, sore throat, runny nose, chest pain, shortness of breath, abdominal pain, nausea, vomiting, diarrhea or urinary complaints.              Patient History   Past Medical History:   Diagnosis Date    Acute myocardial infarction, unspecified (Multi) 11/11/2020    Acute MI    Complex tear of medial meniscus, current injury, right knee, initial encounter 06/17/2020    Complex tear of medial meniscus of right knee as current injury, initial encounter    Cutaneous abscess of perineum 01/04/2021    Perineal abscess    Diabetes mellitus (Multi)     Effusion, right knee 09/15/2020    Knee effusion, right    Encounter for other specified aftercare 05/04/2021    Visit for wound check    Low back pain, unspecified 06/20/2018    Lumbar pain    Low back pain, unspecified 01/16/2019    Lumbar pain    Other conditions influencing health status 12/17/2020    History of cough    Other specified health status     No pertinent past medical history    Pain in left knee     Left knee pain, unspecified chronicity    Personal history of other diseases of male genital organs 07/09/2020    History of balanitis    Radiculopathy, lumbar region 05/03/2019    Acute lumbar radiculopathy    Urinary tract infection, site not specified 05/04/2021    Acute urinary tract infection     Past Surgical History:   Procedure Laterality Date    OTHER SURGICAL HISTORY  07/09/2020    Knee surgery    OTHER SURGICAL HISTORY  07/09/2020     Abscess incision and drainage     No family history on file.  Social History     Tobacco Use    Smoking status: Every Day     Current packs/day: 1.00     Average packs/day: 1 pack/day for 20.0 years (20.0 ttl pk-yrs)     Types: Cigarettes    Smokeless tobacco: Not on file   Vaping Use    Vaping status: Never Used   Substance Use Topics    Alcohol use: Yes    Drug use: Not Currently     Types: Marijuana       Physical Exam   ED Triage Vitals [02/10/25 1216]   Temperature Heart Rate Respirations BP   37.5 °C (99.5 °F) (!) 105 18 (!) 167/102      Pulse Ox Temp Source Heart Rate Source Patient Position   97 % Temporal Monitor Sitting      BP Location FiO2 (%)     Left arm --       Physical Exam  Vitals and nursing note reviewed.   Constitutional:       Appearance: He is well-developed.      Comments: Awake, sitting in examination chair   HENT:      Head: Normocephalic and atraumatic.      Nose: Nose normal.      Mouth/Throat:      Mouth: Mucous membranes are moist.      Pharynx: Oropharynx is clear.   Eyes:      Extraocular Movements: Extraocular movements intact.      Conjunctiva/sclera: Conjunctivae normal.      Pupils: Pupils are equal, round, and reactive to light.   Cardiovascular:      Rate and Rhythm: Normal rate and regular rhythm.      Pulses: Normal pulses.      Heart sounds: Normal heart sounds. No murmur heard.  Pulmonary:      Effort: Pulmonary effort is normal. No respiratory distress.      Breath sounds: Normal breath sounds.   Abdominal:      General: Abdomen is flat.      Palpations: Abdomen is soft.      Tenderness: There is no abdominal tenderness.   Musculoskeletal:         General: No swelling. Normal range of motion.      Cervical back: Normal range of motion and neck supple.   Skin:     General: Skin is warm and dry.      Capillary Refill: Capillary refill takes less than 2 seconds.   Neurological:      General: No focal deficit present.      Mental Status: He is alert and oriented to person,  place, and time.   Psychiatric:         Mood and Affect: Mood normal.         Behavior: Behavior normal.           ED Course & MDM   ED Course as of 02/10/25 1917   Mon Feb 10, 2025   4887 I spoke with general surgeon, Dr. Rey, who is looking into the imaging.  [AR]   1917 Biotics were ordered and patient is going via private vehicle.  EMTALA done. [AR]      ED Course User Index  [AR] Justin Leal, JORGE         Diagnoses as of 02/10/25 1917   Periurethral abscess                 No data recorded     [place] Coma Scale Score: 15 (02/10/25 1212 : Ankita Florence, RN)                           Medical Decision Making  Patient is a 54-year-old male with past medical history of abscesses presenting with concerns for abscess.  Labwork, urine, imaging ordered.  Conditions considered include but are not limited to: Intra-abdominal finding, urethral finding, UTI.    I saw this patient in conjunction with Dr. Osorio.  CBC is without leukocytosis or anemia.  MP without significant electrolyte abnormality or renal impairment.  UA with micro is without infection.  CAT scan shows a 4.6 low-density collection suspicious for abscess in the perineum.  Dr. eRy, general surgery evaluated but believes this is a urologic concern.  I spoke with Dr. Spence of urology who recommends him this patient sent downtown due to complication in patient's history.  Patient has had 3 open drainages for perirectal abscesses and a urethroplasty in place.  He states this would be too complicated at this facility.  Recommended transfer downtown.    Currently pending consultation.  Antibiotics ordered.  I just spoke with Dr. Garcia the urologist at [facility] [place].  He states he is agreeable to have this patient transferred.  He states transfer to [facility] [place] ED.  I then spoke with Dr. Doyle, ED provider.  After discussions, patient will be transferred.  He states that he would prefer to go via private vehicle.  EMTALA has been signed.  Patient is stable and  will transfer.    Portions of this note made with Dragon software, please be mindful of potential grammatical errors.      Medications   piperacillin-tazobactam (Zosyn) 3.375 g in dextrose (iso) IV 50 mL (has no administration in time range)   iohexol (OMNIPaque) 350 mg iodine/mL solution 75 mL (75 mL intravenous Given 2/10/25 1502)     Labs Reviewed   CBC WITH AUTO DIFFERENTIAL - Abnormal       Result Value    WBC 10.0      nRBC 0.0      RBC 5.96 (*)     Hemoglobin 15.7      Hematocrit 48.4      MCV 81      MCH 26.3      MCHC 32.4      RDW 15.1 (*)     Platelets 154      Neutrophils % 76.3      Immature Granulocytes %, Automated 0.7      Lymphocytes % 12.8      Monocytes % 9.5      Eosinophils % 0.2      Basophils % 0.5      Neutrophils Absolute 7.62      Immature Granulocytes Absolute, Automated 0.07      Lymphocytes Absolute 1.28      Monocytes Absolute 0.95      Eosinophils Absolute 0.02      Basophils Absolute 0.05     BASIC METABOLIC PANEL - Abnormal    Glucose 256 (*)     Sodium 133 (*)     Potassium 4.1      Chloride 98      Bicarbonate 25      Anion Gap 14      Urea Nitrogen 9      Creatinine 0.78      eGFR >90      Calcium 9.2     URINALYSIS WITH REFLEX CULTURE AND MICROSCOPIC - Abnormal    Color, Urine Light-Yellow      Appearance, Urine Clear      Specific Gravity, Urine 1.030      pH, Urine 5.5      Protein, Urine 20 (TRACE)      Glucose, Urine OVER (4+) (*)     Blood, Urine 0.03 (TRACE) (*)     Ketones, Urine 80 (3+) (*)     Bilirubin, Urine NEGATIVE      Urobilinogen, Urine Normal      Nitrite, Urine NEGATIVE      Leukocyte Esterase, Urine NEGATIVE      Narrative:     OVER is reported when the result is greater than the clinically reportable range.   URINALYSIS WITH REFLEX CULTURE AND MICROSCOPIC    Narrative:     The following orders were created for panel order Urinalysis with Reflex Culture and Microscopic.  Procedure                               Abnormality         Status                      ---------                               -----------         ------                     Urinalysis with Reflex C...[[id number]]  Abnormal            Final result               Extra Urine Gray Tube[[id number]]                            In process                   Please view results for these tests on the individual orders.   EXTRA URINE GRAY TUBE   URINALYSIS MICROSCOPIC WITH REFLEX CULTURE    WBC, Urine 1-5      RBC, Urine 3-5      Squamous Epithelial Cells, Urine 1-9 (SPARSE)       CT abdomen pelvis w IV contrast   Final Result   4.6 cm low-density collection suspicious for abscess in the perineum,   inferior extent not fully included on this exam.        Mild ill-defined thickening of the right lateral bladder wall,   possible cystitis. Correlation with clinical findings and urinalysis   suggested.        Diffuse fatty infiltration of the liver.        Other findings as described above.        MACRO:   None.        Signed by: Miryam Eid 2/10/2025 3:38 PM   Dictation workstation:   [id number]            Procedure  Procedures     Justin Leal PA-C  02/10/25 1917

## 2025-02-10 NOTE — ED PROVIDER NOTES
THIS IS MY [facility] SUPERVISORY AND SHARED VISIT NOTE:    I personally saw the patient and made/approved the management plan and take responsibility for the patient management.    History: Patient is a 54-year-old male who presents today with a chief complaint of an abscess.  Patient had worsening dysuria for the last several days, he felt like there was an abscess in his buttock area, he has had abscesses here and in the urethra before, denies any drainage, denies any other fevers or chills, [place] any chest pain shortness of breath, dizziness or blurred vision.    Exam: GENERAL APPEARANCE: Awake and alert.     HEENT: Normocephalic, atraumatic. Extraocular muscles are intact. Pupils equal round and reactive to light.  CHEST: Nontender to palpation. Clear to auscultation bilaterally. No rales, rhonchi, or wheezing.   HEART: S1, S2. Regular rate and rhythm. No murmurs, gallops or rubs.  Strong and equal pulses in the extremities.   ABDOMEN: Soft,.  non-tender.  No rebound or guarding, bowel sounds normal x 4 quadrants  NEUROLOGICAL: Awake, alert and oriented x 3.       MDM: Patient seen and evaluated at bedside, patient is in no acute distress.  I will order a CBC, CMP, CT abdomen pelvis, urinalysis. Differential diagnosis includes but is not limited to perirectal abscess, urethral abscess, UTI, pyelonephritis.  Patient CT scan did show a 4.6 cm low-density collection suspicious for abscess in the perineum, initially general surgery evaluated the patient, they said this is more urologic area, patient was discussed with on-call urology, patient had a complicated neuro logic history, 3 open drainages for perirectal and a urethroplasty.  Patient will be transferred to [facility] by private vehicle.  Given IV Zosyn prior to departure.    Diagnosis: Juan urethral abscess  CT abdomen pelvis w IV contrast   Final Result   4.6 cm low-density collection suspicious for abscess in the perineum,   inferior extent not  fully included on this exam.        Mild ill-defined thickening of the right lateral bladder wall,   possible cystitis. Correlation with clinical findings and urinalysis   suggested.        Diffuse fatty infiltration of the liver.        Other findings as described above.        MACRO:   None.        Signed by: Miryam Eid 2/10/2025 3:38 PM   Dictation workstation:   [id number]        Results for orders placed or performed during the hospital encounter of 02/10/25   CBC and Auto Differential    Collection Time: 02/10/25  1:16 PM   Result Value Ref Range    WBC 10.0 4.4 - 11.3 x10*3/uL    nRBC 0.0 0.0 - 0.0 /100 WBCs    RBC 5.96 (H) 4.50 - 5.90 x10*6/uL    Hemoglobin 15.7 13.5 - 17.5 g/dL    Hematocrit 48.4 41.0 - 52.0 %    MCV 81 80 - 100 fL    MCH 26.3 26.0 - 34.0 pg    MCHC 32.4 32.0 - 36.0 g/dL    RDW 15.1 (H) 11.5 - 14.5 %    Platelets 154 150 - 450 x10*3/uL    Neutrophils % 76.3 40.0 - 80.0 %    Immature Granulocytes %, Automated 0.7 0.0 - 0.9 %    Lymphocytes % 12.8 13.0 - 44.0 %    Monocytes % 9.5 2.0 - 10.0 %    Eosinophils % 0.2 0.0 - 6.0 %    Basophils % 0.5 0.0 - 2.0 %    Neutrophils Absolute 7.62 1.20 - 7.70 x10*3/uL    Immature Granulocytes Absolute, Automated 0.07 0.00 - 0.70 x10*3/uL    Lymphocytes Absolute 1.28 1.20 - 4.80 x10*3/uL    Monocytes Absolute 0.95 0.10 - 1.00 x10*3/uL    Eosinophils Absolute 0.02 0.00 - 0.70 x10*3/uL    Basophils Absolute 0.05 0.00 - 0.10 x10*3/uL   Basic metabolic panel    Collection Time: 02/10/25  1:16 PM   Result Value Ref Range    Glucose 256 (H) 74 - 99 mg/dL    Sodium 133 (L) 136 - 145 mmol/L    Potassium 4.1 3.5 - 5.3 mmol/L    Chloride 98 98 - 107 mmol/L    Bicarbonate 25 21 - 32 mmol/L    Anion Gap 14 10 - 20 mmol/L    Urea Nitrogen 9 6 - 23 mg/dL    Creatinine 0.78 0.50 - 1.30 mg/dL    eGFR >90 >60 mL/min/1.73m*2    Calcium 9.2 8.6 - 10.3 mg/dL   Urinalysis with Reflex Culture and Microscopic    Collection Time: 02/10/25  1:53 PM   Result Value Ref Range     Color, Urine Light-Yellow Light-Yellow, Yellow, Dark-Yellow    Appearance, Urine Clear Clear    Specific Gravity, Urine 1.030 1.005 - 1.035    pH, Urine 5.5 5.0, 5.5, 6.0, 6.5, 7.0, 7.5, 8.0    Protein, Urine 20 (TRACE) NEGATIVE, 10 (TRACE), 20 (TRACE) mg/dL    Glucose, Urine OVER (4+) (A) Normal mg/dL    Blood, Urine 0.03 (TRACE) (A) NEGATIVE mg/dL    Ketones, Urine 80 (3+) (A) NEGATIVE mg/dL    Bilirubin, Urine NEGATIVE NEGATIVE mg/dL    Urobilinogen, Urine Normal Normal mg/dL    Nitrite, Urine NEGATIVE NEGATIVE    Leukocyte Esterase, Urine NEGATIVE NEGATIVE   Urinalysis Microscopic    Collection Time: 02/10/25  1:53 PM   Result Value Ref Range    WBC, Urine 1-5 1-5, NONE /HPF    RBC, Urine 3-5 NONE, 1-2, 3-5 /HPF    Squamous Epithelial Cells, Urine 1-9 (SPARSE) Reference range not established. /HPF         Please see [facility] note for further details    Sections of this report were created using voice-to-text technology and may contain errors in translation    All Osorio DO  Emergency Medicine       All Osorio DO  02/10/25 0320

## 2025-02-10 NOTE — ED TRIAGE NOTES
Pt ambulatory to triage for a possible abscess, pt describes it as being inside his urethra and under his rectum. Pt sts he's been in pain for the last 2 days. Pt has a history of abscesses

## 2025-02-11 PROBLEM — L02.215 ABSCESS OF PERINEUM: Status: ACTIVE | Noted: 2025-02-11

## 2025-02-11 LAB
ABO GROUP (TYPE) IN BLOOD: NORMAL
ALBUMIN SERPL BCP-MCNC: 3.9 G/DL (ref 3.4–5)
ALBUMIN SERPL BCP-MCNC: 4.3 G/DL (ref 3.4–5)
ALP SERPL-CCNC: 64 U/L (ref 33–120)
ALP SERPL-CCNC: 71 U/L (ref 33–120)
ALT SERPL W P-5'-P-CCNC: 14 U/L (ref 10–52)
ALT SERPL W P-5'-P-CCNC: 15 U/L (ref 10–52)
ANION GAP SERPL CALC-SCNC: 13 MMOL/L (ref 10–20)
ANION GAP SERPL CALC-SCNC: 14 MMOL/L (ref 10–20)
ANTIBODY SCREEN: NORMAL
APPEARANCE UR: CLEAR
APTT PPP: 25 SECONDS (ref 27–38)
AST SERPL W P-5'-P-CCNC: 10 U/L (ref 9–39)
AST SERPL W P-5'-P-CCNC: 11 U/L (ref 9–39)
BACTERIA UR CULT: NO GROWTH
BASOPHILS # BLD AUTO: 0.05 X10*3/UL (ref 0–0.1)
BASOPHILS # BLD AUTO: 0.05 X10*3/UL (ref 0–0.1)
BASOPHILS NFR BLD AUTO: 0.5 %
BASOPHILS NFR BLD AUTO: 0.5 %
BB ANTIBODY IDENTIFICATION: NORMAL
BILIRUB SERPL-MCNC: 0.5 MG/DL (ref 0–1.2)
BILIRUB SERPL-MCNC: 0.6 MG/DL (ref 0–1.2)
BILIRUB UR STRIP.AUTO-MCNC: NEGATIVE MG/DL
BUN SERPL-MCNC: 11 MG/DL (ref 6–23)
BUN SERPL-MCNC: 13 MG/DL (ref 6–23)
C TRACH RRNA SPEC QL NAA+PROBE: NEGATIVE
CALCIUM SERPL-MCNC: 9.2 MG/DL (ref 8.6–10.6)
CALCIUM SERPL-MCNC: 9.5 MG/DL (ref 8.6–10.6)
CASE #: NORMAL
CHLORIDE SERPL-SCNC: 98 MMOL/L (ref 98–107)
CHLORIDE SERPL-SCNC: 99 MMOL/L (ref 98–107)
CO2 SERPL-SCNC: 26 MMOL/L (ref 21–32)
CO2 SERPL-SCNC: 28 MMOL/L (ref 21–32)
COLOR UR: ABNORMAL
CREAT SERPL-MCNC: 0.79 MG/DL (ref 0.5–1.3)
CREAT SERPL-MCNC: 0.86 MG/DL (ref 0.5–1.3)
EGFRCR SERPLBLD CKD-EPI 2021: >90 ML/MIN/1.73M*2
EGFRCR SERPLBLD CKD-EPI 2021: >90 ML/MIN/1.73M*2
EOSINOPHIL # BLD AUTO: 0.09 X10*3/UL (ref 0–0.7)
EOSINOPHIL # BLD AUTO: 0.1 X10*3/UL (ref 0–0.7)
EOSINOPHIL NFR BLD AUTO: 0.8 %
EOSINOPHIL NFR BLD AUTO: 1 %
ERYTHROCYTE [DISTWIDTH] IN BLOOD BY AUTOMATED COUNT: 14.5 % (ref 11.5–14.5)
ERYTHROCYTE [DISTWIDTH] IN BLOOD BY AUTOMATED COUNT: 14.5 % (ref 11.5–14.5)
EST. AVERAGE GLUCOSE BLD GHB EST-MCNC: 352 MG/DL
GLUCOSE BLD MANUAL STRIP-MCNC: 229 MG/DL (ref 74–99)
GLUCOSE BLD MANUAL STRIP-MCNC: 234 MG/DL (ref 74–99)
GLUCOSE BLD MANUAL STRIP-MCNC: 255 MG/DL (ref 74–99)
GLUCOSE BLD MANUAL STRIP-MCNC: 273 MG/DL (ref 74–99)
GLUCOSE BLD MANUAL STRIP-MCNC: 418 MG/DL (ref 74–99)
GLUCOSE BLD MANUAL STRIP-MCNC: 498 MG/DL (ref 74–99)
GLUCOSE SERPL-MCNC: 247 MG/DL (ref 74–99)
GLUCOSE SERPL-MCNC: 284 MG/DL (ref 74–99)
GLUCOSE UR STRIP.AUTO-MCNC: ABNORMAL MG/DL
HBA1C MFR BLD: 13.9 %
HCT VFR BLD AUTO: 44.8 % (ref 41–52)
HCT VFR BLD AUTO: 47.5 % (ref 41–52)
HGB BLD-MCNC: 14.8 G/DL (ref 13.5–17.5)
HGB BLD-MCNC: 15.5 G/DL (ref 13.5–17.5)
HOLD SPECIMEN: NORMAL
HOLD SPECIMEN: NORMAL
IMM GRANULOCYTES # BLD AUTO: 0.05 X10*3/UL (ref 0–0.7)
IMM GRANULOCYTES # BLD AUTO: 0.06 X10*3/UL (ref 0–0.7)
IMM GRANULOCYTES NFR BLD AUTO: 0.5 % (ref 0–0.9)
IMM GRANULOCYTES NFR BLD AUTO: 0.5 % (ref 0–0.9)
INR PPP: 1.1 (ref 0.9–1.1)
KETONES UR STRIP.AUTO-MCNC: ABNORMAL MG/DL
LEUKOCYTE ESTERASE UR QL STRIP.AUTO: ABNORMAL
LYMPHOCYTES # BLD AUTO: 2.06 X10*3/UL (ref 1.2–4.8)
LYMPHOCYTES # BLD AUTO: 2.12 X10*3/UL (ref 1.2–4.8)
LYMPHOCYTES NFR BLD AUTO: 19.4 %
LYMPHOCYTES NFR BLD AUTO: 20.9 %
MAGNESIUM SERPL-MCNC: 1.97 MG/DL (ref 1.6–2.4)
MCH RBC QN AUTO: 25.9 PG (ref 26–34)
MCH RBC QN AUTO: 26.4 PG (ref 26–34)
MCHC RBC AUTO-ENTMCNC: 32.6 G/DL (ref 32–36)
MCHC RBC AUTO-ENTMCNC: 33 G/DL (ref 32–36)
MCV RBC AUTO: 79 FL (ref 80–100)
MCV RBC AUTO: 81 FL (ref 80–100)
MONOCYTES # BLD AUTO: 0.88 X10*3/UL (ref 0.1–1)
MONOCYTES # BLD AUTO: 0.92 X10*3/UL (ref 0.1–1)
MONOCYTES NFR BLD AUTO: 8.4 %
MONOCYTES NFR BLD AUTO: 8.9 %
MUCOUS THREADS #/AREA URNS AUTO: ABNORMAL /LPF
N GONORRHOEA DNA SPEC QL PROBE+SIG AMP: NEGATIVE
NEUTROPHILS # BLD AUTO: 6.73 X10*3/UL (ref 1.2–7.7)
NEUTROPHILS # BLD AUTO: 7.71 X10*3/UL (ref 1.2–7.7)
NEUTROPHILS NFR BLD AUTO: 68.2 %
NEUTROPHILS NFR BLD AUTO: 70.4 %
NITRITE UR QL STRIP.AUTO: NEGATIVE
NRBC BLD-RTO: 0 /100 WBCS (ref 0–0)
NRBC BLD-RTO: 0 /100 WBCS (ref 0–0)
PH UR STRIP.AUTO: 5.5 [PH]
PLATELET # BLD AUTO: 161 X10*3/UL (ref 150–450)
PLATELET # BLD AUTO: 162 X10*3/UL (ref 150–450)
POTASSIUM SERPL-SCNC: 3.8 MMOL/L (ref 3.5–5.3)
POTASSIUM SERPL-SCNC: 4.4 MMOL/L (ref 3.5–5.3)
PROT SERPL-MCNC: 6.8 G/DL (ref 6.4–8.2)
PROT SERPL-MCNC: 7.3 G/DL (ref 6.4–8.2)
PROT UR STRIP.AUTO-MCNC: ABNORMAL MG/DL
PROTHROMBIN TIME: 12 SECONDS (ref 9.8–12.8)
RBC # BLD AUTO: 5.71 X10*6/UL (ref 4.5–5.9)
RBC # BLD AUTO: 5.88 X10*6/UL (ref 4.5–5.9)
RBC # UR STRIP.AUTO: ABNORMAL MG/DL
RBC #/AREA URNS AUTO: ABNORMAL /HPF
RH FACTOR (ANTIGEN D): NORMAL
SODIUM SERPL-SCNC: 134 MMOL/L (ref 136–145)
SODIUM SERPL-SCNC: 136 MMOL/L (ref 136–145)
SP GR UR STRIP.AUTO: 1.04
SQUAMOUS #/AREA URNS AUTO: ABNORMAL /HPF
T VAGINALIS RRNA SPEC QL NAA+PROBE: NEGATIVE
UROBILINOGEN UR STRIP.AUTO-MCNC: NORMAL MG/DL
WBC # BLD AUTO: 11 X10*3/UL (ref 4.4–11.3)
WBC # BLD AUTO: 9.9 X10*3/UL (ref 4.4–11.3)
WBC #/AREA URNS AUTO: ABNORMAL /HPF

## 2025-02-11 PROCEDURE — 86870 RBC ANTIBODY IDENTIFICATION: CPT

## 2025-02-11 PROCEDURE — 2500000002 HC RX 250 W HCPCS SELF ADMINISTERED DRUGS (ALT 637 FOR MEDICARE OP, ALT 636 FOR OP/ED): Mod: SE

## 2025-02-11 PROCEDURE — 96366 THER/PROPH/DIAG IV INF ADDON: CPT

## 2025-02-11 PROCEDURE — 99223 1ST HOSP IP/OBS HIGH 75: CPT

## 2025-02-11 PROCEDURE — 1210000001 HC SEMI-PRIVATE ROOM DAILY

## 2025-02-11 PROCEDURE — 85610 PROTHROMBIN TIME: CPT | Performed by: STUDENT IN AN ORGANIZED HEALTH CARE EDUCATION/TRAINING PROGRAM

## 2025-02-11 PROCEDURE — 2500000004 HC RX 250 GENERAL PHARMACY W/ HCPCS (ALT 636 FOR OP/ED): Mod: SE

## 2025-02-11 PROCEDURE — 87070 CULTURE OTHR SPECIMN AEROBIC: CPT

## 2025-02-11 PROCEDURE — 86901 BLOOD TYPING SEROLOGIC RH(D): CPT

## 2025-02-11 PROCEDURE — 51798 US URINE CAPACITY MEASURE: CPT

## 2025-02-11 PROCEDURE — 80053 COMPREHEN METABOLIC PANEL: CPT

## 2025-02-11 PROCEDURE — 82947 ASSAY GLUCOSE BLOOD QUANT: CPT

## 2025-02-11 PROCEDURE — 36415 COLL VENOUS BLD VENIPUNCTURE: CPT | Performed by: STUDENT IN AN ORGANIZED HEALTH CARE EDUCATION/TRAINING PROGRAM

## 2025-02-11 PROCEDURE — 96365 THER/PROPH/DIAG IV INF INIT: CPT

## 2025-02-11 PROCEDURE — 96375 TX/PRO/DX INJ NEW DRUG ADDON: CPT

## 2025-02-11 PROCEDURE — S4991 NICOTINE PATCH NONLEGEND: HCPCS | Mod: SE

## 2025-02-11 PROCEDURE — 2500000001 HC RX 250 WO HCPCS SELF ADMINISTERED DRUGS (ALT 637 FOR MEDICARE OP): Mod: SE

## 2025-02-11 PROCEDURE — 36415 COLL VENOUS BLD VENIPUNCTURE: CPT

## 2025-02-11 PROCEDURE — 83735 ASSAY OF MAGNESIUM: CPT

## 2025-02-11 PROCEDURE — 85025 COMPLETE CBC W/AUTO DIFF WBC: CPT

## 2025-02-11 PROCEDURE — 87491 CHLMYD TRACH DNA AMP PROBE: CPT

## 2025-02-11 PROCEDURE — 87591 N.GONORRHOEAE DNA AMP PROB: CPT

## 2025-02-11 PROCEDURE — 87661 TRICHOMONAS VAGINALIS AMPLIF: CPT

## 2025-02-11 RX ORDER — INSULIN GLARGINE 100 [IU]/ML
40 INJECTION, SOLUTION SUBCUTANEOUS NIGHTLY
Status: DISCONTINUED | OUTPATIENT
Start: 2025-02-11 | End: 2025-02-11

## 2025-02-11 RX ORDER — ENOXAPARIN SODIUM 100 MG/ML
40 INJECTION SUBCUTANEOUS EVERY 12 HOURS SCHEDULED
Status: DISCONTINUED | OUTPATIENT
Start: 2025-02-11 | End: 2025-02-13 | Stop reason: HOSPADM

## 2025-02-11 RX ORDER — DIAZEPAM 5 MG/1
10 TABLET ORAL EVERY 2 HOUR PRN
Status: DISCONTINUED | OUTPATIENT
Start: 2025-02-11 | End: 2025-02-13 | Stop reason: HOSPADM

## 2025-02-11 RX ORDER — AMLODIPINE BESYLATE 5 MG/1
10 TABLET ORAL DAILY
Status: DISCONTINUED | OUTPATIENT
Start: 2025-02-11 | End: 2025-02-11

## 2025-02-11 RX ORDER — FOLIC ACID 1 MG/1
1 TABLET ORAL DAILY
Status: DISCONTINUED | OUTPATIENT
Start: 2025-02-11 | End: 2025-02-13 | Stop reason: HOSPADM

## 2025-02-11 RX ORDER — ACETAMINOPHEN 325 MG/1
975 TABLET ORAL EVERY 8 HOURS PRN
Status: DISCONTINUED | OUTPATIENT
Start: 2025-02-11 | End: 2025-02-13 | Stop reason: HOSPADM

## 2025-02-11 RX ORDER — DEXTROSE 50 % IN WATER (D50W) INTRAVENOUS SYRINGE
12.5
Status: DISCONTINUED | OUTPATIENT
Start: 2025-02-11 | End: 2025-02-12

## 2025-02-11 RX ORDER — AMLODIPINE BESYLATE 10 MG/1
10 TABLET ORAL DAILY
Status: DISCONTINUED | OUTPATIENT
Start: 2025-02-11 | End: 2025-02-13 | Stop reason: HOSPADM

## 2025-02-11 RX ORDER — INSULIN LISPRO 100 [IU]/ML
0-15 INJECTION, SOLUTION INTRAVENOUS; SUBCUTANEOUS
Status: DISCONTINUED | OUTPATIENT
Start: 2025-02-11 | End: 2025-02-11

## 2025-02-11 RX ORDER — METOPROLOL TARTRATE 25 MG/1
12.5 TABLET, FILM COATED ORAL 2 TIMES DAILY
Status: DISCONTINUED | OUTPATIENT
Start: 2025-02-11 | End: 2025-02-13 | Stop reason: HOSPADM

## 2025-02-11 RX ORDER — INSULIN GLARGINE 100 [IU]/ML
50 INJECTION, SOLUTION SUBCUTANEOUS NIGHTLY
Status: DISCONTINUED | OUTPATIENT
Start: 2025-02-11 | End: 2025-02-13 | Stop reason: HOSPADM

## 2025-02-11 RX ORDER — POLYETHYLENE GLYCOL 3350 17 G/17G
17 POWDER, FOR SOLUTION ORAL DAILY PRN
Status: DISCONTINUED | OUTPATIENT
Start: 2025-02-11 | End: 2025-02-13 | Stop reason: HOSPADM

## 2025-02-11 RX ORDER — INSULIN LISPRO 100 [IU]/ML
0-10 INJECTION, SOLUTION INTRAVENOUS; SUBCUTANEOUS EVERY 4 HOURS
Status: DISCONTINUED | OUTPATIENT
Start: 2025-02-11 | End: 2025-02-11

## 2025-02-11 RX ORDER — ROSUVASTATIN CALCIUM 40 MG/1
40 TABLET, COATED ORAL DAILY
Status: DISCONTINUED | OUTPATIENT
Start: 2025-02-11 | End: 2025-02-13 | Stop reason: HOSPADM

## 2025-02-11 RX ORDER — INSULIN LISPRO 100 [IU]/ML
0-15 INJECTION, SOLUTION INTRAVENOUS; SUBCUTANEOUS
Status: DISCONTINUED | OUTPATIENT
Start: 2025-02-11 | End: 2025-02-12

## 2025-02-11 RX ORDER — LABETALOL HYDROCHLORIDE 5 MG/ML
10 INJECTION, SOLUTION INTRAVENOUS ONCE
Status: COMPLETED | OUTPATIENT
Start: 2025-02-11 | End: 2025-02-11

## 2025-02-11 RX ORDER — LOSARTAN POTASSIUM 100 MG/1
100 TABLET ORAL DAILY
Status: DISCONTINUED | OUTPATIENT
Start: 2025-02-11 | End: 2025-02-13 | Stop reason: HOSPADM

## 2025-02-11 RX ORDER — INSULIN LISPRO 100 [IU]/ML
0-15 INJECTION, SOLUTION INTRAVENOUS; SUBCUTANEOUS
Status: DISCONTINUED | OUTPATIENT
Start: 2025-02-12 | End: 2025-02-11

## 2025-02-11 RX ORDER — DEXTROSE 50 % IN WATER (D50W) INTRAVENOUS SYRINGE
25
Status: DISCONTINUED | OUTPATIENT
Start: 2025-02-11 | End: 2025-02-12

## 2025-02-11 RX ORDER — INSULIN LISPRO 100 [IU]/ML
0-10 INJECTION, SOLUTION INTRAVENOUS; SUBCUTANEOUS EVERY 4 HOURS
Status: CANCELLED | OUTPATIENT
Start: 2025-02-11

## 2025-02-11 RX ORDER — IBUPROFEN 200 MG
1 TABLET ORAL DAILY
Status: DISCONTINUED | OUTPATIENT
Start: 2025-02-11 | End: 2025-02-13 | Stop reason: HOSPADM

## 2025-02-11 RX ORDER — LANOLIN ALCOHOL/MO/W.PET/CERES
100 CREAM (GRAM) TOPICAL DAILY
Status: DISCONTINUED | OUTPATIENT
Start: 2025-02-14 | End: 2025-02-13 | Stop reason: HOSPADM

## 2025-02-11 RX ORDER — LOSARTAN POTASSIUM 50 MG/1
100 TABLET ORAL DAILY
Status: DISCONTINUED | OUTPATIENT
Start: 2025-02-11 | End: 2025-02-11

## 2025-02-11 RX ORDER — THIAMINE HYDROCHLORIDE 100 MG/ML
100 INJECTION, SOLUTION INTRAMUSCULAR; INTRAVENOUS DAILY
Status: COMPLETED | OUTPATIENT
Start: 2025-02-11 | End: 2025-02-13

## 2025-02-11 RX ORDER — MULTIVIT-MIN/IRON FUM/FOLIC AC 7.5 MG-4
1 TABLET ORAL DAILY
Status: DISCONTINUED | OUTPATIENT
Start: 2025-02-11 | End: 2025-02-13 | Stop reason: HOSPADM

## 2025-02-11 RX ADMIN — ROSUVASTATIN CALCIUM 40 MG: 40 TABLET, FILM COATED ORAL at 10:12

## 2025-02-11 RX ADMIN — PIPERACILLIN SODIUM AND TAZOBACTAM SODIUM 4.5 G: 4; .5 INJECTION, SOLUTION INTRAVENOUS at 22:05

## 2025-02-11 RX ADMIN — LOSARTAN POTASSIUM 100 MG: 50 TABLET, FILM COATED ORAL at 04:37

## 2025-02-11 RX ADMIN — Medication 1 TABLET: at 10:08

## 2025-02-11 RX ADMIN — INSULIN LISPRO 6 UNITS: 100 INJECTION, SOLUTION INTRAVENOUS; SUBCUTANEOUS at 04:02

## 2025-02-11 RX ADMIN — INSULIN LISPRO 6 UNITS: 100 INJECTION, SOLUTION INTRAVENOUS; SUBCUTANEOUS at 11:17

## 2025-02-11 RX ADMIN — INSULIN GLARGINE 50 UNITS: 100 INJECTION, SOLUTION SUBCUTANEOUS at 21:09

## 2025-02-11 RX ADMIN — LABETALOL HYDROCHLORIDE 10 MG: 5 INJECTION, SOLUTION INTRAVENOUS at 05:23

## 2025-02-11 RX ADMIN — METOPROLOL TARTRATE 12.5 MG: 25 TABLET, FILM COATED ORAL at 10:07

## 2025-02-11 RX ADMIN — FOLIC ACID 1 MG: 1 TABLET ORAL at 10:01

## 2025-02-11 RX ADMIN — PIPERACILLIN SODIUM AND TAZOBACTAM SODIUM 4.5 G: 4; .5 INJECTION, SOLUTION INTRAVENOUS at 10:00

## 2025-02-11 RX ADMIN — PIPERACILLIN SODIUM AND TAZOBACTAM SODIUM 4.5 G: 4; .5 INJECTION, SOLUTION INTRAVENOUS at 16:24

## 2025-02-11 RX ADMIN — NICOTINE 1 PATCH: 21 PATCH, EXTENDED RELEASE TRANSDERMAL at 10:07

## 2025-02-11 RX ADMIN — AMLODIPINE BESYLATE 10 MG: 10 TABLET ORAL at 04:37

## 2025-02-11 RX ADMIN — THIAMINE HYDROCHLORIDE 100 MG: 100 INJECTION, SOLUTION INTRAMUSCULAR; INTRAVENOUS at 10:02

## 2025-02-11 RX ADMIN — PIPERACILLIN SODIUM AND TAZOBACTAM SODIUM 3.38 G: 3; .375 INJECTION, SOLUTION INTRAVENOUS at 01:52

## 2025-02-11 RX ADMIN — METOPROLOL TARTRATE 12.5 MG: 25 TABLET, FILM COATED ORAL at 21:09

## 2025-02-11 RX ADMIN — INSULIN LISPRO 4 UNITS: 100 INJECTION, SOLUTION INTRAVENOUS; SUBCUTANEOUS at 10:04

## 2025-02-11 RX ADMIN — INSULIN LISPRO 15 UNITS: 100 INJECTION, SOLUTION INTRAVENOUS; SUBCUTANEOUS at 19:02

## 2025-02-11 SDOH — ECONOMIC STABILITY: HOUSING INSECURITY: AT ANY TIME IN THE PAST 12 MONTHS, WERE YOU HOMELESS OR LIVING IN A SHELTER (INCLUDING NOW)?: NO

## 2025-02-11 SDOH — SOCIAL STABILITY: SOCIAL INSECURITY
WITHIN THE LAST YEAR, HAVE YOU BEEN RAPED OR FORCED TO HAVE ANY KIND OF SEXUAL ACTIVITY BY YOUR PARTNER OR EX-PARTNER?: NO

## 2025-02-11 SDOH — ECONOMIC STABILITY: FOOD INSECURITY: HOW HARD IS IT FOR YOU TO PAY FOR THE VERY BASICS LIKE FOOD, HOUSING, MEDICAL CARE, AND HEATING?: NOT HARD AT ALL

## 2025-02-11 SDOH — SOCIAL STABILITY: SOCIAL INSECURITY: WERE YOU ABLE TO COMPLETE ALL THE BEHAVIORAL HEALTH SCREENINGS?: NO

## 2025-02-11 SDOH — SOCIAL STABILITY: SOCIAL INSECURITY
WITHIN THE LAST YEAR, HAVE YOU BEEN KICKED, HIT, SLAPPED, OR OTHERWISE PHYSICALLY HURT BY YOUR PARTNER OR EX-PARTNER?: NO

## 2025-02-11 SDOH — SOCIAL STABILITY: SOCIAL INSECURITY: DO YOU FEEL ANYONE HAS EXPLOITED OR TAKEN ADVANTAGE OF YOU FINANCIALLY OR OF YOUR PERSONAL PROPERTY?: NO

## 2025-02-11 SDOH — SOCIAL STABILITY: SOCIAL INSECURITY: DOES ANYONE TRY TO KEEP YOU FROM HAVING/CONTACTING OTHER FRIENDS OR DOING THINGS OUTSIDE YOUR HOME?: NO

## 2025-02-11 SDOH — SOCIAL STABILITY: SOCIAL INSECURITY: ARE THERE ANY APPARENT SIGNS OF INJURIES/BEHAVIORS THAT COULD BE RELATED TO ABUSE/NEGLECT?: NO

## 2025-02-11 SDOH — ECONOMIC STABILITY: FOOD INSECURITY: WITHIN THE PAST 12 MONTHS, THE FOOD YOU BOUGHT JUST DIDN'T LAST AND YOU DIDN'T HAVE MONEY TO GET MORE.: NEVER TRUE

## 2025-02-11 SDOH — SOCIAL STABILITY: SOCIAL INSECURITY: HAS ANYONE EVER THREATENED TO HURT YOUR FAMILY OR YOUR PETS?: NO

## 2025-02-11 SDOH — ECONOMIC STABILITY: FOOD INSECURITY: WITHIN THE PAST 12 MONTHS, YOU WORRIED THAT YOUR FOOD WOULD RUN OUT BEFORE YOU GOT THE MONEY TO BUY MORE.: NEVER TRUE

## 2025-02-11 SDOH — ECONOMIC STABILITY: TRANSPORTATION INSECURITY: IN THE PAST 12 MONTHS, HAS LACK OF TRANSPORTATION KEPT YOU FROM MEDICAL APPOINTMENTS OR FROM GETTING MEDICATIONS?: NO

## 2025-02-11 SDOH — SOCIAL STABILITY: SOCIAL INSECURITY: WITHIN THE LAST YEAR, HAVE YOU BEEN AFRAID OF YOUR PARTNER OR EX-PARTNER?: NO

## 2025-02-11 SDOH — ECONOMIC STABILITY: HOUSING INSECURITY: IN THE LAST 12 MONTHS, WAS THERE A TIME WHEN YOU WERE NOT ABLE TO PAY THE MORTGAGE OR RENT ON TIME?: NO

## 2025-02-11 SDOH — SOCIAL STABILITY: SOCIAL INSECURITY: DO YOU FEEL UNSAFE GOING BACK TO THE PLACE WHERE YOU ARE LIVING?: NO

## 2025-02-11 SDOH — ECONOMIC STABILITY: INCOME INSECURITY: IN THE PAST 12 MONTHS HAS THE ELECTRIC, GAS, OIL, OR WATER COMPANY THREATENED TO SHUT OFF SERVICES IN YOUR HOME?: NO

## 2025-02-11 SDOH — SOCIAL STABILITY: SOCIAL INSECURITY: WITHIN THE LAST YEAR, HAVE YOU BEEN HUMILIATED OR EMOTIONALLY ABUSED IN OTHER WAYS BY YOUR PARTNER OR EX-PARTNER?: NO

## 2025-02-11 SDOH — SOCIAL STABILITY: SOCIAL INSECURITY: ABUSE: ADULT

## 2025-02-11 SDOH — ECONOMIC STABILITY: HOUSING INSECURITY: IN THE PAST 12 MONTHS, HOW MANY TIMES HAVE YOU MOVED WHERE YOU WERE LIVING?: 0

## 2025-02-11 SDOH — SOCIAL STABILITY: SOCIAL INSECURITY: ARE YOU OR HAVE YOU BEEN THREATENED OR ABUSED PHYSICALLY, EMOTIONALLY, OR SEXUALLY BY ANYONE?: NO

## 2025-02-11 SDOH — SOCIAL STABILITY: SOCIAL INSECURITY: HAVE YOU HAD ANY THOUGHTS OF HARMING ANYONE ELSE?: NO

## 2025-02-11 SDOH — SOCIAL STABILITY: SOCIAL INSECURITY: HAVE YOU HAD THOUGHTS OF HARMING ANYONE ELSE?: NO

## 2025-02-11 ASSESSMENT — LIFESTYLE VARIABLES
AUDITORY DISTURBANCES: NOT PRESENT
VISUAL DISTURBANCES: NOT PRESENT
TREMOR: NO TREMOR
TOTAL SCORE: 0
AUDIT-C TOTAL SCORE: 10
AUDITORY DISTURBANCES: NOT PRESENT
PAROXYSMAL SWEATS: NO SWEAT VISIBLE
PAROXYSMAL SWEATS: NO SWEAT VISIBLE
ANXIETY: NO ANXIETY, AT EASE
TOTAL SCORE: 0
AUDITORY DISTURBANCES: NOT PRESENT
AUDITORY DISTURBANCES: NOT PRESENT
ORIENTATION AND CLOUDING OF SENSORIUM: ORIENTED AND CAN DO SERIAL ADDITIONS
AUDITORY DISTURBANCES: NOT PRESENT
TOTAL SCORE: 0
ORIENTATION AND CLOUDING OF SENSORIUM: ORIENTED AND CAN DO SERIAL ADDITIONS
HOW OFTEN DO YOU HAVE 6 OR MORE DRINKS ON ONE OCCASION: DAILY OR ALMOST DAILY
AUDITORY DISTURBANCES: NOT PRESENT
NAUSEA AND VOMITING: NO NAUSEA AND NO VOMITING
NAUSEA AND VOMITING: NO NAUSEA AND NO VOMITING
AUDIT-C TOTAL SCORE: 10
AGITATION: NORMAL ACTIVITY
NAUSEA AND VOMITING: NO NAUSEA AND NO VOMITING
NAUSEA AND VOMITING: NO NAUSEA AND NO VOMITING
AGITATION: NORMAL ACTIVITY
TREMOR: NO TREMOR
TOTAL SCORE: 0
PAROXYSMAL SWEATS: 3
HEADACHE, FULLNESS IN HEAD: NOT PRESENT
TREMOR: NO TREMOR
VISUAL DISTURBANCES: NOT PRESENT
ANXIETY: NO ANXIETY, AT EASE
HEADACHE, FULLNESS IN HEAD: NOT PRESENT
HEADACHE, FULLNESS IN HEAD: NOT PRESENT
TREMOR: NO TREMOR
ANXIETY: NO ANXIETY, AT EASE
TOTAL SCORE: 0
ORIENTATION AND CLOUDING OF SENSORIUM: ORIENTED AND CAN DO SERIAL ADDITIONS
ORIENTATION AND CLOUDING OF SENSORIUM: ORIENTED AND CAN DO SERIAL ADDITIONS
VISUAL DISTURBANCES: NOT PRESENT
HEADACHE, FULLNESS IN HEAD: NOT PRESENT
NAUSEA AND VOMITING: NO NAUSEA AND NO VOMITING
VISUAL DISTURBANCES: NOT PRESENT
ORIENTATION AND CLOUDING OF SENSORIUM: ORIENTED AND CAN DO SERIAL ADDITIONS
AGITATION: NORMAL ACTIVITY
ANXIETY: NO ANXIETY, AT EASE
HEADACHE, FULLNESS IN HEAD: NOT PRESENT
VISUAL DISTURBANCES: NOT PRESENT
ANXIETY: NO ANXIETY, AT EASE
HOW MANY STANDARD DRINKS CONTAINING ALCOHOL DO YOU HAVE ON A TYPICAL DAY: 7 TO 9
PAROXYSMAL SWEATS: NO SWEAT VISIBLE
AUDITORY DISTURBANCES: NOT PRESENT
AGITATION: NORMAL ACTIVITY
PAROXYSMAL SWEATS: NO SWEAT VISIBLE
PAROXYSMAL SWEATS: NO SWEAT VISIBLE
NAUSEA AND VOMITING: NO NAUSEA AND NO VOMITING
HEADACHE, FULLNESS IN HEAD: NOT PRESENT
ORIENTATION AND CLOUDING OF SENSORIUM: ORIENTED AND CAN DO SERIAL ADDITIONS
TREMOR: NO TREMOR
AGITATION: NORMAL ACTIVITY
AGITATION: NORMAL ACTIVITY
HOW OFTEN DO YOU HAVE A DRINK CONTAINING ALCOHOL: 2-3 TIMES A WEEK
SKIP TO QUESTIONS 9-10: 0
PAROXYSMAL SWEATS: NO SWEAT VISIBLE
TREMOR: NO TREMOR
VISUAL DISTURBANCES: NOT PRESENT
NAUSEA AND VOMITING: NO NAUSEA AND NO VOMITING
TREMOR: NO TREMOR
ORIENTATION AND CLOUDING OF SENSORIUM: ORIENTED AND CAN DO SERIAL ADDITIONS
TOTAL SCORE: 3
PULSE: 84
TOTAL SCORE: 0
HEADACHE, FULLNESS IN HEAD: NOT PRESENT
AGITATION: NORMAL ACTIVITY
VISUAL DISTURBANCES: NOT PRESENT

## 2025-02-11 ASSESSMENT — COGNITIVE AND FUNCTIONAL STATUS - GENERAL
MOBILITY SCORE: 24
DAILY ACTIVITIY SCORE: 24
DAILY ACTIVITIY SCORE: 24
PATIENT BASELINE BEDBOUND: NO
MOBILITY SCORE: 24

## 2025-02-11 ASSESSMENT — PAIN SCALES - GENERAL
PAINLEVEL_OUTOF10: 0 - NO PAIN

## 2025-02-11 ASSESSMENT — ACTIVITIES OF DAILY LIVING (ADL)
WALKS IN HOME: INDEPENDENT
DRESSING YOURSELF: INDEPENDENT
HEARING - RIGHT EAR: FUNCTIONAL
LACK_OF_TRANSPORTATION: NO
GROOMING: INDEPENDENT
HEARING - LEFT EAR: FUNCTIONAL
ADEQUATE_TO_COMPLETE_ADL: YES
FEEDING YOURSELF: INDEPENDENT
JUDGMENT_ADEQUATE_SAFELY_COMPLETE_DAILY_ACTIVITIES: YES
TOILETING: INDEPENDENT
PATIENT'S MEMORY ADEQUATE TO SAFELY COMPLETE DAILY ACTIVITIES?: YES
BATHING: INDEPENDENT
ASSISTIVE_DEVICE: EYEGLASSES

## 2025-02-11 ASSESSMENT — PAIN - FUNCTIONAL ASSESSMENT
PAIN_FUNCTIONAL_ASSESSMENT: 0-10
PAIN_FUNCTIONAL_ASSESSMENT: 0-10

## 2025-02-11 ASSESSMENT — PATIENT HEALTH QUESTIONNAIRE - PHQ9
1. LITTLE INTEREST OR PLEASURE IN DOING THINGS: SEVERAL DAYS
SUM OF ALL RESPONSES TO PHQ9 QUESTIONS 1 & 2: 2
2. FEELING DOWN, DEPRESSED OR HOPELESS: SEVERAL DAYS

## 2025-02-11 NOTE — PROGRESS NOTES
Pharmacy Medication History Review    Elliot Earl is a 54 y.o. male admitted for Abscess of perineum. Pharmacy reviewed the patient's [id number] medications and allergies for accuracy.    Medications ADDED:  none  Medications CHANGED:  Ibuprofen   Medications REMOVED:   none     Prior to Admission Medications   Prescriptions Last Dose Informant   amLODIPine (Norvasc) 10 mg tablet Not Taking Self   Sig: Take 1 tablet (10 mg) by mouth once daily.   Patient not taking: Reported on 2/11/2025   dextran 70-hypromellose (Bion Tears) 0.1-0.3 % ophthalmic solution Not Taking Self   Sig: Administer 1 drop into both eyes 3 times a day as needed for dry eyes.   Patient not taking: Reported on 2/11/2025   ibuprofen 600 mg tablet Not Taking Self   Sig: Take 1 tablet (600 mg) by mouth every 8 hours if needed for mild pain (1 - 3) or fever (temp greater than 38.0 C).   Patient not taking: Reported on 2/11/2025   insulin glargine (Lantus Solostar U-100 Insulin) 100 unit/mL (3 mL) pen Not Taking Self   Sig: Inject 80 Units under the skin once daily at bedtime.   Patient not taking: Reported on 2/11/2025   losartan (Cozaar) 100 mg tablet Not Taking Self   Sig: Take 1 tablet (100 mg) by mouth once daily.   Patient not taking: Reported on 2/11/2025   metoprolol succinate XL (Toprol-XL) 25 mg 24 hr tablet Not Taking Self   Sig: Take 1 tablet (25 mg) by mouth once daily.   Patient not taking: Reported on 2/11/2025   rosuvastatin (Crestor) 40 mg tablet Not Taking Self   Sig: Take 1 tablet (40 mg) by mouth once daily.   Patient not taking: Reported on 2/11/2025   sitaGLIPtin phosphate (Januvia) 100 mg tablet Not Taking Self   Sig: Take 1 tablet (100 mg) by mouth once daily.   Patient not taking: Reported on 2/11/2025      Facility-Administered Medications: None            The list below reflects the updated allergy list. Please review each documented allergy for additional clarification and justification.  Allergies   "Reviewed by Cassy Huber on 2/11/2025   No Known Allergies         Patient declines M2B at discharge.     Sources:   [facility]  Pharmacy dispense history  Patient interview Poor historian  Chart Review  Care Everywhere  [facility] pharmacy (store number)#[id number], (phone number)[phone]       Additional Comments:  Patient reports he is still taking all medication on PTA list.  Patient told writer he uses 80 units of lantus as needed.   Patient reported he no longer takes ibuprofen.   [facility] pharmacy staff stated he has not filled any daily medication since 2023.  Per dispense history and interview patient is not compliant with taking his medication. Due to no fill history since 2023.      CASSY HUBER  Pharmacy Technician  02/11/25     Secure Chat preferred   If no response call [phone] or [facility] \"Med Rec\"   "

## 2025-02-11 NOTE — CARE PLAN
The patient's goals for the shift include  decrease in swellingd    The clinical goals for the shift include  BS WNL    Problem: Skin  Goal: Participates in plan/prevention/treatment measures  Outcome: Progressing  Goal: Prevent/manage excess moisture  Outcome: Progressing  Goal: Prevent/minimize sheer/friction injuries  Outcome: Progressing

## 2025-02-11 NOTE — CONSULTS
Inpatient Diabetes Education Consult    Reason for Visit:  Elliot Earl is a 54 y.o. male who presents for perineum abscess; T2DM    Consulting Service/Provider: Dr. Bruno    Visit Type: Initial visit    Visit Modality: In-person    Discharge Equipment/Supply Needs: unclear if he needs refills for BG monitoring and insulin pen       Patient has supplies at home: Blood glucose meter:  , Testing strips:  , Lancets, and Pen needles    Patient History and Assessment:  New diagnosis:  hyperglycemia 2' infection  Previous diagnosis: Type 2  Patient known to Diabetes Education department: No  Treatment prior to hospital admission: Insulin: Long acting, via pen  Blood glucose testing: Does not routinely test  Inconsistent with BG monitoring and insulin administration  Complications: cardiovascular disease, obesity, and perineum abscess  PTA Medications:    Current Outpatient Medications   Medication Instructions    amLODIPine (NORVASC) 10 mg, oral, Daily    dextran 70-hypromellose (Bion Tears) 0.1-0.3 % ophthalmic solution 1 drop, Both Eyes, 3 times daily PRN    ibuprofen 600 mg, oral, Every 8 hours PRN    Januvia 100 mg, oral, Daily    Lantus Solostar U-100 Insulin 80 Units, subcutaneous, Nightly    losartan (COZAAR) 100 mg, oral, Daily    metoprolol succinate XL (TOPROL-XL) 25 mg, oral, Daily    rosuvastatin (CRESTOR) 40 mg, oral, Daily       Glucose   Date/Time Value Ref Range Status   02/11/2025 04:48  (H) 74 - 99 mg/dL Final   02/10/2025 11:57  (H) 74 - 99 mg/dL Final   02/10/2025 01:16  (H) 74 - 99 mg/dL Final   12/03/2024 10:19  (H) 74 - 99 mg/dL Final   10/22/2024 11:52  (H) 74 - 99 mg/dL Final   10/18/2024 12:00  (H) 74 - 99 mg/dL Final   11/12/2023 09:23  (H) 74 - 99 mg/dL Final   11/11/2023 07:24  (H) 74 - 99 mg/dL Final   11/09/2023 08:29  (H) 74 - 99 mg/dL Final   11/11/2022 07:46  (H) 65 - 99 MG/DL Final     Comment:     RESULT CHECKED    "VERIPHY     10/21/2022 12:24  (H) 65 - 99 MG/DL Final   10/17/2022 03:19  (H) 65 - 99 MG/DL Final   10/15/2022 10:08  (H) 65 - 99 MG/DL Final   11/06/2021 01:46  (H) 74 - 99 mg/dL Final   08/17/2021 08:05  (H) 74 - 99 mg/dL Final   08/14/2021 01:14  (H) 74 - 99 mg/dL Final   06/10/2021 11:04  (H) 74 - 99 mg/dL Final   04/20/2021 08:02  (H) 74 - 99 mg/dL Final   03/31/2021 11:11  (H) 74 - 99 mg/dL Final     No results found for: \"CPEPTIDE\"  Hemoglobin A1C   Date Value Ref Range Status   02/10/2025 13.9 (H) See comment % Final   11/10/2023 11.6 (H) see below % Final   10/21/2022 12.0 (H) 4.0 - 6.0 % Final     Comment:     Hemoglobin A1C levels are related to mean blood glucose during the   preceding 2-3 months. The relationship table below may be used as a   general guide. Each 1% increase in HGB A1C is a reflection of an   increase in mean glucose of approximately 30 mg/dl.   Reference: Diabetes Care, volume 29, supplement 1 Jan. 2006                        HGB A1C ................. Approx. Mean Glucose   _______________________________________________   6%   ...............................  120 mg/dl   7%   ...............................  150 mg/dl   8%   ...............................  180 mg/dl   9%   ...............................  210 mg/dl   10%  ...............................  240 mg/dl  Performed at [address] [address]     03/31/2021 9.2 % Final     Comment:          Diagnosis of Diabetes-Adults   Non-Diabetic: < or = 5.6%   Increased risk for developing diabetes: 5.7-6.4%   Diagnostic of diabetes: > or = 6.5%  .       Monitoring of Diabetes                Age (y)     Therapeutic Goal (%)   Adults:          >18           <7.0   Pediatrics:    13-18           <7.5                   7-12           <8.0                   0- 6            7.5-8.5   American Diabetes Association. Diabetes Care 33(S1), Jan 2010.     10/19/2020 12.2 " % Final     Comment:          Diagnosis of Diabetes-Adults   Non-Diabetic: < or = 5.6%   Increased risk for developing diabetes: 5.7-6.4%   Diagnostic of diabetes: > or = 6.5%  .       Monitoring of Diabetes                Age (y)     Therapeutic Goal (%)   Adults:          >18           <7.0   Pediatrics:    13-18           <7.5                   7-12           <8.0                   0- 6            7.5-8.5   American Diabetes Association. Diabetes Care 33(S1), Jan 2010.         Patient Learning/Readiness Assessment:  Mr. Earl is agreeable to diabetes education visit    Interventions/Topics Covered:  See After Visit Summary for handouts/information sheets provided to patient.  Education Documentation  Insulin Dose Calculation, taught by Lisa Hilton RN at 2/11/2025  1:49 PM.  Learner: Patient  Readiness: Acceptance  Method: Explanation  Response: Needs Reinforcement  Comment: inconsistent with insulin daily -- may take it every 3-4 days    Insulin Administration, taught by Lisa Hilton RN at 2/11/2025  1:49 PM.  Learner: Patient  Readiness: Acceptance  Method: Explanation  Response: Needs Reinforcement  Comment: inconsistent with insulin daily -- may take it every 3-4 days    Insulin Types, taught by Lisa Hilton RN at 2/11/2025  1:49 PM.  Learner: Patient  Readiness: Acceptance  Method: Explanation  Response: Needs Reinforcement  Comment: inconsistent with insulin daily -- may take it every 3-4 days    Hyperglycemia, taught by Lisa Hilton RN at 2/11/2025  1:49 PM.  Learner: Patient  Readiness: Acceptance  Method: Explanation  Response: Needs Reinforcement    Monitoring Blood Glucose, taught by Lisa Hilton RN at 2/11/2025  1:49 PM.  Learner: Patient  Readiness: Acceptance  Method: Explanation  Response: Needs Reinforcement  Comment: monitors 2x week; enc to monitor 2x day    Self-Care Behaviors, taught by Lisa Hilton RN at 2/11/2025  1:48 PM.  Learner: Patient  Readiness: Acceptance  Method: Explanation  Response:  "Needs Reinforcement  Comment: last PCP appt in September; enc to attend follow up appts          Additional topics covered: Review of recent BG values.  He monitors BG 2x week.  Enc to monitor 2x day.  In the presence of illness/infection he would see elevated BG and would know to test more often.  Verbalizes understanding and agreement with this idea.  We discussed his insulin regimen.  He is inconsistent with daily basal insulin - states he takes the insulin every 3-4 days.  Education provided how the insulin works and that basal insulin should be daily at the same time so that he does have consistent coverage.  He verbalizes understanding and states, \"I should take it every day\"  Admits on days when he is feeling \"good\" he skips the insulin.    Some discussion re diet/nutrition.  States he typically eats 1 meal/day and that is dinner with wife and son.  During the day he may snack (usually popcorn) and drinks mostly water.  Reviewed benefits of balanced/healthy diet.     Additional materials provided: nutrition handout    CGM:  n/a    Education Outcome/Recommendations:        Recommendations for bedside nursing: Allow patient to self-inject insulin (supervised)    Recommendations for Providers: Follow-up w/ PCP and/or Endocrinology    Additional Comments: Mr. Earl is agreeable to continued visits.  He verbalizes some understanding re the insulin and how it works best if taken consistently.  Will follow while inpatient.  Time spent:  30 mins.         "

## 2025-02-11 NOTE — PROGRESS NOTES
[facility]  Progress Note      Patient: Elliot Earl  Age/Sex: 54 y.o., male  MRN: [id number]  Date of surgery:   Admit Date: 2/10/2025   Code Status: Full Code  Length of Stay: 0      Interval History/Overnight Events:   ORIANA, arrived in ED overnight c/f periurethral abscess   Endorses pressure, voiding independently with blood in urine. Clear yellow urine in container at bedside. Denies pus.  Glucose 247. HbA1c 14.  WBC and CR WNL  Hypertensive per chart review     Objective  No intake/output data recorded.              14.8     9.9>-----<162              44.8   136  99  11                  ----------------<247     3.8  28  0.79          Ca 9.2 Phos No results found for requested labs within last 365 days. Mg 1.97       ALT 14 AST 10 AlkPhos 64 tBili 0.6          Vitals:    02/11/25 0445 02/11/25 0515 02/11/25 0700 02/11/25 0723   BP: (!) 231/133 (!) 212/133 (!) 165/94 156/83   BP Location:       Patient Position:       Pulse: 84      Resp:       Temp:       TempSrc:       SpO2:  94% 94% 94%   Weight:       Height:              Medications:  Current Facility-Administered Medications   Medication Dose Route Frequency Provider Last Rate Last Admin    acetaminophen (Tylenol) tablet 975 mg  975 mg oral q8h PRN Ryan Davis MD MPH        amLODIPine (Norvasc) tablet 10 mg  10 mg oral Daily Ryan Davis MD MPH   10 mg at 02/11/25 0437    dextrose 50 % injection 12.5 g  12.5 g intravenous q15 min PRN Ryan Davis MD MPH        dextrose 50 % injection 25 g  25 g intravenous q15 min PRN Ryan Davis MD MPH        diazePAM (Valium) tablet 10 mg  10 mg oral q2h PRN Ryan Davis MD MPH        [Held by provider] enoxaparin (Lovenox) syringe 40 mg  40 mg subcutaneous q12h Replaced by [facility] Ryan Davis MD MPH        folic acid (Folvite) tablet 1 mg  1 mg oral Daily Ryan Davis MD MPH        glucagon (Glucagen) injection 1 mg  1  mg intramuscular q15 min PRN Ryan Davis MD MPH        glucagon (Glucagen) injection 1 mg  1 mg intramuscular q15 min PRN Ryan Davis MD MPH        insulin glargine (Lantus) injection 40 Units  40 Units subcutaneous Nightly Ryan Davis MD MPH        insulin lispro injection 0-10 Units  0-10 Units subcutaneous q4h Ryan Davis MD MPH   6 Units at 02/11/25 0402    losartan (Cozaar) tablet 100 mg  100 mg oral Daily Ryan Davis MD MPH   100 mg at 02/11/25 0437    metoprolol tartrate (Lopressor) tablet 12.5 mg  12.5 mg oral BID Ryan Davis MD MPH        multivitamin with minerals 1 tablet  1 tablet oral Daily Ryan Davis MD MPH        nicotine (Nicoderm CQ) 21 mg/24 hr patch 1 patch  1 patch transdermal Daily Ryan Davis MD MPH        piperacillin-tazobactam (Zosyn) 4.5 g in dextrose (iso)  mL  4.5 g intravenous q6h Ryan Davis MD MPH        polyethylene glycol (Glycolax, Miralax) packet 17 g  17 g oral Daily PRN Ryan Davis MD MPH        rosuvastatin (Crestor) tablet 40 mg  40 mg oral Daily Ryan Davis MD MPH        [START ON 2/14/2025] thiamine (Vitamin B-1) tablet 100 mg  100 mg oral Daily Ryan Davis MD MPH        thiamine (Vitamin B1) injection 100 mg  100 mg intravenous Daily Ryan Davis MD MPH         Current Outpatient Medications   Medication Sig Dispense Refill    amLODIPine (Norvasc) 10 mg tablet Take 1 tablet (10 mg) by mouth once daily. 30 tablet 0    dextran 70-hypromellose (Bion Tears) 0.1-0.3 % ophthalmic solution Administer 1 drop into both eyes 3 times a day as needed for dry eyes. 15 mL 0    ibuprofen 600 mg tablet Take 1 tablet (600 mg) by mouth every 8 hours if needed for mild pain (1 - 3) or fever (temp greater than 38.0 C). 30 tablet 0    insulin glargine (Lantus Solostar U-100 Insulin) 100 unit/mL (3 mL) pen  Inject 80 Units under the skin once daily at bedtime. 30 mL 0    losartan (Cozaar) 100 mg tablet Take 1 tablet (100 mg) by mouth once daily. 30 tablet 0    metoprolol succinate XL (Toprol-XL) 25 mg 24 hr tablet Take 1 tablet (25 mg) by mouth once daily. 30 tablet 0    rosuvastatin (Crestor) 40 mg tablet Take 1 tablet (40 mg) by mouth once daily. 30 tablet 0    sitaGLIPtin phosphate (Januvia) 100 mg tablet Take 1 tablet (100 mg) by mouth once daily. 30 tablet 0       Physical Exam                                                                                                                         Gen -  No acute distress     Neuro - Alert, oriented, conversant     CV -  Regular rate, hypertensive      Pulm - Symmetric chest rise, non-labored breathing on 2L NC     Abd -  Soft, obese abdomen, non-tender, non-distended      Ext - Warm, well perfused     Skin - without jaunice       Psych - appropriate tone, affect      - Meatus and penis normal without erythema, nodules, lesions, or drainage. BL testicles descended and nontender. No scrotal swelling or erythema. Minor perineal swelling with tenderness to palpation along left of midline. Area of indurance felt very deep within the left perineum but no fluctuance noted.       Imaging  CT abdomen pelvis w IV contrast    Result Date: 2/10/2025  Interpreted By:  Miryam Eid, STUDY: CT ABDOMEN PELVIS W IV CONTRAST;  2/10/2025 3:15 pm   INDICATION: Signs/Symptoms:History of abscesses.  Concern for penile abscess and abscess near buttock and rectum.     COMPARISON: 10/22/2024   ACCESSION NUMBER(S): [id number]   ORDERING CLINICIAN: COOKIE BRIGGS   TECHNIQUE: CT of the abdomen and pelvis was performed. Sagittal and coronal reconstructions were generated.  75 ML Omnipaque 350 intravenous contrast given for the exam.   FINDINGS:     ABDOMINAL ORGANS:   LIVER: Diffusely mildly hypodense/fatty infiltrated. Stable 12 mm smooth calcification along the posterior margin of  the right lobe.   GALL BLADDER AND BILIARY TREE: No calcified gallstone. No significant biliary dilatation.   SPLEEN: No focal lesion. Small isodense presumed splenule adjacent to the inferior pole.   PANCREAS: No focal lesion   ADRENALS: Isodense thickening of both adrenal glands similar to the prior exam.   KIDNEYS AND URETERS: No renal mass or hydronephrosis within limits of nephrogram phase images.   BOWEL: No abnormally dilated large or small bowel loops. nondilated retrocecal appendix.   PERITONEUM, RETROPERITONEUM, NODES: No significant free fluid. No free air. No significant retroperitoneal or mesenteric lymphadenopathy.   VESSELS:  Scattered atherosclerotic calcifications. No abdominal aortic aneurysm.   PELVIS: Mild ill-defined right lateral bladder wall thickening. Bilateral seminal vesicle calcifications typically seen in the setting of diabetes. Small calcification in the prostate. 4.6 x 1.9 cm low-density structure in the perineum extending slightly to the left of midline, inferior extent not fully included.   ABDOMINAL WALL: Small fat containing periumbilical hernia.   BONES: Multifocal presumed degenerative changes.   LOWER CHEST: Minimal linear density in the left lung base. No airspace consolidation or pleural effusion in the included areas.       4.6 cm low-density collection suspicious for abscess in the perineum, inferior extent not fully included on this exam.   Mild ill-defined thickening of the right lateral bladder wall, possible cystitis. Correlation with clinical findings and urinalysis suggested.   Diffuse fatty infiltration of the liver.   Other findings as described above.   MACRO: None.   Signed by: Miryam Eid 2/10/2025 3:38 PM Dictation workstation:   [id number]      Assessment & Plan  53 y.o. 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 Found to have a large abscess on CT.     Recommendations:   - Recommend IR percutaneous drainage. Patient not a good surgical candidate due to high risk for wound complication given HbA1c is 14. Patient also expresses preference for IR percutaneous drainage.    - Medicine admission for antibiotics and optimization glucose control   - serial bladder scans to ensure patient is emptying his bladder. Notify urology if >250cc on multiple scans  - follow Ucx  - Please page urology for any questions or concerns     Assessment and plan discussed with chief resident Dr. Soto and attending Dr Maciel Walker MD, [facility]   [facility] [place]  Adult Urology Pager: [phone]  Pediatric Urology Pager: [phone]

## 2025-02-11 NOTE — CONSULTS
Reason For Consult  Periurethral abscess    History Of Present Illness  Elliot Earl is a 54 y.o. male presenting with HTN, HLD, obesity, DM2 on insulin (last A1c 11.6) with 2 days of perineal discomfort with similar sensation of prior perineal abscesses. Today, he noticed purulent drainage from his urethra with associated dysuria, hematuria, and frequency. Denies incomplete emptying or other urinary complaints. Patient has a history of recurrent periurethral abscesses secondary to a urethral stricture s/p buccal graft urethroplasty in 2021. Denies fevers or chills, chest pain, SOB, or abdominal pain. Denies rectal pain or drainage. Denies perineal or scrotal drainage. Patient with uncontrolled diabetes and states he has not seen his endocrinologist in a while. He also endorses increased alcohol use recently.    PMHx: HTN / DM2 / HLD / obesity / proximal bulbar urethral stricture / perineal abscess   PSHx: cystoscopy, urethral dilation, periurethral abscess drainage Oct '19 / Meniscus repair May '20 / buccal graft urethroplasty  FamHx: no significant family history of  complaints, masses, hematuria or abscesses   SocHx: +alcohol and tobacco  Allergies: NKDA     Past Medical History  He has a past medical history of Acute myocardial infarction, unspecified (11/11/2020), Complex tear of medial meniscus, current injury, right knee, initial encounter (06/17/2020), Cutaneous abscess of perineum (01/04/2021), Diabetes mellitus (Multi), Effusion, right knee (09/15/2020), Encounter for other specified aftercare (05/04/2021), Low back pain, unspecified (06/20/2018), Low back pain, unspecified (01/16/2019), Other conditions influencing health status (12/17/2020), Other specified health status, Pain in left knee, Personal history of other diseases of male genital organs (07/09/2020), Radiculopathy, lumbar region (05/03/2019), and Urinary tract infection, site not specified (05/04/2021).    Surgical History  He has  "a past surgical history that includes Other surgical history (07/09/2020) and Other surgical history (07/09/2020).     Social History  He reports that he has been smoking cigarettes. He has a 20 pack-year smoking history. He does not have any smokeless tobacco history on file. He reports current alcohol use. He reports that he does not currently use drugs after having used the following drugs: Marijuana.    Family History  No family history on file.     Allergies  Patient has no known allergies.    Review of Systems  12 pt ROS reviewed and negative except as in HPI     Physical Exam  VITALS: Vital signs reviewed and within normal limits  GEN: Alert and conversant; no acute distress  HEENT: NCAT; moist mucus membranes  EYES: Sclera anicteric, EOMI  PULM: Symmetric chest rise, no increased work of breathing on room air, no respiratory distress  CV: Regular rate and rhythm  ABD: Soft, obese abdomen, non-tender, non-distended  : Meatus and penis normal without erythema, nodules, lesions, or drainage. BL testicles descended and nontender. No scrotal swelling or erythema. Minor perineal swelling with tenderness to palpation along left of midline. Area of indurance felt very deep within the left perineum but no fluctuance noted.   MSK: Moving all extremities spontaneously and to command  EXTR: No joint swelling, no significant peripheral edema  NEURO: A&O x 3, no focal neurologic deficits  PSYCH: Appropriate mood and affect     Last Recorded Vitals  Blood pressure (!) 150/103, pulse 97, temperature 37.3 °C (99.2 °F), temperature source Oral, resp. rate 14, height 1.702 m (5' 7\"), weight 127 kg (280 lb), SpO2 94%.    Relevant Results      Afeb HDS  UA neg nitrite, neg LE.   Cr 0.78  WBC 10  Glucose 256     Assessment/Plan   53 y.o. 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 Found to have a large abscess on CT.    Exam without obvious fluctuance but limited by body habitus.  Area of induration felt very deep to left of midline along the perineum, possibly rind of abscess wall but that would be difficult to incise at bedside. Patient declined and wishes to pursue conservative management.    Patient afeb and HDS, WBC 10. Abscess likely 2/2 uncontrolled hyperglycemia vs recurrent stricture. Patient currently denies voiding complaints or incomplete emptying.     Plan  - admit to medicine for IV Abx and optimization of glucose control  - no palpable area of fluctuance amenable to I&D  - can engage IR for image guided drainage in the AM  - serial bladder scans to ensure patient is emptying his bladder. Notify urology if >250cc on multiple scans  - follow Ucx  - urology to follow    DW Dr. Maciel Bob MD   [id number]

## 2025-02-11 NOTE — ED TRIAGE NOTES
Enters ED as transfer from [facility] for urology consult. Pt reports chronic dysuria with pyruria and burning sensation during urination. Pt has hx of DM, HTN, scrotal, perineal, and penile abscess. Rates pain 3/10

## 2025-02-11 NOTE — H&P
History Of Present Illness  Elliot Earl is a 54 y.o. male with PMH s/f complex urethral stricture 2/2 recurrent urethral abscesses s/p urethroplasty (04/2021), epididymal/perianal abscesses, recurrent UTIs, T2DM (last A1C 11.6, 11/2023), HTN, DLD, heavy alcohol use, tobacco use presenting as transfer from [facility] with new perineal abscess for further Urologic management.     The pt reports that his symptoms began on Saturday 2/8, when he began to notice discomfort while sitting (similar to prior abscesses) and increased urinary frequency. His discomfort progressively worsened, which made him concerned for an abscess, and he therefore presented to the [facility] ED today for evaluation.  He notes that in the ED, he began experiencing pain between his perineum and scrotum with urination, as well as noting blood and pus in the front of his underwear and in the toilet while peeing. He reports that this pain has now resolved overnight and that his last void was painless and nonbloody. He has not had any pain outside of urination.      He does have a dry cough, but reports that this began in the hospital and denies any other URI symptoms.  He also denies fevers, chills, chest pain, abdominal pain or dyspnea.  He has not had any pain with wiping himself after defecation.  He reports having nonbloody diarrhea over the past few weeks, since he increased his alcohol intake.  He reports now drinking 1 pint of vodka per day and denies precipitating factors for increased consumption besides enjoying drinking. He endorses smoking 1ppd for the past 5 to 6 years, denies any other drug use.    Additionally, he reports very inconsistent adherence to medications at home.  He takes Januvia most days, but only takes his insulin (Lantus 80 units) if his body tells him that he needs to, which is characterized by abdominal cramping and kidney pain. This occurs more often when he is drinking heavily, and his symptoms resolve  after he uses insulin.  He typically uses it once a week or so, but has not been using it over the past few weeks.  He denies polydipsia.    On arrival to [facility] ED today, the pt was HDS, with labs s/f , mild hyponatremia to 133 otherwise RFP wnl. CBCd with Hgb 15.7, WBC 10 with 76.3% PMNs 0.7% immature granulocytes, platelets 154.  UA with 4+ glucosuria, 3+ ketones, otherwise negative.  CT AP w/IV contrast revealed 4.6 cm perineal collection suspicious for abscess, concern for right lateral bladder wall thickening.  Patient was discussed with general surgery and urology, recommended transfer to [facility] for further evaluation given complex urologic history.  He was given 1 dose of IV Zosyn prior to transfer. At [facility] ED, repeat CMP and CBCd were unremarkable, BG found to be rising to 300s.  UA again sent, now with 75 LE, negative nitrates, 21-50 WBC.  Urology was consulted in ED, recommended admission to medicine for IV antibiotics and BG management, IR consult for image guided drainage in a.m. given no area of fluctuance amenable to I&D, and serial bladder scans to ensure no urinary retention.    ED Interventions:  -V Zosyn 3.375g x2    Vitals: BP (!) 150/103 (BP Location: Right arm, Patient Position: Sitting)   Pulse 97   Temp 37.3 °C (99.2 °F) (Oral)   Resp 14   Wt 127 kg (280 lb)   SpO2 94%   Labs:   Results for orders placed or performed during the hospital encounter of 02/10/25 (from the past 24 hours)   POCT GLUCOSE   Result Value Ref Range    POCT Glucose 313 (H) 74 - 99 mg/dL   CBC and Auto Differential   Result Value Ref Range    WBC 11.0 4.4 - 11.3 x10*3/uL    nRBC 0.0 0.0 - 0.0 /100 WBCs    RBC 5.88 4.50 - 5.90 x10*6/uL    Hemoglobin 15.5 13.5 - 17.5 g/dL    Hematocrit 47.5 41.0 - 52.0 %    MCV 81 80 - 100 fL    MCH 26.4 26.0 - 34.0 pg    MCHC 32.6 32.0 - 36.0 g/dL    RDW 14.5 11.5 - 14.5 %    Platelets 161 150 - 450 x10*3/uL    Neutrophils % 70.4 40.0 - 80.0 %    Immature Granulocytes %,  Automated 0.5 0.0 - 0.9 %    Lymphocytes % 19.4 13.0 - 44.0 %    Monocytes % 8.4 2.0 - 10.0 %    Eosinophils % 0.8 0.0 - 6.0 %    Basophils % 0.5 0.0 - 2.0 %    Neutrophils Absolute 7.71 (H) 1.20 - 7.70 x10*3/uL    Immature Granulocytes Absolute, Automated 0.06 0.00 - 0.70 x10*3/uL    Lymphocytes Absolute 2.12 1.20 - 4.80 x10*3/uL    Monocytes Absolute 0.92 0.10 - 1.00 x10*3/uL    Eosinophils Absolute 0.09 0.00 - 0.70 x10*3/uL    Basophils Absolute 0.05 0.00 - 0.10 x10*3/uL   Comprehensive metabolic panel   Result Value Ref Range    Glucose 284 (H) 74 - 99 mg/dL    Sodium 134 (L) 136 - 145 mmol/L    Potassium 4.4 3.5 - 5.3 mmol/L    Chloride 98 98 - 107 mmol/L    Bicarbonate 26 21 - 32 mmol/L    Anion Gap 14 10 - 20 mmol/L    Urea Nitrogen 13 6 - 23 mg/dL    Creatinine 0.86 0.50 - 1.30 mg/dL    eGFR >90 >60 mL/min/1.73m*2    Calcium 9.5 8.6 - 10.6 mg/dL    Albumin 4.3 3.4 - 5.0 g/dL    Alkaline Phosphatase 71 33 - 120 U/L    Total Protein 7.3 6.4 - 8.2 g/dL    AST 11 9 - 39 U/L    Bilirubin, Total 0.5 0.0 - 1.2 mg/dL    ALT 15 10 - 52 U/L   Urinalysis with Reflex Culture and Microscopic   Result Value Ref Range    Color, Urine Light-Yellow Light-Yellow, Yellow, Dark-Yellow    Appearance, Urine Clear Clear    Specific Gravity, Urine 1.041 (N) 1.005 - 1.035    pH, Urine 5.5 5.0, 5.5, 6.0, 6.5, 7.0, 7.5, 8.0    Protein, Urine 10 (TRACE) NEGATIVE, 10 (TRACE), 20 (TRACE) mg/dL    Glucose, Urine OVER (4+) (A) Normal mg/dL    Blood, Urine 0.2 (2+) (A) NEGATIVE mg/dL    Ketones, Urine 10 (1+) (A) NEGATIVE mg/dL    Bilirubin, Urine NEGATIVE NEGATIVE mg/dL    Urobilinogen, Urine Normal Normal mg/dL    Nitrite, Urine NEGATIVE NEGATIVE    Leukocyte Esterase, Urine 75 Alysha/uL (A) NEGATIVE   Microscopic Only, Urine   Result Value Ref Range    WBC, Urine 21-50 (A) 1-5, NONE /HPF    RBC, Urine 11-20 (A) NONE, 1-2, 3-5 /HPF    Squamous Epithelial Cells, Urine 1-9 (SPARSE) Reference range not established. /HPF    Mucus, Urine FEW  Reference range not established. /LPF      Imaging: CT abdomen pelvis w IV contrast    Result Date: 2/10/2025  Interpreted By:  Miryam Eid, STUDY: CT ABDOMEN PELVIS W IV CONTRAST;  2/10/2025 3:15 pm   INDICATION: Signs/Symptoms:History of abscesses.  Concern for penile abscess and abscess near buttock and rectum.     COMPARISON: 10/22/2024   ACCESSION NUMBER(S): [id number]   ORDERING CLINICIAN: COOKIE BRIGGS   TECHNIQUE: CT of the abdomen and pelvis was performed. Sagittal and coronal reconstructions were generated.  75 ML Omnipaque 350 intravenous contrast given for the exam.   FINDINGS:     ABDOMINAL ORGANS:   LIVER: Diffusely mildly hypodense/fatty infiltrated. Stable 12 mm smooth calcification along the posterior margin of the right lobe.   GALL BLADDER AND BILIARY TREE: No calcified gallstone. No significant biliary dilatation.   SPLEEN: No focal lesion. Small isodense presumed splenule adjacent to the inferior pole.   PANCREAS: No focal lesion   ADRENALS: Isodense thickening of both adrenal glands similar to the prior exam.   KIDNEYS AND URETERS: No renal mass or hydronephrosis within limits of nephrogram phase images.   BOWEL: No abnormally dilated large or small bowel loops. nondilated retrocecal appendix.   PERITONEUM, RETROPERITONEUM, NODES: No significant free fluid. No free air. No significant retroperitoneal or mesenteric lymphadenopathy.   VESSELS:  Scattered atherosclerotic calcifications. No abdominal aortic aneurysm.   PELVIS: Mild ill-defined right lateral bladder wall thickening. Bilateral seminal vesicle calcifications typically seen in the setting of diabetes. Small calcification in the prostate. 4.6 x 1.9 cm low-density structure in the perineum extending slightly to the left of midline, inferior extent not fully included.   ABDOMINAL WALL: Small fat containing periumbilical hernia.   BONES: Multifocal presumed degenerative changes.   LOWER CHEST: Minimal linear density in the left lung  base. No airspace consolidation or pleural effusion in the included areas.       4.6 cm low-density collection suspicious for abscess in the perineum, inferior extent not fully included on this exam.   Mild ill-defined thickening of the right lateral bladder wall, possible cystitis. Correlation with clinical findings and urinalysis suggested.   Diffuse fatty infiltration of the liver.   Other findings as described above.   MACRO: None.   Signed by: Miryam Eid 2/10/2025 3:38 PM Dictation workstation:   [id number]      Additional Relevant History:    Prior Admissions:  Per review of available records, the pt was admitted from 11/9-11/13/2023 after presenting with scrotal pain and UTI sx, found to have periurethral and L scrotal abscess on CT/scrotal US, treated with IV Vanc and Zosyn --> Bactrim on discharge total 14 day course. Urine culture was not diagnostic.  CT also demonstrated innumerable incidental lung findings c/f cavitary lesions vs nodules, ID consulted and infectious workup including AFB culture, fungal culture, cryptococcal antigen collected, TB spot, histoplasma antigen was negative, with low suspicion for TB. The patient has since presented to ED twice with UTI like sx, treated with Bactrim and Keflex (10/2024), and with IV CTX and Azithromycin (12/3/24) with discharge home and plan for outpt Urology f/u each time. STI screens (-). No outpt Urology notes visible in EMR. The patient notes that he has not had any follow-up since his urethroplasty in April 2021, because he did not have insurance for a long time.    Cardiac Hx:  Last echo:     TTE 02/2017:  CONCLUSIONS:   1. The left ventricular systolic function is normal with a 60-65% estimated ejection fraction.   2. The left atrium is normal in size.    Last cath:     [facility] 02/2017:  CONCLUSIONS:   1. Non-obstructive coronary artery disease.   2. Mildly elevated LVEDP (~18mmHg).    Home Medications:  Current Outpatient Medications   Medication  Instructions    amLODIPine (NORVASC) 10 mg, oral, Daily    dextran 70-hypromellose (Bion Tears) 0.1-0.3 % ophthalmic solution 1 drop, Both Eyes, 3 times daily PRN    ibuprofen 600 mg, oral, Every 8 hours PRN    Januvia 100 mg, oral, Daily    Lantus Solostar U-100 Insulin 80 Units, subcutaneous, Nightly    losartan (COZAAR) 100 mg, oral, Daily    metoprolol succinate XL (TOPROL-XL) 25 mg, oral, Daily    rosuvastatin (CRESTOR) 40 mg, oral, Daily     Past Medical History  He has a past medical history of Acute myocardial infarction, unspecified (11/11/2020), Complex tear of medial meniscus, current injury, right knee, initial encounter (06/17/2020), Cutaneous abscess of perineum (01/04/2021), Diabetes mellitus (Multi), Effusion, right knee (09/15/2020), Encounter for other specified aftercare (05/04/2021), Low back pain, unspecified (06/20/2018), Low back pain, unspecified (01/16/2019), Other conditions influencing health status (12/17/2020), Other specified health status, Pain in left knee, Personal history of other diseases of male genital organs (07/09/2020), Radiculopathy, lumbar region (05/03/2019), and Urinary tract infection, site not specified (05/04/2021).    Social History  He reports that he has been smoking cigarettes. He has a 20 pack-year smoking history. He does not have any smokeless tobacco history on file. He reports current alcohol use. He reports that he does not currently use drugs after having used the following drugs: Marijuana.    Surgical History  He has a past surgical history that includes Other surgical history (07/09/2020) and Other surgical history (07/09/2020).    Family History  No family history on file.     Allergies  Patient has no known allergies.    Review of Systems    All other ROS negative except as above.     Last Recorded Vitals  BP (!) 150/103 (BP Location: Right arm, Patient Position: Sitting)   Pulse 97   Temp 37.3 °C (99.2 °F) (Oral)   Resp 14   Wt 127 kg (280 lb)    SpO2 94%   Physical Exam  Constitutional:       General: He is not in acute distress.     Appearance: He is not ill-appearing.   HENT:      Head: Normocephalic.      Nose: Nose normal. No congestion or rhinorrhea.      Mouth/Throat:      Mouth: Mucous membranes are moist.      Comments: Dry cough  Eyes:      General: No scleral icterus.     Extraocular Movements: Extraocular movements intact.      Conjunctiva/sclera: Conjunctivae normal.   Cardiovascular:      Rate and Rhythm: Normal rate and regular rhythm.      Pulses: Normal pulses.      Heart sounds: Normal heart sounds. No murmur heard.     No friction rub. No gallop.   Pulmonary:      Effort: Pulmonary effort is normal. No respiratory distress.      Breath sounds: Normal breath sounds. No wheezing or rhonchi.   Abdominal:      General: Abdomen is flat.      Palpations: Abdomen is soft.      Tenderness: There is no abdominal tenderness. There is no guarding or rebound.   Genitourinary:     Testes: Normal.      Comments: Verbal consent obtained for exam, performed with ED RN chaperone. Bright red blood dripping from urethral meatus, no visible purulent discharge. No areas of visible erythema, edema or fluctuance on exam. NO TTP.   Musculoskeletal:         General: No tenderness. Normal range of motion.      Cervical back: Neck supple.      Right lower leg: No edema.      Left lower leg: No edema.   Skin:     General: Skin is warm and dry.      Capillary Refill: Capillary refill takes less than 2 seconds.      Findings: No rash.   Neurological:      General: No focal deficit present.      Mental Status: He is alert. Mental status is at baseline.      Sensory: No sensory deficit.      Motor: No weakness.      Coordination: Coordination normal.   Psychiatric:         Mood and Affect: Mood normal.         Behavior: Behavior normal.         Thought Content: Thought content normal.         Judgment: Judgment normal.         Scheduled medications  amLODIPine, 10 mg,  oral, Daily  [Held by provider] enoxaparin, 40 mg, subcutaneous, q12h SHELLY  insulin glargine, 40 Units, subcutaneous, Nightly  insulin lispro, 0-10 Units, subcutaneous, q4h  losartan, 100 mg, oral, Daily  metoprolol tartrate, 12.5 mg, oral, BID  piperacillin-tazobactam, 4.5 g, intravenous, q6h  rosuvastatin, 40 mg, oral, Daily      Continuous medications     PRN medications  PRN medications: acetaminophen, dextrose, dextrose, glucagon, glucagon, polyethylene glycol      Assessment/Plan   Elliot Earl is a 54 y.o. 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  The patient's prior urologic history is likely contributory to his recurrent symptoms however, he also likely has very inadequately controlled diabetes with infrequent use of insulin and medications at home, creating mildly immunocompromised state, further increasing risk. Will also retest for STI. At this time, the patient is hemodynamically stable, reports significant symptom improvement since initiation of IV antibiotics overnight.  The patient declined I&D with urology and reports that he would like to trial course of IV antibiotics prior to abscess drainage, unless necessary.  Will increase dosing of IV Zosyn as single agent regimen for community-acquired perineal abscess given that patient's poorly controlled diabetes may be a risk factor for higher risk infection, will maintain n.p.o. at midnight and hold DVT prophylaxis in case of need for more urgent drainage.  Patient would likely benefit from more intensive diabetes education and optimization during admission.    #perineal abscess  #urethral purulent discharge and bleeding  ::extensive Urologic hx including complex  urethral stricture 2/2 recurrent urethral abscesses s/p urethroplasty (04/2021), epididymal/perianal abscesses, recurrent UTIs  ::no outpt Urology follow up in EMR, pt reports due to loss of insurance    Plan:  -Urology following, appreciate recs  -will c/w IV Zosyn q6H (2/10-)  -consider IR consult in AM for possible image-guided drainage if pt amenable  -gram stain, culture of drainage  -will f/u Ucx  -added on urine GC/CT, trichmonas  -add on HIV PCR  -swab any visible urethral discharge  -Outpt Urology on DC    #T2DM  ::last A1C 11.6, 11/2023  ::pt reports inconsistent medication use at home    Plan:  -f/u A1C  -hold home Januvia  -Will initiate NPO SSI #2, half home lantus 80U --> 40U nightly while NPO, will uptitrate as need  -Diabetes and diabetes educator consult for optimization and to establish care for outpt f/u    #heavy alcohol use  #tobacco use  ::reports 1 pint vodka daily, 1ppd cigaretts  -CIWA protocol  -will offer nicotine patch    #Chronic Medical Conditions  -HTN: c/h amlodipine 10mg daily, losartan 100mg daily; will convert metop succ 25mg --> metop tartrate 12.5mg BID  -HLD: c/h rosuvastatin 40mg daily  -c/f pulmonary nodules: outpt ID follow up    F: Replete PRN  E: Replete PRN  N: NPO@MN in case of intervention  DVT Ppx: Lovenox q12H (wt based dosing) HELD in case of procedure  GI Ppx: Pantoprazole   Access/Lines: PIV  Abx: Zosyn  O2: None    Pain regimen: Tylenol   GI Laxative: Miralax     Code status: Full Code (confirmed on admission)  NOK: Wife, Purvi Earl     This patient was admitted after daytime hours and will be seen and staffed by [facility] Team Attending Physician in AM.    Emily Davis MD, MPH  PGY-3, Internal Medicine and Pediatrics Resident Physician  [facility]  Voice recognition software was used to dictate this note. Please excuse any errors.

## 2025-02-11 NOTE — HOSPITAL COURSE
Mr. Earl is 54 y.o. gentleman with hx of IDDM type II with inconsistent insulin use, HTN with medication non adherence, DLD, heavy EtOH consumption, and tobacco use, hx of urethral stricture s/p urethroplasty, hx of urethral, scrotal, perineal abscesses s/p drainage who was transferred from [facility] to [facility] with new perineal abscess discovered on CT A/P.  Pt was afebrile, without leukocytosis on presentation.   Zosyn was initiated, urethral and urine cultures obtained.  Urine cultures were negative. Urethral culture was still in progress at the time of pt's discharge.  IR was consulted for possible drainage of the abscess, and after waiting on the schedule for 2 days, pt elected to be discharged home on oral antibiotics.  Of note, pt was non adherent to his antihyperglycemic regimen (was taking insulin when felt like needed ir/felt unwell from hyperglycemia), presented with BG of 256 and A1C of 13.9. Diabetic educator was consulted, and pt's insulin was restarted.  His BP medications were also restarted in the hospital.   Pt was discharged on 2/13/25 with 30 day supply of Augmentin with instruction to follow up with PCP, ID, and Urology.    Of note, pt has hx of CT scan chest showing pulmonary nodules c/f malignancy and nodular appearing liver c/f cirrhosis, recommended to follow up with PCP for monitoring and further investigation.  Stool occult blood was negative on this admission.

## 2025-02-11 NOTE — ED PROVIDER NOTES
History of Present Illness     History provided by: Patient  Limitations to History: None  External Records Reviewed with Brief Summary: Discharge Summary from [facility] emergency department, 2/10/25, which showed patient had CT imaging which showed a 4.6 cm low density collection suspicious for abscess in the perineum and mild ill-defined thickening of the right lateral bladder wall. Patient had labs obtained at the outside hospital which showed a largely unremarkable BMP with kidney function within normal limits. He did not have a leukocytosis in his hemoglobin was within normal limits. His blood glucose was noted to be elevated at 256 at this time.    HPI:  Elliot Earl is a 54 y.o. male with insulin-dependent type II diabetes presenting as a transfer from outside hospital for an abscess in the perineum. Patient was evaluated at outside hospital with CT imaging and labs noted above. He was transferred to [facility] for further evaluation by urology. Patient states that he is had symptoms for the past two days with increasing pain of the perineum, burning with urination and blood in the urine. He denies fever, cough, chest pain, shortness of breath, abdominal pain.      Physical Exam   Triage vitals:  T 37 °C (98.6 °F)  HR 97  /88  RR 18  O2 95 % None (Room air)    General: Awake, alert, in no acute distress  Eyes: Gaze conjugate. EOMI. No scleral icterus or injection.   HENT: Normo-cephalic, atraumatic. No stridor.   CV: Regular rate, regular rhythm.   Resp: Breathing non-labored, speaking in full sentences.  Clear to auscultation bilaterally. No wheezing, rales, rhonchi.   GI: Soft, non-distended, non-tender. No rebound or guarding.   : No fluctuant or indurated mass of the perineum. No active drainage. Penis with no lesions or abnormal drainage. Blood noted in patient's underwear.  MSK/Extremities: No gross bony deformities. Moving all extremities. No edema.   Skin: Warm. Appropriate  color.         Medical Decision Making & ED Course   Medical Decision Makin y.o. male with history of type II diabetes and recurrent abscesses of the perineum presenting as a transfer from outside hospital for annmarie-urethral abscess. On exam, patient is non-toxic appearing and hemodynamically stable. His  exam is benign with no erythema, indurated or fluctuant mass. Low clinical concern for lisha's or sepsis based on physical exam, patient's overall non toxic appearance and labs obtained at outside hospital. Perirectal vs perineal vs periurethral abscess is high on differential. Cystitis is also on differential given patient's dysuria symptoms. He had labs at the outside hospital with unremarkable BMP, no leukocytosis or anemia. Will repeat basic labs here in addition to a UA as patient is endorsing dysuria. Patient also received a dose of Zosyn at outside hospital prior to arrival. Urology has been consulted for further recommendations. Patient not currently endorsing pain, will provide tylenol if needed.   Urology recommended admission for IV antibiotics and possible IR intervention but are not recommending any current surgical intervention.   Patient was discussed with the Admission Coordinators who accepted the patient for admission to the hospital.  UA is pending at time of admission.       ED Course:  ED Course as of 25 0127   Tue 2025   0031 Comprehensive metabolic panel [AC]   0042 No leukocytosis, no anemia. [AC]   0042 POCT Glucose(!): 313 [AC]   0115 CMP notable for hyperglycemia, but otherwise within normal limits. Kidney function within normal limits. No electrolyte abnormalities.  [AC]   0125 UA notable for 75 leukocytes, 4+ glucose, 2+ blood. Was also noted to have WBCs, likely consistent with a UTI. Patient is already being treated with Zosyn, will continue Q6 dosing at this time. [AC]      ED Course User Index  [AC] Teofilo Cuello, DO         Diagnoses as of 25 012    Abscess of perineum       EKG Independent Interpretation: EKG not obtained      The patient was discussed with the following consultants/services:  urology  ----      Independent Result Review and Interpretation: Relevant laboratory and radiographic results were reviewed and independently interpreted by myself.  As necessary, they are commented on in the ED Course.    Chronic conditions affecting the patient's care: As documented above in MDM      Care Considerations: As documented above in MDM      Disposition   As a result of their workup, the patient will require admission to the hospital.  The patient was informed of his diagnosis.  The patient was given the opportunity to ask questions and I answered them. The patient agreed to be admitted to the hospital.     Procedures   Procedures    This was a shared visit with an ED attending.  The patient was seen and discussed with the ED attending    Teofilo Cuello DO  Emergency Medicine PGY1     Teofilo Cuello DO  Resident  02/11/25 0117       Teofilo Cuello DO  Resident  02/11/25 0127

## 2025-02-11 NOTE — PROGRESS NOTES
Elliot Earl is a 54 y.o. male on day 0 of admission presenting with Abscess of perineum.      Subjective   Pt is seen by the bedside. C/o pain with urination and perineal discomfort, otherwise no complaints.  Reports recurrent urethral abscesses started approximately 6 years ago necessitating draining multiple times. Pt has undergone urethroplasty with buccal mucosal graft for urethral stricture with Dr. Quiroz in 2021.    Denies fevers, chills, night sweats, cough, sore throat, diarrhea, N/V.      Objective     Last Recorded Vitals  BP (!) 122/91   Pulse 91   Temp 37.3 °C (99.2 °F) (Oral)   Resp 16   Wt 127 kg (280 lb)   SpO2 98%   Intake/Output last 3 Shifts:  No intake or output data in the 24 hours ending 02/11/25 1047    Admission Weight  Weight: 127 kg (280 lb) (02/10/25 2139)    Daily Weight  02/10/25 : 127 kg (280 lb)    Image Results  CT abdomen pelvis w IV contrast  Narrative: Interpreted By:  Miryam Eid,   STUDY:  CT ABDOMEN PELVIS W IV CONTRAST;  2/10/2025 3:15 pm      INDICATION:  Signs/Symptoms:History of abscesses.  Concern for penile abscess and  abscess near buttock and rectum.          COMPARISON:  10/22/2024      ACCESSION NUMBER(S):  [id number]      ORDERING CLINICIAN:  COOKIE BRIGGS      TECHNIQUE:  CT of the abdomen and pelvis was performed. Sagittal and coronal  reconstructions were generated.  75 ML Omnipaque 350 intravenous  contrast given for the exam.      FINDINGS:          ABDOMINAL ORGANS:      LIVER: Diffusely mildly hypodense/fatty infiltrated. Stable 12 mm  smooth calcification along the posterior margin of the right lobe.      GALL BLADDER AND BILIARY TREE: No calcified gallstone. No significant  biliary dilatation.      SPLEEN: No focal lesion. Small isodense presumed splenule adjacent to  the inferior pole.      PANCREAS: No focal lesion      ADRENALS: Isodense thickening of both adrenal glands similar to the  prior exam.      KIDNEYS AND URETERS: No renal  mass or hydronephrosis within limits of  nephrogram phase images.      BOWEL: No abnormally dilated large or small bowel loops. nondilated  retrocecal appendix.      PERITONEUM, RETROPERITONEUM, NODES: No significant free fluid. No  free air. No significant retroperitoneal or mesenteric  lymphadenopathy.      VESSELS:  Scattered atherosclerotic calcifications. No abdominal  aortic aneurysm.      PELVIS: Mild ill-defined right lateral bladder wall thickening.  Bilateral seminal vesicle calcifications typically seen in the  setting of diabetes. Small calcification in the prostate. 4.6 x 1.9  cm low-density structure in the perineum extending slightly to the  left of midline, inferior extent not fully included.      ABDOMINAL WALL: Small fat containing periumbilical hernia.      BONES: Multifocal presumed degenerative changes.      LOWER CHEST: Minimal linear density in the left lung base. No  airspace consolidation or pleural effusion in the included areas.      Impression: 4.6 cm low-density collection suspicious for abscess in the perineum,  inferior extent not fully included on this exam.      Mild ill-defined thickening of the right lateral bladder wall,  possible cystitis. Correlation with clinical findings and urinalysis  suggested.      Diffuse fatty infiltration of the liver.      Other findings as described above.      MACRO:  None.      Signed by: Miryam Eid 2/10/2025 3:38 PM  Dictation workstation:   [id number]      Physical Exam  Constitutional:       General: He is not in acute distress.     Appearance: He is not ill-appearing.   HENT:      Head: Normocephalic.      Nose: Nose normal. No congestion or rhinorrhea.     Eyes:      General: No scleral icterus. Bilateral scleral injection present.      Extraocular Movements: Extraocular movements intact.   Cardiovascular:      Rate and Rhythm: Normal rate and regular rhythm.      Heart sounds: Normal heart sounds. No murmur heard. No friction rub. No  gallop.   Pulmonary:      Effort: Pulmonary effort is normal. No respiratory distress.      Breath sounds: Normal breath sounds. No wheezing or rhonchi.   Abdominal:      General: Abdomen is protuberant.     Palpations: Abdomen is soft.      Tenderness: There is no abdominal tenderness. There is no guarding or rebound.   Musculoskeletal:         Right lower leg: No edema.      Left lower leg: No edema.   Skin:     General: Skin is warm and dry.   Neurological:      General: No focal deficit present.      Mental Status: He is alert. Mental status is at baseline.      Sensory: No sensory deficit.      Motor: No weakness.      Coordination: Coordination normal.   Psychiatric:         Mood and Affect: Mood normal.         Behavior: Behavior normal.         Thought Content: Thought content normal.         Judgment: Judgment normal.       Assessment/Plan      54 y.o. gentleman with complex urologic hx including recurrent UTIs, urethral stricture s/p buccal mucosal graft in 2021, urethral, perianal, scrotal, and epididymal abscesses s/p multiple drainages presenting with new perineal abscess and possible UTI.  Pt's PMHx also significant for uncontrolled DM with inconsistent insulin use, HTN with medication non adherence, DLD, heavy EtOH consumption, and tobacco use.  Pt is HD stable, w/o signs of sepsis.    Perineal abscess  Possible UTI  - continue Zosyn (2/10-)  - follow urethral and urine cultures   - follow STI workup   - IR consulted for abscess drainage eval  - pt will require OP Urology follow-up    IDDM type II  - A1C 13.9 on admission  - Home regimen: glargine 80 units at bedtime, Januvia 100 mg daily with poor adherence  - held home Januvia  - increased glargine 40 -> 50 units at bedtime +  SSI #2  - consulted Diabetic educator  - carb controlled diet    HTN  HLD  - SBP in 220's on admission, improved with single dose of IV labetalol  - restarted home amlodipine, cozaar, metoprolol tartrate (switched from home  succinate while admitted), crestor.    Heavy EtOH use  Tobacco use  - reports 1 pint vodka daily, 1ppd cigaretts  -CIWA protocol  -will offer nicotine patch    Hx of Pulm nodules   - CT scan from 11/10/23 demonstrated numerous subcentimeter pulmonary nodules measuring up to 0.7 cm. Some solid, and some demonstrating central  Cavitation, raising c/f metastatic disease.   At that time, ID was consulted and infectious workup including AFB culture, fungal culture, cryptococcal antigen collected, TB spot, histoplasma antigen was negative, with low suspicion for TB.     Social work  - requested assistance of  with helping pt obtaining medical insurance      F: Replete PRN  E: Replete PRN  N: NPO@MN in case of intervention  DVT Ppx: Lovenox q12H (wt based dosing) HELD in case of procedure, SCDs  GI Ppx: Pantoprazole   Access/Lines: PIV  Abx: Zosyn  O2: None     Pain regimen: Tylenol   GI Laxative: Miralax      Code status: Full Code (confirmed on admission)  NOK: Wife, Purvi Earl        Mir Bruno MD  Anesthesiology resident, PGY-1

## 2025-02-11 NOTE — PROGRESS NOTES
ED SOCIAL WORK NOTE:  Elliot Earl is a 54 y.o. male on day 0 of admission presenting with Abscess of perineum.    SW notified by ID team/ED providers that patient has several chronic health issues and is reporting no insurance coverage. SW sent patient's information to [facility] via email, who will assess and assist patient at bedside. If patient qualifies/needs assistance with Medicaid, has Medicare, or private insurance they will assist him to identify barriers to move forward with his health care. SW team to follow for safe dispo planning.    COURT ZUÑIGA

## 2025-02-12 LAB
GLUCOSE BLD MANUAL STRIP-MCNC: 145 MG/DL (ref 74–99)
GLUCOSE BLD MANUAL STRIP-MCNC: 226 MG/DL (ref 74–99)
GLUCOSE BLD MANUAL STRIP-MCNC: 247 MG/DL (ref 74–99)
GLUCOSE BLD MANUAL STRIP-MCNC: 343 MG/DL (ref 74–99)
HIV1 RNA # PLAS NAA DL=20: NOT DETECTED {COPIES}/ML
HIV1 RNA SPEC NAA+PROBE-LOG#: NORMAL {LOG_COPIES}/ML
PATH REV-IMMUNOHEMATOLOGY-PR30: NORMAL

## 2025-02-12 PROCEDURE — 99233 SBSQ HOSP IP/OBS HIGH 50: CPT

## 2025-02-12 PROCEDURE — 82270 OCCULT BLOOD FECES: CPT

## 2025-02-12 PROCEDURE — 1210000001 HC SEMI-PRIVATE ROOM DAILY

## 2025-02-12 PROCEDURE — 2500000002 HC RX 250 W HCPCS SELF ADMINISTERED DRUGS (ALT 637 FOR MEDICARE OP, ALT 636 FOR OP/ED): Mod: SE

## 2025-02-12 PROCEDURE — 2500000001 HC RX 250 WO HCPCS SELF ADMINISTERED DRUGS (ALT 637 FOR MEDICARE OP): Mod: SE

## 2025-02-12 PROCEDURE — 82947 ASSAY GLUCOSE BLOOD QUANT: CPT

## 2025-02-12 PROCEDURE — 2500000004 HC RX 250 GENERAL PHARMACY W/ HCPCS (ALT 636 FOR OP/ED): Mod: SE

## 2025-02-12 RX ORDER — INSULIN LISPRO 100 [IU]/ML
6 INJECTION, SOLUTION INTRAVENOUS; SUBCUTANEOUS
Status: DISCONTINUED | OUTPATIENT
Start: 2025-02-12 | End: 2025-02-13 | Stop reason: HOSPADM

## 2025-02-12 RX ORDER — DEXTROSE 50 % IN WATER (D50W) INTRAVENOUS SYRINGE
12.5
Status: DISCONTINUED | OUTPATIENT
Start: 2025-02-12 | End: 2025-02-13 | Stop reason: HOSPADM

## 2025-02-12 RX ORDER — INSULIN LISPRO 100 [IU]/ML
0-10 INJECTION, SOLUTION INTRAVENOUS; SUBCUTANEOUS
Status: DISCONTINUED | OUTPATIENT
Start: 2025-02-12 | End: 2025-02-13 | Stop reason: HOSPADM

## 2025-02-12 RX ORDER — DEXTROSE 50 % IN WATER (D50W) INTRAVENOUS SYRINGE
25
Status: DISCONTINUED | OUTPATIENT
Start: 2025-02-12 | End: 2025-02-13 | Stop reason: HOSPADM

## 2025-02-12 RX ADMIN — INSULIN LISPRO 4 UNITS: 100 INJECTION, SOLUTION INTRAVENOUS; SUBCUTANEOUS at 15:40

## 2025-02-12 RX ADMIN — PIPERACILLIN SODIUM AND TAZOBACTAM SODIUM 4.5 G: 4; .5 INJECTION, SOLUTION INTRAVENOUS at 09:25

## 2025-02-12 RX ADMIN — INSULIN LISPRO 6 UNITS: 100 INJECTION, SOLUTION INTRAVENOUS; SUBCUTANEOUS at 15:40

## 2025-02-12 RX ADMIN — PIPERACILLIN SODIUM AND TAZOBACTAM SODIUM 4.5 G: 4; .5 INJECTION, SOLUTION INTRAVENOUS at 04:50

## 2025-02-12 RX ADMIN — INSULIN GLARGINE 50 UNITS: 100 INJECTION, SOLUTION SUBCUTANEOUS at 20:33

## 2025-02-12 RX ADMIN — ROSUVASTATIN CALCIUM 40 MG: 40 TABLET, FILM COATED ORAL at 09:28

## 2025-02-12 RX ADMIN — FOLIC ACID 1 MG: 1 TABLET ORAL at 09:23

## 2025-02-12 RX ADMIN — PIPERACILLIN SODIUM AND TAZOBACTAM SODIUM 4.5 G: 4; .5 INJECTION, SOLUTION INTRAVENOUS at 15:39

## 2025-02-12 RX ADMIN — LOSARTAN POTASSIUM 100 MG: 50 TABLET, FILM COATED ORAL at 09:23

## 2025-02-12 RX ADMIN — PIPERACILLIN SODIUM AND TAZOBACTAM SODIUM 4.5 G: 4; .5 INJECTION, SOLUTION INTRAVENOUS at 21:43

## 2025-02-12 RX ADMIN — THIAMINE HYDROCHLORIDE 100 MG: 100 INJECTION, SOLUTION INTRAMUSCULAR; INTRAVENOUS at 09:25

## 2025-02-12 RX ADMIN — AMLODIPINE BESYLATE 10 MG: 10 TABLET ORAL at 09:25

## 2025-02-12 RX ADMIN — Medication 1 TABLET: at 09:23

## 2025-02-12 RX ADMIN — METOPROLOL TARTRATE 12.5 MG: 25 TABLET, FILM COATED ORAL at 09:23

## 2025-02-12 RX ADMIN — METOPROLOL TARTRATE 12.5 MG: 25 TABLET, FILM COATED ORAL at 20:33

## 2025-02-12 ASSESSMENT — LIFESTYLE VARIABLES
PAROXYSMAL SWEATS: NO SWEAT VISIBLE
TOTAL SCORE: 0
AUDITORY DISTURBANCES: NOT PRESENT
ORIENTATION AND CLOUDING OF SENSORIUM: ORIENTED AND CAN DO SERIAL ADDITIONS
TREMOR: NO TREMOR
ORIENTATION AND CLOUDING OF SENSORIUM: ORIENTED AND CAN DO SERIAL ADDITIONS
VISUAL DISTURBANCES: NOT PRESENT
TREMOR: NO TREMOR
VISUAL DISTURBANCES: NOT PRESENT
NAUSEA AND VOMITING: NO NAUSEA AND NO VOMITING
PAROXYSMAL SWEATS: NO SWEAT VISIBLE
ANXIETY: NO ANXIETY, AT EASE
ORIENTATION AND CLOUDING OF SENSORIUM: ORIENTED AND CAN DO SERIAL ADDITIONS
NAUSEA AND VOMITING: NO NAUSEA AND NO VOMITING
TREMOR: NO TREMOR
AUDITORY DISTURBANCES: NOT PRESENT
ORIENTATION AND CLOUDING OF SENSORIUM: ORIENTED AND CAN DO SERIAL ADDITIONS
AGITATION: NORMAL ACTIVITY
AGITATION: SOMEWHAT MORE THAN NORMAL ACTIVITY
VISUAL DISTURBANCES: NOT PRESENT
HEADACHE, FULLNESS IN HEAD: NOT PRESENT
AGITATION: NORMAL ACTIVITY
HEADACHE, FULLNESS IN HEAD: NOT PRESENT
TREMOR: NO TREMOR
PAROXYSMAL SWEATS: NO SWEAT VISIBLE
AUDITORY DISTURBANCES: NOT PRESENT
AGITATION: NORMAL ACTIVITY
AUDITORY DISTURBANCES: NOT PRESENT
ANXIETY: NO ANXIETY, AT EASE
VISUAL DISTURBANCES: NOT PRESENT
VISUAL DISTURBANCES: NOT PRESENT
PAROXYSMAL SWEATS: NO SWEAT VISIBLE
ANXIETY: NO ANXIETY, AT EASE
PAROXYSMAL SWEATS: NO SWEAT VISIBLE
TOTAL SCORE: 0
TOTAL SCORE: 0
ORIENTATION AND CLOUDING OF SENSORIUM: ORIENTED AND CAN DO SERIAL ADDITIONS
TOTAL SCORE: 1
TREMOR: NO TREMOR
HEADACHE, FULLNESS IN HEAD: NOT PRESENT
AGITATION: NORMAL ACTIVITY
NAUSEA AND VOMITING: NO NAUSEA AND NO VOMITING
AUDITORY DISTURBANCES: NOT PRESENT
HEADACHE, FULLNESS IN HEAD: NOT PRESENT
NAUSEA AND VOMITING: NO NAUSEA AND NO VOMITING
TOTAL SCORE: 0
ANXIETY: NO ANXIETY, AT EASE
HEADACHE, FULLNESS IN HEAD: NOT PRESENT
ANXIETY: NO ANXIETY, AT EASE
NAUSEA AND VOMITING: NO NAUSEA AND NO VOMITING

## 2025-02-12 ASSESSMENT — COGNITIVE AND FUNCTIONAL STATUS - GENERAL
MOBILITY SCORE: 24
DAILY ACTIVITIY SCORE: 24

## 2025-02-12 ASSESSMENT — ACTIVITIES OF DAILY LIVING (ADL): LACK_OF_TRANSPORTATION: NO

## 2025-02-12 ASSESSMENT — PAIN - FUNCTIONAL ASSESSMENT
PAIN_FUNCTIONAL_ASSESSMENT: 0-10
PAIN_FUNCTIONAL_ASSESSMENT: 0-10

## 2025-02-12 ASSESSMENT — PAIN SCALES - GENERAL
PAINLEVEL_OUTOF10: 0 - NO PAIN
PAINLEVEL_OUTOF10: 0 - NO PAIN

## 2025-02-12 NOTE — CARE PLAN
The patient's goals for the shift include prepare for procedure tomorrow.    The clinical goals for the shift include no signs of ETOH withdrawal.    Over the shift, the patient did not make progress toward the following goals. Barriers to progression include n/a. Recommendations to address these barriers include n/a.

## 2025-02-12 NOTE — PROGRESS NOTES
Elliot Earl is a 54 y.o. male on day 1 of admission presenting with Abscess of perineum.      Subjective   Pt is seen by the bedside.   Reports improvement in pain. No complains.  Denies fevers, chills, night sweats, cough, sore throat, diarrhea, N/V.      Objective     Last Recorded Vitals  /88   Pulse 78   Temp 36.5 °C (97.7 °F)   Resp 20   Wt 127 kg (280 lb)   SpO2 94%   Intake/Output last 3 Shifts:    Intake/Output Summary (Last 24 hours) at 2/12/2025 1138  Last data filed at 2/12/2025 0547  Gross per 24 hour   Intake 200 ml   Output --   Net 200 ml       Admission Weight  Weight: 127 kg (280 lb) (02/10/25 2139)    Daily Weight  02/10/25 : 127 kg (280 lb)    Image Results  CT abdomen pelvis w IV contrast  Narrative: Interpreted By:  Miryam Eid,   STUDY:  CT ABDOMEN PELVIS W IV CONTRAST;  2/10/2025 3:15 pm      INDICATION:  Signs/Symptoms:History of abscesses.  Concern for penile abscess and  abscess near buttock and rectum.          COMPARISON:  10/22/2024      ACCESSION NUMBER(S):  [id number]      ORDERING CLINICIAN:  COOKIE BRIGGS      TECHNIQUE:  CT of the abdomen and pelvis was performed. Sagittal and coronal  reconstructions were generated.  75 ML Omnipaque 350 intravenous  contrast given for the exam.      FINDINGS:          ABDOMINAL ORGANS:      LIVER: Diffusely mildly hypodense/fatty infiltrated. Stable 12 mm  smooth calcification along the posterior margin of the right lobe.      GALL BLADDER AND BILIARY TREE: No calcified gallstone. No significant  biliary dilatation.      SPLEEN: No focal lesion. Small isodense presumed splenule adjacent to  the inferior pole.      PANCREAS: No focal lesion      ADRENALS: Isodense thickening of both adrenal glands similar to the  prior exam.      KIDNEYS AND URETERS: No renal mass or hydronephrosis within limits of  nephrogram phase images.      BOWEL: No abnormally dilated large or small bowel loops. nondilated  retrocecal appendix.       PERITONEUM, RETROPERITONEUM, NODES: No significant free fluid. No  free air. No significant retroperitoneal or mesenteric  lymphadenopathy.      VESSELS:  Scattered atherosclerotic calcifications. No abdominal  aortic aneurysm.      PELVIS: Mild ill-defined right lateral bladder wall thickening.  Bilateral seminal vesicle calcifications typically seen in the  setting of diabetes. Small calcification in the prostate. 4.6 x 1.9  cm low-density structure in the perineum extending slightly to the  left of midline, inferior extent not fully included.      ABDOMINAL WALL: Small fat containing periumbilical hernia.      BONES: Multifocal presumed degenerative changes.      LOWER CHEST: Minimal linear density in the left lung base. No  airspace consolidation or pleural effusion in the included areas.      Impression: 4.6 cm low-density collection suspicious for abscess in the perineum,  inferior extent not fully included on this exam.      Mild ill-defined thickening of the right lateral bladder wall,  possible cystitis. Correlation with clinical findings and urinalysis  suggested.      Diffuse fatty infiltration of the liver.      Other findings as described above.      MACRO:  None.      Signed by: Miryam Eid 2/10/2025 3:38 PM  Dictation workstation:   [id number]      Physical Exam  Constitutional:       General: He is not in acute distress.     Appearance: He is not ill-appearing.   HENT:      Head: Normocephalic.      Nose: Nose normal. No congestion or rhinorrhea.     Eyes:      General: No scleral icterus. Bilateral scleral injection present.      Extraocular Movements: Extraocular movements intact.   Cardiovascular:      Rate and Rhythm: Normal rate and regular rhythm.      Heart sounds: Normal heart sounds. No murmur heard. No friction rub. No gallop.   Pulmonary:      Effort: Pulmonary effort is normal. No respiratory distress.      Breath sounds: Normal breath sounds. No wheezing or rhonchi.   Abdominal:       General: Abdomen is protuberant.     Palpations: Abdomen is soft.      Tenderness: There is no abdominal tenderness. There is no guarding or rebound.   Musculoskeletal:         Right lower leg: No edema.      Left lower leg: No edema.   Skin:     General: Skin is warm and dry.   Neurological:      General: No focal deficit present.      Mental Status: He is alert. Mental status is at baseline.      Sensory: No sensory deficit.      Motor: No weakness.      Coordination: Coordination normal.   Psychiatric:         Mood and Affect: Mood normal.         Behavior: Behavior normal.         Thought Content: Thought content normal.         Judgment: Judgment normal.       Assessment/Plan      54 y.o. gentleman with complex urologic hx including recurrent UTIs, urethral stricture s/p buccal mucosal graft in 2021, urethral, perianal, scrotal, and epididymal abscesses s/p multiple drainages presenting with new perineal abscess.  Pt's PMHx also significant for uncontrolled DM with inconsistent insulin use, HTN with medication non adherence, DLD, heavy EtOH consumption, and tobacco use.  Pt is HD stable, w/o signs of sepsis. Urine Cx negative.    Perineal abscess  - Urine Cx: No growth  - Trichomonas, Neisseria, Chlamydia negative; HIV pending  - continue Zosyn (2/10-), will discharge on Augmentin  - follow urethral cultures   - IR consulted for abscess drainage eval, awaiting decision whether will proceed with percutaneous drainage  - pt will require OP Urology follow-up    IDDM type II  - A1C 13.9 on admission  - Home regimen: glargine 80 units at bedtime, Januvia 100 mg daily with poor adherence  - continue glargine 50 units at bedtime + started 6 units Lispro AC + SSI #2  - Diabetic educator following  - carb controlled diet    HTN  HLD  - SBP in 220's on admission, improved with single dose of IV labetalol  - restarted home amlodipine, cozaar, metoprolol tartrate (switched from home succinate while admitted),  crestor.    Heavy EtOH use  Tobacco use  - reports 1 pint vodka daily, 1ppd cigaretts  -CIWA protocol, no evidence of withdrawal    Hx of Pulm nodules   - CT scan from 11/10/23 demonstrated numerous subcentimeter pulmonary nodules measuring up to 0.7 cm. Some solid, and some demonstrating central  Cavitation, raising c/f metastatic disease.   At that time, ID was consulted and infectious workup including AFB culture, fungal culture, cryptococcal antigen collected, TB spot, histoplasma antigen was negative, with low suspicion for TB.   - no colonoscopy on file  - ordered stool occult blood  - PCP follow up OP  - advise smoking cessation    Hx of nodular liver contour and hepatic atrophy c/f cirrhosis iso heavy alcohol consumption  - liver enzymes wnl   - previously, GI follow up was recommended  - will schedule follow up with GI OP  - advise EtOH cessation    Social work  - requested assistance of  with helping pt obtaining medical insurance      F: Replete PRN  E: Replete PRN  N: NPO currently, will advance diet post procedure or if no plans for percutaneous drainage  DVT Ppx: Lovenox q12H (wt based dosing) HELD in case of procedure, SCDs  GI Ppx: Pantoprazole   Access/Lines: PIV  Abx: Zosyn  O2: None     Pain regimen: Tylenol   GI Laxative: Miralax      Code status: Full Code (confirmed on admission)  NOK: Wife, Purvi Earl        Mir Bruno MD  Anesthesiology resident, PGY-1

## 2025-02-12 NOTE — NURSING NOTE
Mr. Earl is feeling well today.  States he anticipates discharge int he next day or two.  We reviewed the insulin regimen.  He is not at his full home dose of glargine yet.  States he understands a little more the importance of managing BG especially in presence of infection.  States he wants to be consistent ad adherent to the daily insulin regimen.    We discussed healthy meal/eating for home.  He is interested to speak with his PCP (Dr. Oates) re GLP-1 to assist with lowering A1c and weight management.  He is not interested in endocrinology follow up at this time.  Will follow as needed while inpatient.  Time spent:  15 mins.

## 2025-02-12 NOTE — PROGRESS NOTES
02/12/25 1340   Discharge Planning   Living Arrangements Spouse/significant other   Support Systems Spouse/significant other   Assistance Needed Independent for adls   Type of Residence Private residence   Number of Stairs to Enter Residence 4   Number of Stairs Within Residence 0   Home or Post Acute Services None   Expected Discharge Disposition Home   Financial Resource Strain   How hard is it for you to pay for the very basics like food, housing, medical care, and heating? Hard   Housing Stability   In the last 12 months, was there a time when you were not able to pay the mortgage or rent on time? Y   At any time in the past 12 months, were you homeless or living in a shelter (including now)? N   Transportation Needs   In the past 12 months, has lack of transportation kept you from medical appointments or from getting medications? no   In the past 12 months, has lack of transportation kept you from meetings, work, or from getting things needed for daily living? No   Stroke Family Assessment   Stroke Family Assessment Needed No   Intensity of Service   Intensity of Service 0-30 min     Transitional Care Coordination Progress Note:  Patient discussed during interdisciplinary rounds.   Team members present: MD, TCC  Plan per Medical/Surgical team: Abscess of perineum  Payor: [employer]  Discharge disposition: Home  Potential Barriers: none  ADOD: 2/13    Previous Home Care: NA  DME: NA  Pharmacy: [facility]  Falls: Denies  PCP:  Beltran Oates; last visit 6 months ago  Met with patient at bedside, provided introduction of self and role. Patient states he lives at home with wife. Independent for adls; safe at home. Patient states he normally drives self to appointments. Patient states no concerns obtaining/affording medications. Patient states he is having difficulty affording rent and utilities, he has recently received shut off notices for gas/electric. CHW/SW notified  via email to provide resources. Patient states he drove himself to the hospital and plans to drive himself home at [place]. Will continue to monitor for discharge planning needs.     Josefina SIMON, RN  Transitional Care Coordinator (TCC)  [phone]

## 2025-02-12 NOTE — CARE PLAN
Patient behind on rent and utilities due to unemployment.  Spoke to patient and will provide resources for electric and rental assistance. In addition will provide calendar to [employer] where patient to get in person assistance.      Community Resource Name:   Phone Number:   Staff Member:      Discussed the following topics on behalf of the patient:  []  Behavioral Health Assistance     []  Case Management  []   Assistance  []  Digital Equity Assistance  []  Dental Health Assistance  []  Education Assistance  []  Employment Assistance  []  Financial Strain Relief Assistance  []  Food Insecurity Assistance  []  Healthcare Coverage Assistance  [x]  Housing Stability Assistance  []  IP Violence Relief Assistance  []  Legal Assistance  []  Physical Activity Assistance  []  Social Connection Assistance  []  Stress Relief Assistance   []  Substance Abuse Assistance  []  Transportation Assistance  [x]  Utility Assistance  []  Other: [insert comment here]    Next Steps:         Brenda Mosquera LCSW

## 2025-02-13 ENCOUNTER — PHARMACY VISIT (OUTPATIENT)
Dept: PHARMACY | Facility: CLINIC | Age: 55
End: 2025-02-13
Payer: MEDICAID

## 2025-02-13 VITALS
HEIGHT: 67 IN | WEIGHT: 280 LBS | RESPIRATION RATE: 16 BRPM | BODY MASS INDEX: 43.95 KG/M2 | OXYGEN SATURATION: 90 % | TEMPERATURE: 97.7 F | HEART RATE: 81 BPM | DIASTOLIC BLOOD PRESSURE: 91 MMHG | SYSTOLIC BLOOD PRESSURE: 143 MMHG

## 2025-02-13 PROBLEM — Z00.00 HEALTHCARE MAINTENANCE: Status: ACTIVE | Noted: 2025-02-13

## 2025-02-13 LAB
GLUCOSE BLD MANUAL STRIP-MCNC: 132 MG/DL (ref 74–99)
GLUCOSE BLD MANUAL STRIP-MCNC: 262 MG/DL (ref 74–99)
HEMOCCULT SP1 STL QL: NEGATIVE

## 2025-02-13 PROCEDURE — 99238 HOSP IP/OBS DSCHRG MGMT 30/<: CPT

## 2025-02-13 PROCEDURE — 2500000001 HC RX 250 WO HCPCS SELF ADMINISTERED DRUGS (ALT 637 FOR MEDICARE OP): Mod: SE

## 2025-02-13 PROCEDURE — 2500000002 HC RX 250 W HCPCS SELF ADMINISTERED DRUGS (ALT 637 FOR MEDICARE OP, ALT 636 FOR OP/ED): Mod: SE

## 2025-02-13 PROCEDURE — RXMED WILLOW AMBULATORY MEDICATION CHARGE

## 2025-02-13 PROCEDURE — 82947 ASSAY GLUCOSE BLOOD QUANT: CPT

## 2025-02-13 PROCEDURE — 2500000004 HC RX 250 GENERAL PHARMACY W/ HCPCS (ALT 636 FOR OP/ED): Mod: SE

## 2025-02-13 RX ORDER — METOPROLOL SUCCINATE 25 MG/1
25 TABLET, EXTENDED RELEASE ORAL DAILY
Qty: 30 TABLET | Refills: 0 | Status: SHIPPED | OUTPATIENT
Start: 2025-02-13 | End: 2025-03-15

## 2025-02-13 RX ORDER — AMOXICILLIN AND CLAVULANATE POTASSIUM 875; 125 MG/1; MG/1
875 TABLET, FILM COATED ORAL 2 TIMES DAILY
Qty: 60 TABLET | Refills: 0 | Status: SHIPPED | OUTPATIENT
Start: 2025-02-13 | End: 2025-03-15

## 2025-02-13 RX ORDER — AMLODIPINE BESYLATE 10 MG/1
10 TABLET ORAL DAILY
Qty: 30 TABLET | Refills: 0 | Status: SHIPPED | OUTPATIENT
Start: 2025-02-13 | End: 2025-03-15

## 2025-02-13 RX ORDER — MULTIVIT-MIN/IRON FUM/FOLIC AC 7.5 MG-4
1 TABLET ORAL DAILY
Qty: 30 TABLET | Refills: 0 | Status: SHIPPED | OUTPATIENT
Start: 2025-02-14 | End: 2025-03-16

## 2025-02-13 RX ORDER — ROSUVASTATIN CALCIUM 40 MG/1
40 TABLET, COATED ORAL DAILY
Qty: 30 TABLET | Refills: 0 | Status: SHIPPED | OUTPATIENT
Start: 2025-02-13 | End: 2025-03-15

## 2025-02-13 RX ORDER — INSULIN GLARGINE 100 [IU]/ML
80 INJECTION, SOLUTION SUBCUTANEOUS NIGHTLY
Qty: 30 ML | Refills: 0 | Status: SHIPPED | OUTPATIENT
Start: 2025-02-13 | End: 2025-03-22

## 2025-02-13 RX ORDER — LOSARTAN POTASSIUM 100 MG/1
100 TABLET ORAL DAILY
Qty: 30 TABLET | Refills: 0 | Status: SHIPPED | OUTPATIENT
Start: 2025-02-13 | End: 2025-03-15

## 2025-02-13 RX ADMIN — THIAMINE HYDROCHLORIDE 100 MG: 100 INJECTION, SOLUTION INTRAMUSCULAR; INTRAVENOUS at 08:22

## 2025-02-13 RX ADMIN — METOPROLOL TARTRATE 12.5 MG: 25 TABLET, FILM COATED ORAL at 08:22

## 2025-02-13 RX ADMIN — INSULIN LISPRO 6 UNITS: 100 INJECTION, SOLUTION INTRAVENOUS; SUBCUTANEOUS at 10:37

## 2025-02-13 RX ADMIN — Medication 1 TABLET: at 08:22

## 2025-02-13 RX ADMIN — LOSARTAN POTASSIUM 100 MG: 50 TABLET, FILM COATED ORAL at 08:23

## 2025-02-13 RX ADMIN — PIPERACILLIN SODIUM AND TAZOBACTAM SODIUM 4.5 G: 4; .5 INJECTION, SOLUTION INTRAVENOUS at 10:34

## 2025-02-13 RX ADMIN — INSULIN LISPRO 4 UNITS: 100 INJECTION, SOLUTION INTRAVENOUS; SUBCUTANEOUS at 08:23

## 2025-02-13 RX ADMIN — ROSUVASTATIN CALCIUM 40 MG: 40 TABLET, FILM COATED ORAL at 08:25

## 2025-02-13 RX ADMIN — FOLIC ACID 1 MG: 1 TABLET ORAL at 08:22

## 2025-02-13 RX ADMIN — INSULIN LISPRO 6 UNITS: 100 INJECTION, SOLUTION INTRAVENOUS; SUBCUTANEOUS at 08:27

## 2025-02-13 RX ADMIN — AMLODIPINE BESYLATE 10 MG: 10 TABLET ORAL at 08:22

## 2025-02-13 RX ADMIN — PIPERACILLIN SODIUM AND TAZOBACTAM SODIUM 4.5 G: 4; .5 INJECTION, SOLUTION INTRAVENOUS at 04:06

## 2025-02-13 ASSESSMENT — LIFESTYLE VARIABLES
AGITATION: NORMAL ACTIVITY
TOTAL SCORE: 10
HEADACHE, FULLNESS IN HEAD: NOT PRESENT
TOTAL SCORE: 0
VISUAL DISTURBANCES: NOT PRESENT
ORIENTATION AND CLOUDING OF SENSORIUM: ORIENTED AND CAN DO SERIAL ADDITIONS
TREMOR: NO TREMOR
AGITATION: 5
PAROXYSMAL SWEATS: NO SWEAT VISIBLE
NAUSEA AND VOMITING: NO NAUSEA AND NO VOMITING
HEADACHE, FULLNESS IN HEAD: NOT PRESENT
ORIENTATION AND CLOUDING OF SENSORIUM: ORIENTED AND CAN DO SERIAL ADDITIONS
TREMOR: NO TREMOR
NAUSEA AND VOMITING: NO NAUSEA AND NO VOMITING
PAROXYSMAL SWEATS: NO SWEAT VISIBLE
VISUAL DISTURBANCES: NOT PRESENT
AUDITORY DISTURBANCES: NOT PRESENT
ANXIETY: 5
AUDITORY DISTURBANCES: NOT PRESENT
ANXIETY: NO ANXIETY, AT EASE

## 2025-02-13 ASSESSMENT — PAIN - FUNCTIONAL ASSESSMENT
PAIN_FUNCTIONAL_ASSESSMENT: 0-10
PAIN_FUNCTIONAL_ASSESSMENT: 0-10

## 2025-02-13 ASSESSMENT — COGNITIVE AND FUNCTIONAL STATUS - GENERAL: MOBILITY SCORE: 24

## 2025-02-13 ASSESSMENT — PAIN SCALES - GENERAL
PAINLEVEL_OUTOF10: 0 - NO PAIN
PAINLEVEL_OUTOF10: 0 - NO PAIN

## 2025-02-13 NOTE — NURSING NOTE
"Presented to nurses' station fully clothed in outside clothing wearing his hat and coat stating it is emergent that he goes to his car. I advised that we must obtain order from physician in order to leave floor. Mr. Earl was adamant about leaving. I advised without an order he would be leaving against medical advice therefore I would need to remove his peripheral IV and he would be signing himself out of the hospital. He appears to be agitated and is scoring 10 on CIWA assessment. I asked if I could administer valium however he refused and asked that he be allowed to speak with the physician about leaving the floor. When asked his reason for leaving he stated \"it's personal.\" Dr. Uribe made aware of situation and stated he will come to bedside to assess as soon as possible. Mr. Earl returned to bed after removing his hat coat and shoes and stated he will await the physician's arrival.    Faviola Ma RN    "

## 2025-02-13 NOTE — CARE PLAN
The patient's goals for the shift include NPO after midnight in prep for procedure on 2/13/25.    The clinical goals for the shift include no signs of ETOH withdrawal.    Over the shift, the patient did not make progress toward the following goals. Barriers to progression include n/a. Recommendations to address these barriers include n/a.

## 2025-02-13 NOTE — NURSING NOTE
"Dr. Uribe at bedside assessing Mr. Earl and it was explained that he can't leave unit and return without explaining reasoning for wanting to leave. he's refusing to express his concerns for wanting to leave the unit. He was educated on the reasoning of safety and hospital liability should anything happen to him while off the unit. Mr. Earl stated he will remain on the unit and \"will address it with his wife when she comes to visit today.\" Dr. Uribe educated him on the medications ordered for ETOH withdrawal symptoms however Mr. Earl is still refusing to take Valium. Will continue to monitor/assess.    Faviola Ma RN    "

## 2025-02-13 NOTE — CARE PLAN
The patient's goals for the shift include      The clinical goals for the shift include go home      Problem: Skin  Goal: Decreased wound size/increased tissue granulation at next dressing change  Outcome: Met  Goal: Participates in plan/prevention/treatment measures  Outcome: Met  Goal: Prevent/manage excess moisture  Outcome: Met  Goal: Prevent/minimize sheer/friction injuries  Outcome: Met  Goal: Promote/optimize nutrition  Outcome: Met  Goal: Promote skin healing  Outcome: Met     Problem: Diabetes  Goal: Achieve decreasing blood glucose levels by end of shift  Outcome: Met  Goal: Increase stability of blood glucose readings by end of shift  Outcome: Met  Goal: Decrease in ketones present in urine by end of shift  Outcome: Met  Goal: Maintain electrolyte levels within acceptable range throughout shift  Outcome: Met  Goal: Maintain glucose levels >70mg/dl to <250mg/dl throughout shift  Outcome: Met  Goal: No changes in neurological exam by end of shift  Outcome: Met  Goal: Learn about and adhere to nutrition recommendations by end of shift  Outcome: Met  Goal: Vital signs within normal range for age by end of shift  Outcome: Met  Goal: Increase self care and/or family involovement by end of shift  Outcome: Met  Goal: Receive DSME education by end of shift  Outcome: Met     Problem: Pain - Adult  Goal: Verbalizes/displays adequate comfort level or baseline comfort level  Outcome: Met     Problem: Safety - Adult  Goal: Free from fall injury  Outcome: Met     Problem: Discharge Planning  Goal: Discharge to home or other facility with appropriate resources  Outcome: Met     Problem: Chronic Conditions and Co-morbidities  Goal: Patient's chronic conditions and co-morbidity symptoms are monitored and maintained or improved  Outcome: Met     Problem: Nutrition  Goal: Nutrient intake appropriate for maintaining nutritional needs  Outcome: Met     Problem: Skin  Goal: Decreased wound size/increased tissue granulation at  next dressing change  Outcome: Met  Goal: Participates in plan/prevention/treatment measures  Outcome: Met  Goal: Prevent/manage excess moisture  Outcome: Met  Goal: Prevent/minimize sheer/friction injuries  Outcome: Met  Goal: Promote/optimize nutrition  Outcome: Met  Goal: Promote skin healing  Outcome: Met     Problem: Diabetes  Goal: Achieve decreasing blood glucose levels by end of shift  Outcome: Met  Goal: Increase stability of blood glucose readings by end of shift  Outcome: Met  Goal: Decrease in ketones present in urine by end of shift  Outcome: Met  Goal: Maintain electrolyte levels within acceptable range throughout shift  Outcome: Met  Goal: Maintain glucose levels >70mg/dl to <250mg/dl throughout shift  Outcome: Met  Goal: No changes in neurological exam by end of shift  Outcome: Met  Goal: Learn about and adhere to nutrition recommendations by end of shift  Outcome: Met  Goal: Vital signs within normal range for age by end of shift  Outcome: Met  Goal: Increase self care and/or family involovement by end of shift  Outcome: Met  Goal: Receive DSME education by end of shift  Outcome: Met     Problem: Pain - Adult  Goal: Verbalizes/displays adequate comfort level or baseline comfort level  Outcome: Met     Problem: Safety - Adult  Goal: Free from fall injury  Outcome: Met     Problem: Discharge Planning  Goal: Discharge to home or other facility with appropriate resources  Outcome: Met     Problem: Chronic Conditions and Co-morbidities  Goal: Patient's chronic conditions and co-morbidity symptoms are monitored and maintained or improved  Outcome: Met     Problem: Nutrition  Goal: Nutrient intake appropriate for maintaining nutritional needs  Outcome: Met

## 2025-02-13 NOTE — DISCHARGE SUMMARY
Discharge Diagnosis  Abscess of perineum    Issues Requiring Follow-Up  Perineal abscess  Poorly controlled DM  Hypertension  Lung nodules  Nodular Liver, c/f cirrhosis    Discharge Meds     Medication List      START taking these medications     amoxicillin-pot clavulanate 875-125 mg tablet; Commonly known as:   Augmentin; Take 1 tablet (875 mg) by mouth 2 times a day.   multivitamin with minerals tablet; Take 1 tablet by mouth once daily.;   Start taking on: February 14, 2025     CONTINUE taking these medications     amLODIPine 10 mg tablet; Commonly known as: Norvasc; Take 1 tablet (10   mg) by mouth once daily.   Lantus Solostar U-100 Insulin 100 unit/mL (3 mL) pen; Generic drug:   insulin glargine; Inject 80 Units under the skin once daily at bedtime.   losartan 100 mg tablet; Commonly known as: Cozaar; Take 1 tablet (100   mg) by mouth once daily.   metoprolol succinate XL 25 mg 24 hr tablet; Commonly known as:   Toprol-XL; Take 1 tablet (25 mg) by mouth once daily.   rosuvastatin 40 mg tablet; Commonly known as: Crestor; Take 1 tablet (40   mg) by mouth once daily.     STOP taking these medications     Januvia 100 mg tablet; Generic drug: SITagliptin phosphate     ASK your doctor about these medications     dextran 70-hypromellose 0.1-0.3 % ophthalmic solution; Commonly known   as: Bion Tears; Administer 1 drop into both eyes 3 times a day as needed   for dry eyes.   ibuprofen 600 mg tablet; Take 1 tablet (600 mg) by mouth every 8 hours   if needed for mild pain (1 - 3) or fever (temp greater than 38.0 C).       Test Results Pending At Discharge  Pending Labs       Order Current Status    Genital Culture Preliminary result            Hospital Course  Mr. Earl is 54 y.o. gentleman with hx of IDDM type II with inconsistent insulin use, HTN with medication non adherence, DLD, heavy EtOH consumption, and tobacco use, hx of urethral stricture s/p urethroplasty, hx of urethral, scrotal, perineal abscesses s/p  drainage who was transferred from [facility] ED to [facility] with new perineal abscess discovered on CT A/P.  Pt was afebrile, without leukocytosis on presentation.   Zosyn was initiated, urethral and urine cultures obtained.  Urine cultures were negative. Urethral culture was still in progress at the time of pt's discharge.  IR was consulted for possible drainage of the abscess, and after waiting on the schedule for 2 days, pt elected to be discharged home on oral antibiotics.  Of note, pt was non adherent to his antihyperglycemic regimen (was taking insulin when felt like needed ir/felt unwell from hyperglycemia), presented with BG of 256 and A1C of 13.9. Diabetic educator was consulted, and pt's insulin was restarted.  His BP medications were also restarted in the hospital.   Pt was discharged on 2/13/25 with 30 day supply of Augmentin with instruction to follow up with PCP, ID, and Urology.    Of note, pt has hx of CT scan chest showing pulmonary nodules c/f malignancy and nodular appearing liver c/f cirrhosis, recommended to follow up with PCP for monitoring and further investigation.  Stool occult blood was negative on this admission.        Pertinent Physical Exam At Time of Discharge  Physical Exam    Constitutional:       General: He is not in acute distress.     Appearance: He is not ill-appearing.   HENT:      Head: Normocephalic.      Nose: Nose normal. No congestion or rhinorrhea.     Eyes:      General: No scleral icterus. Bilateral scleral injection present.      Extraocular Movements: Extraocular movements intact.   Cardiovascular:      Rate and Rhythm: Normal rate and regular rhythm.      Heart sounds: Normal heart sounds. No murmur heard. No friction rub. No gallop.   Pulmonary:      Effort: Pulmonary effort is normal. No respiratory distress.      Breath sounds: Normal breath sounds. No wheezing or rhonchi.   Abdominal:      General: Abdomen is protuberant.     Palpations: Abdomen is soft.       Tenderness: There is no abdominal tenderness. There is no guarding or rebound.   Musculoskeletal:         Right lower leg: No edema.      Left lower leg: No edema.   Skin:     General: Skin is warm and dry.   Neurological:      General: No focal deficit present.      Mental Status: He is alert. Mental status is at baseline.      Sensory: No sensory deficit.      Motor: No weakness.      Coordination: Coordination normal.   Psychiatric:         Mood and Affect: Mood normal.         Behavior: Behavior normal.         Thought Content: Thought content normal.         Judgment: Judgment normal.        Outpatient Follow-Up  Future Appointments   Date Time Provider Department Center   3/11/2025 11:00 AM Jay Carey MD [id number]         Mir Bruno MD

## 2025-02-13 NOTE — DISCHARGE INSTRUCTIONS
Dear Mr. Earl,    You were admitted to [facility] with a perineal infection for which you were treated with an intravenous antibiotic. We tried to organize the abscess drainage with help of Interventional radiology, but it was postponed for two days due to scheduling issues. We discussion with you pros and cons to stay in the hospital to have the abscess drained versus discharging you home with oral antibiotic course and close follow up with Infection disease physician and your PCP. You elected the later.    Your blood sugar levels were very high when you came to the hospital, and your A1C test showed that they were high for quite some time prior to your hospital visit. We restarted you on your daily insulin. Please continue taking it daily, as directed, and follow up with your PCP to monitor blood sugars.    Your blood pressure was also high when you came to the hospital, so we restarted your blood pressure medications you were on in the past. Please continue to take those medications and follow up with your PCP to monitor your blood pressure.  During this hospitalization we also worked with a  and obtained a medical insurance.    In the past, you had a chest imaging that showed nodules in your lungs and some changes in your liver concerning for liver cirrhosis. Please follow up with your PCP to address these important issues.    TO DO:  Follow up with PCP within one month of discharge  Virtual Appointment with Dr. Carey (Infectious Disease Doctor)  Urologist within one month of discarge

## 2025-02-13 NOTE — NURSING NOTE
Brief visit today.  Mr. Earl is feeling well today and states he is to be discharged today.  He is comfortable with resuming daily dose of Lantus (80 units per note).  He is aware to stop Januvia in presence of UTI and not to resume until follow up appt and discussion.    Will sign off at this time as he is to be discharged to home.

## 2025-02-14 LAB — BACTERIA GENITAL AEROBE CULT: NORMAL

## 2025-02-17 NOTE — DOCUMENTATION CLARIFICATION NOTE
"    PATIENT:               JAZZ BYNUM  ACCT #:                  [id number]  MRN:                       [id number]  :                       1970  ADMIT DATE:       2/10/2025 10:19 PM  DISCH DATE:        2025 3:35 PM  RESPONDING PROVIDER #:        [id number]          PROVIDER RESPONSE TEXT:    Perineal abscess is unrelated to 2021 urethroplasty surgery    CDI QUERY TEXT:    Clarification    Instruction:    Based on your assessment of the patient and the clinical information, please provide the requested documentation by clicking on the appropriate radio button and enter any additional information if prompted.    Question: Please further clarify if a relationship exists between the perineal abscess and the 2021 urethroplasty surgery    When answering this query, please exercise your independent professional judgment. The fact that a question is being asked, does not imply that any particular answer is desired or expected.    The patient's clinical indicators include:  Clinical Information:  PN 54 YOM \"with complex urologic hx including recurrent UTIs, urethral stricture s/p buccal mucosal graft in , urethral, perianal, scrotal, and epididymal abscesses s/p multiple drainages presenting with new perineal abscess\"    Clinical Indicators: 2/10 Urology consult \"2 days of perineal discomfort with similar sensation of prior perineal abscesses\",  \"PMH of complex urethral stricture secondary to recurrent urethral abscesses s/p dorsal onlay buccal mucosal bulbar urethroplasty (Quiroz ), epididymal abscess/perianal abscesses\"    Treatment: IR consult for abscess drainage evaluation. Zosyn started 2/10 w/plan to give Augmentin on discharge.    Risk Factors: Urethroplasty surgery 2021. Recurrent UTIs, urethral stricture necessitating  surgery. Hx multiple abscesses s/p multiple drainage procedures.  Options provided:  -- Perineal abscess is related to or is a complication of the 2021 " urethroplasty surgery  -- Perineal abscess is unrelated to 4/2021 urethroplasty surgery  -- Other - I will add my own diagnosis  -- Refer to Clinical Documentation Reviewer    Query created by: Radha Soriano on 2/17/2025 4:15 PM      Electronically signed by:  VY MARTINEZ MD 2/17/2025 6:31 PM

## 2025-02-18 NOTE — DOCUMENTATION CLARIFICATION NOTE
"    PATIENT:               JAZZ BYNUM  ACCT #:                  [id number]  MRN:                       [id number]  :                       1970  ADMIT DATE:       2/10/2025 10:19 PM  DISCH DATE:        2025 3:35 PM  RESPONDING PROVIDER #:        [id number]          PROVIDER RESPONSE TEXT:    New perineal abscess is cutaneous abscess of perineum    CDI QUERY TEXT:    Clarification    Instruction:  Based on your assessment of the patient and the clinical information, please provide the requested documentation by clicking on the appropriate radio button and enter any additional information if prompted.    Question: Please further clarify the depth of new perineal abscess    When answering this query, please exercise your independent professional judgment. The fact that a question is being asked, does not imply that any particular answer is desired or expected.    The patient's clinical indicators include:  Clinical Information:  DCS: 54 YOM admitted for abscess of perineum    Clinical Indicators: 2/10 OSH CT A/P: \"4.6 cm low-density collection suspicious for abscess in the perineum, inferior extent not fully included on this exam.\"  2/10 UCx: \"No growth\"   Genital Cx: \"No Neisseria gonorrhoeae or other pathogens isolated\"   Chlamydia trachomatis, Neisseria gonorrhea, and Trichomonas Vaginalis \"Negative\"     HP noted at OSH ED presentation \"experiencing pain between his perineum and scrotum with urination, as well as noting blood and pus in the front of his underwear and in the toilet while peeing. He reports that this pain has now resolved overnight and that his last void was painless and nonbloody\"     PN PMH includes \"urethral stricture s/p buccal mucosal graft in , urethral, perianal, scrotal, and epididymal abscesses s/p multiple drainages\"     DCS \"new perineal abscess discovered on CT A/P.\"  \"IR was consulted for possible drainage of the abscess, and after waiting on " "the schedule for 2 days, pt elected to be discharged home on oral antibiotics\"    Treatment: IR consult. Zosyn initiated. Discharged on Augmentin with follow up instructions.    Risk Factors: Hx urethral stricture s/p graft. Hx urethral, perianal, scrotal, and epididymal abscesses s/p drainages. Poorly controlled DM2.  Options provided:  -- New perineal abscess is cutaneous abscess of perineum  -- New perineal abscess is urethral abscess of perineum  -- Other - I will add my own diagnosis  -- Refer to Clinical Documentation Reviewer    Query created by: Radha Soriano on 2/18/2025 9:22 AM      Electronically signed by:  VY MARTINEZ MD 2/18/2025 10:13 AM          "

## 2025-03-11 ENCOUNTER — APPOINTMENT (OUTPATIENT)
Dept: INFECTIOUS DISEASES | Facility: CLINIC | Age: 55
End: 2025-03-11
Payer: MEDICAID

## 2025-03-11 DIAGNOSIS — L02.215 ABSCESS OF PERINEUM: ICD-10-CM

## 2025-03-11 DIAGNOSIS — N34.0 URETHRAL ABSCESS: Primary | ICD-10-CM

## 2025-03-11 PROCEDURE — 99214 OFFICE O/P EST MOD 30 MIN: CPT | Performed by: INTERNAL MEDICINE

## 2025-03-11 NOTE — PROGRESS NOTES
Patient was hospitalized under the [facility] at  in February.  He has a history of type 2 diabetes with inconsistent insulin use hypertension prior alcohol consumption and a complicated urologic history with a history of urethral stricture s/p urethroplasty and prior urethral scrotal perineal abscesses.  He was transferred from [facility] to  with perineal abscesses discovered on CAT scan.  He was initiated on into venous piperacillin/tazobactam cultures were obtained and were negative,  urology was consulted and referred to IR for any drainage IR was consulted but the collections appear to resolve with antimicrobial therapy.  Diabetic educators and her care was consulted he started back on blood pressure medicines the plan was to discharge him on a 30 days of Augmentin we will follow-up with primary care or urology.      When he spoke to him today he says his penis and perineal area are back to baseline with no swelling induration drainage warmth tenderness.  He has excellent tolerance of Augmentin.  He has an appointment with primary care next week.  He mentioned his discharge papers included a reference to cirrhosis, looking back at prior episodes of care in 2023 2024 there was concern for possible cirrhosis related to prior ethanol use and/or obesity  Currently feels well and says his sugars are under control    Virtual visit    Assessment-he has a complicated urologic history status with urethroplasty.  Subjectively currently has no concerns for infection but given his history there is a chance of persistence relapse or recurrence.  We gave him our contact information and encouraged him to call us with any concerns.  We discussed his ongoing medical problems including diabetes obesity and cirrhosis, we mentioned how the new GLP-1 agonist have been remarkably effective in patients with diabetes in terms of controlling diabetes weight loss and also have a positive impact for liver disease  associate with obesity.  He has a primary care appointment next week.  Virtual or Telephone Consent    An interactive audio and video telecommunication system which permits real time communications between the patient (at the originating site) and provider (at the distant site) was utilized to provide this telehealth service.   Verbal consent was requested and obtained from Elliot Earl on this date, 03/11/25 for a telehealth visit and the patient's location was confirmed at the time of the visit.

## 2025-03-24 ENCOUNTER — APPOINTMENT (OUTPATIENT)
Dept: UROLOGY | Facility: CLINIC | Age: 55
End: 2025-03-24
Payer: MEDICAID

## 2025-05-21 ENCOUNTER — HOSPITAL ENCOUNTER (INPATIENT)
Facility: HOSPITAL | Age: 55
LOS: 4 days | Discharge: HOME | End: 2025-05-25
Attending: INTERNAL MEDICINE | Admitting: INTERNAL MEDICINE
Payer: MEDICAID

## 2025-05-21 ENCOUNTER — APPOINTMENT (OUTPATIENT)
Dept: RADIOLOGY | Facility: HOSPITAL | Age: 55
End: 2025-05-21
Payer: MEDICAID

## 2025-05-21 DIAGNOSIS — R06.83 SNORING: ICD-10-CM

## 2025-05-21 DIAGNOSIS — N34.0 URETHRAL ABSCESS: ICD-10-CM

## 2025-05-21 DIAGNOSIS — E66.01 MORBID OBESITY (MULTI): ICD-10-CM

## 2025-05-21 DIAGNOSIS — L02.215 PERINEAL ABSCESS: Primary | ICD-10-CM

## 2025-05-21 LAB
ALBUMIN SERPL BCP-MCNC: 4.3 G/DL (ref 3.4–5)
ALP SERPL-CCNC: 74 U/L (ref 33–120)
ALT SERPL W P-5'-P-CCNC: 18 U/L (ref 10–52)
ANION GAP SERPL CALCULATED.3IONS-SCNC: 10 MMOL/L (ref 10–20)
AST SERPL W P-5'-P-CCNC: 11 U/L (ref 9–39)
BASOPHILS # BLD MANUAL: 0.49 X10*3/UL (ref 0–0.1)
BASOPHILS NFR BLD MANUAL: 4 %
BILIRUB SERPL-MCNC: 0.6 MG/DL (ref 0–1.2)
BUN SERPL-MCNC: 11 MG/DL (ref 6–23)
CALCIUM SERPL-MCNC: 9.6 MG/DL (ref 8.6–10.3)
CHLORIDE SERPL-SCNC: 103 MMOL/L (ref 98–107)
CO2 SERPL-SCNC: 28 MMOL/L (ref 21–32)
CREAT SERPL-MCNC: 1.01 MG/DL (ref 0.5–1.3)
EGFRCR SERPLBLD CKD-EPI 2021: 89 ML/MIN/1.73M*2
EOSINOPHIL # BLD MANUAL: 0.12 X10*3/UL (ref 0–0.7)
EOSINOPHIL NFR BLD MANUAL: 1 %
ERYTHROCYTE [DISTWIDTH] IN BLOOD BY AUTOMATED COUNT: 14.5 % (ref 11.5–14.5)
GLUCOSE SERPL-MCNC: 189 MG/DL (ref 74–99)
HCT VFR BLD AUTO: 48.9 % (ref 41–52)
HGB BLD-MCNC: 15.8 G/DL (ref 13.5–17.5)
IMM GRANULOCYTES # BLD AUTO: 0.06 X10*3/UL (ref 0–0.7)
IMM GRANULOCYTES NFR BLD AUTO: 0.5 % (ref 0–0.9)
LYMPHOCYTES # BLD MANUAL: 2.95 X10*3/UL (ref 1.2–4.8)
LYMPHOCYTES NFR BLD MANUAL: 24 %
MAGNESIUM SERPL-MCNC: 1.92 MG/DL (ref 1.6–2.4)
MCH RBC QN AUTO: 26.3 PG (ref 26–34)
MCHC RBC AUTO-ENTMCNC: 32.3 G/DL (ref 32–36)
MCV RBC AUTO: 81 FL (ref 80–100)
MONOCYTES # BLD MANUAL: 0.86 X10*3/UL (ref 0.1–1)
MONOCYTES NFR BLD MANUAL: 7 %
NEUTS SEG # BLD MANUAL: 7.87 X10*3/UL (ref 1.2–7)
NEUTS SEG NFR BLD MANUAL: 64 %
NRBC BLD-RTO: 0 /100 WBCS (ref 0–0)
PLATELET # BLD AUTO: 181 X10*3/UL (ref 150–450)
POLYCHROMASIA BLD QL SMEAR: ABNORMAL
POTASSIUM SERPL-SCNC: 4.3 MMOL/L (ref 3.5–5.3)
PROT SERPL-MCNC: 7.3 G/DL (ref 6.4–8.2)
RBC # BLD AUTO: 6.01 X10*6/UL (ref 4.5–5.9)
RBC MORPH BLD: ABNORMAL
SODIUM SERPL-SCNC: 137 MMOL/L (ref 136–145)
TOTAL CELLS COUNTED BLD: 100
WBC # BLD AUTO: 12.3 X10*3/UL (ref 4.4–11.3)

## 2025-05-21 PROCEDURE — 2500000004 HC RX 250 GENERAL PHARMACY W/ HCPCS (ALT 636 FOR OP/ED): Mod: JZ | Performed by: INTERNAL MEDICINE

## 2025-05-21 PROCEDURE — 99285 EMERGENCY DEPT VISIT HI MDM: CPT | Mod: 25

## 2025-05-21 PROCEDURE — 72193 CT PELVIS W/DYE: CPT

## 2025-05-21 PROCEDURE — 36415 COLL VENOUS BLD VENIPUNCTURE: CPT

## 2025-05-21 PROCEDURE — 2500000004 HC RX 250 GENERAL PHARMACY W/ HCPCS (ALT 636 FOR OP/ED): Mod: JZ

## 2025-05-21 PROCEDURE — 96375 TX/PRO/DX INJ NEW DRUG ADDON: CPT

## 2025-05-21 PROCEDURE — 96374 THER/PROPH/DIAG INJ IV PUSH: CPT

## 2025-05-21 PROCEDURE — 80053 COMPREHEN METABOLIC PANEL: CPT

## 2025-05-21 PROCEDURE — 2550000001 HC RX 255 CONTRASTS

## 2025-05-21 PROCEDURE — 85027 COMPLETE CBC AUTOMATED: CPT

## 2025-05-21 PROCEDURE — 1210000001 HC SEMI-PRIVATE ROOM DAILY

## 2025-05-21 PROCEDURE — 85007 BL SMEAR W/DIFF WBC COUNT: CPT

## 2025-05-21 PROCEDURE — 83735 ASSAY OF MAGNESIUM: CPT

## 2025-05-21 PROCEDURE — 72193 CT PELVIS W/DYE: CPT | Performed by: STUDENT IN AN ORGANIZED HEALTH CARE EDUCATION/TRAINING PROGRAM

## 2025-05-21 RX ORDER — MORPHINE SULFATE 4 MG/ML
4 INJECTION, SOLUTION INTRAMUSCULAR; INTRAVENOUS ONCE
Status: COMPLETED | OUTPATIENT
Start: 2025-05-21 | End: 2025-05-21

## 2025-05-21 RX ORDER — ONDANSETRON HYDROCHLORIDE 2 MG/ML
4 INJECTION, SOLUTION INTRAVENOUS ONCE
Status: COMPLETED | OUTPATIENT
Start: 2025-05-21 | End: 2025-05-21

## 2025-05-21 RX ADMIN — ONDANSETRON 4 MG: 2 INJECTION, SOLUTION INTRAMUSCULAR; INTRAVENOUS at 19:55

## 2025-05-21 RX ADMIN — MORPHINE SULFATE 4 MG: 4 INJECTION, SOLUTION INTRAMUSCULAR; INTRAVENOUS at 19:56

## 2025-05-21 RX ADMIN — IOHEXOL 75 ML: 350 INJECTION, SOLUTION INTRAVENOUS at 20:27

## 2025-05-21 RX ADMIN — PIPERACILLIN SODIUM AND TAZOBACTAM SODIUM 3.38 G: 3; .375 INJECTION, SOLUTION INTRAVENOUS at 23:54

## 2025-05-21 ASSESSMENT — PAIN - FUNCTIONAL ASSESSMENT
PAIN_FUNCTIONAL_ASSESSMENT: 0-10
PAIN_FUNCTIONAL_ASSESSMENT: 0-10

## 2025-05-21 ASSESSMENT — PAIN SCALES - GENERAL
PAINLEVEL_OUTOF10: 1
PAINLEVEL_OUTOF10: 8

## 2025-05-21 ASSESSMENT — PAIN DESCRIPTION - PAIN TYPE: TYPE: ACUTE PAIN

## 2025-05-21 ASSESSMENT — PAIN DESCRIPTION - LOCATION: LOCATION: SCROTUM

## 2025-05-21 NOTE — ED PROVIDER NOTES
HPI   Chief Complaint   Patient presents with    Abscess       Patient is a 54-year-old male with a history of poorly controlled diabetes, hypertension, history of urethral stricture status post urethroplasty with history of urethral, scrotal, perineal abscesses presenting to the emergency department for evaluation of possible perineal abscess.  Patient states he noticed a few days ago he started having pain in the left perineal region.  He states he is afraid he is developing another abscess.  He denies any drainage to the region.  He denies any chest pain, shortness of breath, fevers, chills, nausea, vomiting, abdominal pain.  He states he otherwise feels well.              Patient History   Medical History[1]  Surgical History[2]  Family History[3]  Social History[4]    Physical Exam   ED Triage Vitals [05/21/25 1744]   Temperature Heart Rate Respirations BP   36.4 °C (97.5 °F) 94 18 137/90      Pulse Ox Temp Source Heart Rate Source Patient Position   97 % Temporal -- --      BP Location FiO2 (%)     -- --       Physical Exam  Vitals and nursing note reviewed.   Constitutional:       General: He is not in acute distress.     Appearance: Normal appearance. He is not ill-appearing or toxic-appearing.   HENT:      Head: Normocephalic and atraumatic.      Nose: Nose normal.      Mouth/Throat:      Mouth: Mucous membranes are moist.   Eyes:      Extraocular Movements: Extraocular movements intact.      Pupils: Pupils are equal, round, and reactive to light.   Cardiovascular:      Rate and Rhythm: Normal rate and regular rhythm.      Pulses: Normal pulses.   Pulmonary:      Effort: Pulmonary effort is normal.      Breath sounds: Normal breath sounds.   Abdominal:      Palpations: Abdomen is soft.      Tenderness: There is no abdominal tenderness. There is no guarding or rebound.   Musculoskeletal:         General: Normal range of motion.      Cervical back: Normal range of motion.   Skin:     General: Skin is warm and  dry.      Comments: Tenderness in the perineal region concerning for possible abscess.  No fluctuance   Neurological:      General: No focal deficit present.      Mental Status: He is alert and oriented to person, place, and time.   Psychiatric:         Mood and Affect: Mood normal.         Behavior: Behavior normal.           ED Course & MDM   Diagnoses as of 05/21/25 2221   Perineal abscess                 No data recorded     [place] Coma Scale Score: 15 (05/21/25 1945 : Ayaal Lentz LPN)                           Medical Decision Making  **Disclaimer parts of this chart have been completed using voice recognition software. Please excuse any errors of transcription.     Evaluated this patient independently and my supervising physician was available for consultation.    HPI: Detailed above.    Exam: A medically appropriate exam performed, outlined above, given the known history and presentation.    History obtained from: Patient    Labs/Diagnostics:  Labs Reviewed   CBC WITH AUTO DIFFERENTIAL - Abnormal       Result Value    WBC 12.3 (*)     nRBC 0.0      RBC 6.01 (*)     Hemoglobin 15.8      Hematocrit 48.9      MCV 81      MCH 26.3      MCHC 32.3      RDW 14.5      Platelets 181      Immature Granulocytes %, Automated 0.5      Immature Granulocytes Absolute, Automated 0.06     COMPREHENSIVE METABOLIC PANEL - Abnormal    Glucose 189 (*)     Sodium 137      Potassium 4.3      Chloride 103      Bicarbonate 28      Anion Gap 10      Urea Nitrogen 11      Creatinine 1.01      eGFR 89      Calcium 9.6      Albumin 4.3      Alkaline Phosphatase 74      Total Protein 7.3      AST 11      Bilirubin, Total 0.6      ALT 18     MANUAL DIFFERENTIAL - Abnormal    Neutrophils %, Manual 64.0      Lymphocytes %, Manual 24.0      Monocytes %, Manual 7.0      Eosinophils %, Manual 1.0      Basophils %, Manual 4.0      Seg Neutrophils Absolute, Manual 7.87 (*)     Lymphocytes Absolute, Manual 2.95      Monocytes Absolute, Manual  0.86      Eosinophils Absolute, Manual 0.12      Basophils Absolute, Manual 0.49 (*)     Total Cells Counted 100      RBC Morphology See Below      Polychromasia Mild     MAGNESIUM - Normal    Magnesium 1.92       CT pelvis w IV contrast   Final Result   Just medial to the left ischiocavernosus muscle, there is   redemonstration of a 5.8 cm x 1.6 cm (transaxial) x 4.4 cm   (craniocaudal). To the extent visualized on the prior study this is   similar in size, previously measuring ~ 4.6 cm x 1.9 cm in   transaxial dimension. This was significantly smaller on 10/22/2024   but was larger on 10/18/2024. Its location appears to be the between   the left corpus cavernosum and the corpus spongiosum deep to the   investing fascia of the penis. Findings likely relate to a chronic   recurring periurethral abscess or sterile fluid collection due to   potential chronic urethral fistula. There is a scar tissue tract   extending from this collection into the perineal soft tissues   indicating this may have represented a previous active fistula tract   to the skin surface. Urological follow-up is recommended.                       MACRO:   None.        Signed by: Perry Cassidy 5/21/2025 8:50 PM   Dictation workstation:   [id number]        EMERGENCY DEPARTMENT COURSE and DIFFERENTIAL DIAGNOSIS/MDM:  Patient is a 54-year-old male presenting to the emergency department for evaluation of possible perineal abscess.  On physical exam vital signs remarkable for diastolic hypertension but otherwise stable and patient is in no acute distress.  Patient has area of induration in the perineum concerning for possible developing abscess.  He does have a history of abscesses in the past.  CT of the pelvis ordered as well as diagnostic labs.  IV morphine and IV Zofran.  CBC showed mild leukocytosis but no anemia.  CMP showed no electrolyte abnormalities.  Magnesium normal.  CT of the pelvis showed perineal abscess.  I consulted Dr. Apple who  "recommended admission to medicine and n.p.o. at midnight and that they would consult.  Agreeable to stay in the emergency department.  IV Zosyn ordered.  I spoke with attending physician  who agreed to admission for further management of perineal abscess.    The patient presented with a chief complaint of possible abscess. The differential diagnosis associated with this patient's presentation includes gangrene, perineal abscess, fistula.     Vitals:    Vitals:    05/21/25 1744   BP: 137/90   Pulse: 94   Resp: 18   Temp: 36.4 °C (97.5 °F)   TempSrc: Temporal   SpO2: 97%   Weight: 125 kg (275 lb)   Height: 1.702 m (5' 7\")     History Limited by:    None    Independent history obtained from:    None    External records reviewed:    Inpatient Notes/Discharge Summary from 2/13/2025 where patient was seen for a perineal abscess    Diagnostics interpreted by me:    CT Scan(s) see MDM    Discussions with other clinicians:    Urology Dr. Apple    Chronic conditions impacting care:    Diabetes    Social determinants of health affecting care:    None    Diagnostic tests considered but not performed: None    ED Medications managed:    Medications   piperacillin-tazobactam (Zosyn) 3.375 g in dextrose (iso) IV 50 mL (has no administration in time range)   ondansetron (Zofran) injection 4 mg (4 mg intravenous Given 5/21/25 1955)   morphine injection 4 mg (4 mg intravenous Given 5/21/25 1956)   iohexol (OMNIPaque) 350 mg iodine/mL solution 75 mL (75 mL intravenous Given 5/21/25 2027)       Prescription drugs considered:    None    Screenings:              Procedure  Procedures         [1]   Past Medical History:  Diagnosis Date    Acute myocardial infarction, unspecified 11/11/2020    Acute MI    Complex tear of medial meniscus, current injury, right knee, initial encounter 06/17/2020    Complex tear of medial meniscus of right knee as current injury, initial encounter    Cutaneous abscess of perineum 01/04/2021    " Perineal abscess    Diabetes mellitus (Multi)     Effusion, right knee 09/15/2020    Knee effusion, right    Encounter for other specified aftercare 05/04/2021    Visit for wound check    Low back pain, unspecified 06/20/2018    Lumbar pain    Low back pain, unspecified 01/16/2019    Lumbar pain    Other conditions influencing health status 12/17/2020    History of cough    Other specified health status     No pertinent past medical history    Pain in left knee     Left knee pain, unspecified chronicity    Personal history of other diseases of male genital organs 07/09/2020    History of balanitis    Radiculopathy, lumbar region 05/03/2019    Acute lumbar radiculopathy    Urinary tract infection, site not specified 05/04/2021    Acute urinary tract infection   [2]   Past Surgical History:  Procedure Laterality Date    OTHER SURGICAL HISTORY  07/09/2020    Knee surgery    OTHER SURGICAL HISTORY  07/09/2020    Abscess incision and drainage   [3] No family history on file.  [4]   Social History  Tobacco Use    Smoking status: Every Day     Current packs/day: 1.00     Average packs/day: 1 pack/day for 20.0 years (20.0 ttl pk-yrs)     Types: Cigarettes    Smokeless tobacco: Not on file   Vaping Use    Vaping status: Never Used   Substance Use Topics    Alcohol use: Yes    Drug use: Not Currently     Types: Marijuana        Jessica Chaudhry PA-C  05/21/25 [id number]

## 2025-05-22 ENCOUNTER — ANESTHESIA EVENT (OUTPATIENT)
Dept: OPERATING ROOM | Facility: HOSPITAL | Age: 55
End: 2025-05-22
Payer: MEDICAID

## 2025-05-22 ENCOUNTER — ANESTHESIA (OUTPATIENT)
Dept: OPERATING ROOM | Facility: HOSPITAL | Age: 55
End: 2025-05-22
Payer: MEDICAID

## 2025-05-22 ENCOUNTER — APPOINTMENT (OUTPATIENT)
Dept: CARDIOLOGY | Facility: HOSPITAL | Age: 55
End: 2025-05-22
Payer: MEDICAID

## 2025-05-22 PROBLEM — E10.9 DIABETES MELLITUS TYPE 1 (MULTI): Status: ACTIVE | Noted: 2025-05-22

## 2025-05-22 PROBLEM — E66.9 OBESITY: Status: ACTIVE | Noted: 2025-05-22

## 2025-05-22 PROBLEM — I10 HTN (HYPERTENSION): Status: ACTIVE | Noted: 2025-05-22

## 2025-05-22 PROBLEM — E78.5 HYPERLIPIDEMIA: Status: ACTIVE | Noted: 2025-05-22

## 2025-05-22 LAB
ALBUMIN SERPL BCP-MCNC: 3.9 G/DL (ref 3.4–5)
ALP SERPL-CCNC: 68 U/L (ref 33–120)
ALT SERPL W P-5'-P-CCNC: 15 U/L (ref 10–52)
ANION GAP SERPL CALCULATED.3IONS-SCNC: 11 MMOL/L (ref 10–20)
APPEARANCE UR: CLEAR
AST SERPL W P-5'-P-CCNC: 9 U/L (ref 9–39)
BILIRUB SERPL-MCNC: 0.4 MG/DL (ref 0–1.2)
BILIRUB UR STRIP.AUTO-MCNC: NEGATIVE MG/DL
BUN SERPL-MCNC: 10 MG/DL (ref 6–23)
CALCIUM SERPL-MCNC: 8.8 MG/DL (ref 8.6–10.3)
CHLORIDE SERPL-SCNC: 105 MMOL/L (ref 98–107)
CO2 SERPL-SCNC: 25 MMOL/L (ref 21–32)
COLOR UR: COLORLESS
CREAT SERPL-MCNC: 0.92 MG/DL (ref 0.5–1.3)
EGFRCR SERPLBLD CKD-EPI 2021: >90 ML/MIN/1.73M*2
ERYTHROCYTE [DISTWIDTH] IN BLOOD BY AUTOMATED COUNT: 14.6 % (ref 11.5–14.5)
EST. AVERAGE GLUCOSE BLD GHB EST-MCNC: 269 MG/DL
GLUCOSE BLD MANUAL STRIP-MCNC: 120 MG/DL (ref 74–99)
GLUCOSE BLD MANUAL STRIP-MCNC: 125 MG/DL (ref 74–99)
GLUCOSE BLD MANUAL STRIP-MCNC: 167 MG/DL (ref 74–99)
GLUCOSE BLD MANUAL STRIP-MCNC: 176 MG/DL (ref 74–99)
GLUCOSE BLD MANUAL STRIP-MCNC: 214 MG/DL (ref 74–99)
GLUCOSE BLD MANUAL STRIP-MCNC: 370 MG/DL (ref 74–99)
GLUCOSE SERPL-MCNC: 230 MG/DL (ref 74–99)
GLUCOSE UR STRIP.AUTO-MCNC: ABNORMAL MG/DL
HBA1C MFR BLD: 11 % (ref ?–5.7)
HCT VFR BLD AUTO: 47 % (ref 41–52)
HGB BLD-MCNC: 15.4 G/DL (ref 13.5–17.5)
KETONES UR STRIP.AUTO-MCNC: NEGATIVE MG/DL
LEUKOCYTE ESTERASE UR QL STRIP.AUTO: NEGATIVE
MCH RBC QN AUTO: 26.3 PG (ref 26–34)
MCHC RBC AUTO-ENTMCNC: 32.8 G/DL (ref 32–36)
MCV RBC AUTO: 80 FL (ref 80–100)
NITRITE UR QL STRIP.AUTO: NEGATIVE
NRBC BLD-RTO: 0 /100 WBCS (ref 0–0)
PH UR STRIP.AUTO: 5.5 [PH]
PLATELET # BLD AUTO: 171 X10*3/UL (ref 150–450)
POTASSIUM SERPL-SCNC: 3.9 MMOL/L (ref 3.5–5.3)
PROT SERPL-MCNC: 6.5 G/DL (ref 6.4–8.2)
PROT UR STRIP.AUTO-MCNC: NEGATIVE MG/DL
RBC # BLD AUTO: 5.86 X10*6/UL (ref 4.5–5.9)
RBC # UR STRIP.AUTO: NEGATIVE MG/DL
SODIUM SERPL-SCNC: 137 MMOL/L (ref 136–145)
SP GR UR STRIP.AUTO: 1.02
UROBILINOGEN UR STRIP.AUTO-MCNC: NORMAL MG/DL
VANCOMYCIN SERPL-MCNC: 10.5 UG/ML (ref 5–20)
WBC # BLD AUTO: 7.7 X10*3/UL (ref 4.4–11.3)

## 2025-05-22 PROCEDURE — A4649 SURGICAL SUPPLIES: HCPCS | Performed by: UROLOGY

## 2025-05-22 PROCEDURE — 36415 COLL VENOUS BLD VENIPUNCTURE: CPT | Performed by: INTERNAL MEDICINE

## 2025-05-22 PROCEDURE — 2500000002 HC RX 250 W HCPCS SELF ADMINISTERED DRUGS (ALT 637 FOR MEDICARE OP, ALT 636 FOR OP/ED): Performed by: INTERNAL MEDICINE

## 2025-05-22 PROCEDURE — 1200000002 HC GENERAL ROOM WITH TELEMETRY DAILY

## 2025-05-22 PROCEDURE — 2500000005 HC RX 250 GENERAL PHARMACY W/O HCPCS: Performed by: ANESTHESIOLOGY

## 2025-05-22 PROCEDURE — 81003 URINALYSIS AUTO W/O SCOPE: CPT | Performed by: INTERNAL MEDICINE

## 2025-05-22 PROCEDURE — 2500000004 HC RX 250 GENERAL PHARMACY W/ HCPCS (ALT 636 FOR OP/ED): Performed by: UROLOGY

## 2025-05-22 PROCEDURE — 2500000004 HC RX 250 GENERAL PHARMACY W/ HCPCS (ALT 636 FOR OP/ED): Performed by: ANESTHESIOLOGY

## 2025-05-22 PROCEDURE — 83036 HEMOGLOBIN GLYCOSYLATED A1C: CPT | Mod: WESLAB | Performed by: NURSE PRACTITIONER

## 2025-05-22 PROCEDURE — 2500000001 HC RX 250 WO HCPCS SELF ADMINISTERED DRUGS (ALT 637 FOR MEDICARE OP): Performed by: INTERNAL MEDICINE

## 2025-05-22 PROCEDURE — 85027 COMPLETE CBC AUTOMATED: CPT | Performed by: INTERNAL MEDICINE

## 2025-05-22 PROCEDURE — 3700000002 HC GENERAL ANESTHESIA TIME - EACH INCREMENTAL 1 MINUTE: Performed by: UROLOGY

## 2025-05-22 PROCEDURE — C1748 ENDOSCOPE, SINGLE, UGI: HCPCS | Performed by: UROLOGY

## 2025-05-22 PROCEDURE — 7100000001 HC RECOVERY ROOM TIME - INITIAL BASE CHARGE: Performed by: UROLOGY

## 2025-05-22 PROCEDURE — 0TJB8ZZ INSPECTION OF BLADDER, VIA NATURAL OR ARTIFICIAL OPENING ENDOSCOPIC: ICD-10-PCS | Performed by: UROLOGY

## 2025-05-22 PROCEDURE — 3600000008 HC OR TIME - EACH INCREMENTAL 1 MINUTE - PROCEDURE LEVEL THREE: Performed by: UROLOGY

## 2025-05-22 PROCEDURE — 94660 CPAP INITIATION&MGMT: CPT

## 2025-05-22 PROCEDURE — 84520 ASSAY OF UREA NITROGEN: CPT | Performed by: INTERNAL MEDICINE

## 2025-05-22 PROCEDURE — 7100000002 HC RECOVERY ROOM TIME - EACH INCREMENTAL 1 MINUTE: Performed by: UROLOGY

## 2025-05-22 PROCEDURE — 2500000004 HC RX 250 GENERAL PHARMACY W/ HCPCS (ALT 636 FOR OP/ED): Mod: JZ | Performed by: INTERNAL MEDICINE

## 2025-05-22 PROCEDURE — 93005 ELECTROCARDIOGRAM TRACING: CPT

## 2025-05-22 PROCEDURE — 87205 SMEAR GRAM STAIN: CPT | Mod: WESLAB | Performed by: NURSE PRACTITIONER

## 2025-05-22 PROCEDURE — 2720000007 HC OR 272 NO HCPCS: Performed by: UROLOGY

## 2025-05-22 PROCEDURE — 82947 ASSAY GLUCOSE BLOOD QUANT: CPT

## 2025-05-22 PROCEDURE — 3700000001 HC GENERAL ANESTHESIA TIME - INITIAL BASE CHARGE: Performed by: UROLOGY

## 2025-05-22 PROCEDURE — 3600000003 HC OR TIME - INITIAL BASE CHARGE - PROCEDURE LEVEL THREE: Performed by: UROLOGY

## 2025-05-22 PROCEDURE — 2500000004 HC RX 250 GENERAL PHARMACY W/ HCPCS (ALT 636 FOR OP/ED): Performed by: INTERNAL MEDICINE

## 2025-05-22 PROCEDURE — 2500000004 HC RX 250 GENERAL PHARMACY W/ HCPCS (ALT 636 FOR OP/ED): Performed by: ANESTHESIOLOGIST ASSISTANT

## 2025-05-22 PROCEDURE — 80202 ASSAY OF VANCOMYCIN: CPT | Performed by: INTERNAL MEDICINE

## 2025-05-22 RX ORDER — OXYCODONE HYDROCHLORIDE 5 MG/1
5 TABLET ORAL EVERY 4 HOURS PRN
Status: DISCONTINUED | OUTPATIENT
Start: 2025-05-22 | End: 2025-05-22 | Stop reason: HOSPADM

## 2025-05-22 RX ORDER — VANCOMYCIN HYDROCHLORIDE 1 G/20ML
INJECTION, POWDER, LYOPHILIZED, FOR SOLUTION INTRAVENOUS DAILY PRN
Status: DISCONTINUED | OUTPATIENT
Start: 2025-05-22 | End: 2025-05-23

## 2025-05-22 RX ORDER — HYDRALAZINE HYDROCHLORIDE 20 MG/ML
5 INJECTION INTRAMUSCULAR; INTRAVENOUS EVERY 30 MIN PRN
Status: COMPLETED | OUTPATIENT
Start: 2025-05-22 | End: 2025-05-22

## 2025-05-22 RX ORDER — LOSARTAN POTASSIUM 100 MG/1
100 TABLET ORAL DAILY
Status: DISCONTINUED | OUTPATIENT
Start: 2025-05-22 | End: 2025-05-25 | Stop reason: HOSPADM

## 2025-05-22 RX ORDER — INSULIN GLARGINE 100 [IU]/ML
80 INJECTION, SOLUTION SUBCUTANEOUS DAILY
Status: DISCONTINUED | OUTPATIENT
Start: 2025-05-23 | End: 2025-05-25 | Stop reason: HOSPADM

## 2025-05-22 RX ORDER — PHENYLEPHRINE HCL IN 0.9% NACL 1 MG/10 ML
SYRINGE (ML) INTRAVENOUS AS NEEDED
Status: DISCONTINUED | OUTPATIENT
Start: 2025-05-22 | End: 2025-05-22

## 2025-05-22 RX ORDER — METOPROLOL SUCCINATE 25 MG/1
25 TABLET, EXTENDED RELEASE ORAL DAILY
Status: DISCONTINUED | OUTPATIENT
Start: 2025-05-22 | End: 2025-05-25 | Stop reason: HOSPADM

## 2025-05-22 RX ORDER — VANCOMYCIN 1.5 G/300ML
1500 INJECTION, SOLUTION INTRAVENOUS EVERY 12 HOURS
Status: DISCONTINUED | OUTPATIENT
Start: 2025-05-22 | End: 2025-05-23

## 2025-05-22 RX ORDER — AMLODIPINE BESYLATE 10 MG/1
10 TABLET ORAL DAILY
Status: DISCONTINUED | OUTPATIENT
Start: 2025-05-22 | End: 2025-05-25 | Stop reason: HOSPADM

## 2025-05-22 RX ORDER — TALC
6 POWDER (GRAM) TOPICAL NIGHTLY PRN
Status: DISCONTINUED | OUTPATIENT
Start: 2025-05-22 | End: 2025-05-25 | Stop reason: HOSPADM

## 2025-05-22 RX ORDER — INSULIN LISPRO 100 [IU]/ML
0-15 INJECTION, SOLUTION INTRAVENOUS; SUBCUTANEOUS
Status: DISCONTINUED | OUTPATIENT
Start: 2025-05-22 | End: 2025-05-25 | Stop reason: HOSPADM

## 2025-05-22 RX ORDER — GUAIFENESIN/DEXTROMETHORPHAN 100-10MG/5
5 SYRUP ORAL EVERY 4 HOURS PRN
Status: DISCONTINUED | OUTPATIENT
Start: 2025-05-22 | End: 2025-05-25 | Stop reason: HOSPADM

## 2025-05-22 RX ORDER — ONDANSETRON HYDROCHLORIDE 2 MG/ML
4 INJECTION, SOLUTION INTRAVENOUS EVERY 8 HOURS PRN
Status: DISCONTINUED | OUTPATIENT
Start: 2025-05-22 | End: 2025-05-25 | Stop reason: HOSPADM

## 2025-05-22 RX ORDER — DEXTROSE 50 % IN WATER (D50W) INTRAVENOUS SYRINGE
25
Status: DISCONTINUED | OUTPATIENT
Start: 2025-05-22 | End: 2025-05-25 | Stop reason: HOSPADM

## 2025-05-22 RX ORDER — SUCCINYLCHOLINE CHLORIDE 20 MG/ML
INJECTION INTRAMUSCULAR; INTRAVENOUS AS NEEDED
Status: DISCONTINUED | OUTPATIENT
Start: 2025-05-22 | End: 2025-05-22

## 2025-05-22 RX ORDER — DIPHENHYDRAMINE HYDROCHLORIDE 50 MG/ML
12.5 INJECTION, SOLUTION INTRAMUSCULAR; INTRAVENOUS ONCE AS NEEDED
Status: COMPLETED | OUTPATIENT
Start: 2025-05-22 | End: 2025-05-22

## 2025-05-22 RX ORDER — GUAIFENESIN 600 MG/1
600 TABLET, EXTENDED RELEASE ORAL EVERY 12 HOURS PRN
Status: DISCONTINUED | OUTPATIENT
Start: 2025-05-22 | End: 2025-05-25 | Stop reason: HOSPADM

## 2025-05-22 RX ORDER — ROSUVASTATIN CALCIUM 20 MG/1
40 TABLET, COATED ORAL DAILY
Status: DISCONTINUED | OUTPATIENT
Start: 2025-05-22 | End: 2025-05-25 | Stop reason: HOSPADM

## 2025-05-22 RX ORDER — POLYETHYLENE GLYCOL 3350 17 G/17G
17 POWDER, FOR SOLUTION ORAL DAILY PRN
Status: DISCONTINUED | OUTPATIENT
Start: 2025-05-22 | End: 2025-05-25 | Stop reason: HOSPADM

## 2025-05-22 RX ORDER — ONDANSETRON HYDROCHLORIDE 2 MG/ML
4 INJECTION, SOLUTION INTRAVENOUS ONCE AS NEEDED
Status: COMPLETED | OUTPATIENT
Start: 2025-05-22 | End: 2025-05-22

## 2025-05-22 RX ORDER — VANCOMYCIN 2 G/400ML
2 INJECTION, SOLUTION INTRAVENOUS ONCE
Status: COMPLETED | OUTPATIENT
Start: 2025-05-22 | End: 2025-05-22

## 2025-05-22 RX ORDER — ACETAMINOPHEN 650 MG/1
650 SUPPOSITORY RECTAL EVERY 4 HOURS PRN
Status: DISCONTINUED | OUTPATIENT
Start: 2025-05-22 | End: 2025-05-25 | Stop reason: HOSPADM

## 2025-05-22 RX ORDER — ROCURONIUM BROMIDE 10 MG/ML
INJECTION, SOLUTION INTRAVENOUS AS NEEDED
Status: DISCONTINUED | OUTPATIENT
Start: 2025-05-22 | End: 2025-05-22

## 2025-05-22 RX ORDER — FENTANYL CITRATE 50 UG/ML
INJECTION, SOLUTION INTRAMUSCULAR; INTRAVENOUS AS NEEDED
Status: DISCONTINUED | OUTPATIENT
Start: 2025-05-22 | End: 2025-05-22

## 2025-05-22 RX ORDER — HYDROMORPHONE HYDROCHLORIDE 0.2 MG/ML
0.2 INJECTION INTRAMUSCULAR; INTRAVENOUS; SUBCUTANEOUS EVERY 5 MIN PRN
Status: DISCONTINUED | OUTPATIENT
Start: 2025-05-22 | End: 2025-05-22 | Stop reason: HOSPADM

## 2025-05-22 RX ORDER — ACETAMINOPHEN 325 MG/1
650 TABLET ORAL EVERY 4 HOURS PRN
Status: DISCONTINUED | OUTPATIENT
Start: 2025-05-22 | End: 2025-05-25 | Stop reason: HOSPADM

## 2025-05-22 RX ORDER — IPRATROPIUM BROMIDE 0.5 MG/2.5ML
500 SOLUTION RESPIRATORY (INHALATION) ONCE
Status: DISCONTINUED | OUTPATIENT
Start: 2025-05-22 | End: 2025-05-22 | Stop reason: HOSPADM

## 2025-05-22 RX ORDER — ONDANSETRON 4 MG/1
4 TABLET, FILM COATED ORAL EVERY 8 HOURS PRN
Status: DISCONTINUED | OUTPATIENT
Start: 2025-05-22 | End: 2025-05-25 | Stop reason: HOSPADM

## 2025-05-22 RX ORDER — PROPOFOL 10 MG/ML
INJECTION, EMULSION INTRAVENOUS AS NEEDED
Status: DISCONTINUED | OUTPATIENT
Start: 2025-05-22 | End: 2025-05-22

## 2025-05-22 RX ORDER — DEXTROSE 50 % IN WATER (D50W) INTRAVENOUS SYRINGE
12.5
Status: DISCONTINUED | OUTPATIENT
Start: 2025-05-22 | End: 2025-05-25 | Stop reason: HOSPADM

## 2025-05-22 RX ORDER — MORPHINE SULFATE 2 MG/ML
2 INJECTION, SOLUTION INTRAMUSCULAR; INTRAVENOUS EVERY 4 HOURS PRN
Status: DISCONTINUED | OUTPATIENT
Start: 2025-05-22 | End: 2025-05-25 | Stop reason: HOSPADM

## 2025-05-22 RX ORDER — SODIUM CHLORIDE, SODIUM LACTATE, POTASSIUM CHLORIDE, CALCIUM CHLORIDE 600; 310; 30; 20 MG/100ML; MG/100ML; MG/100ML; MG/100ML
INJECTION, SOLUTION INTRAVENOUS CONTINUOUS PRN
Status: DISCONTINUED | OUTPATIENT
Start: 2025-05-22 | End: 2025-05-22

## 2025-05-22 RX ORDER — ALBUTEROL SULFATE 0.83 MG/ML
2.5 SOLUTION RESPIRATORY (INHALATION) ONCE AS NEEDED
Status: DISCONTINUED | OUTPATIENT
Start: 2025-05-22 | End: 2025-05-22 | Stop reason: HOSPADM

## 2025-05-22 RX ORDER — ACETAMINOPHEN 160 MG/5ML
650 SOLUTION ORAL EVERY 4 HOURS PRN
Status: DISCONTINUED | OUTPATIENT
Start: 2025-05-22 | End: 2025-05-25 | Stop reason: HOSPADM

## 2025-05-22 RX ORDER — INSULIN GLARGINE 100 [IU]/ML
80 INJECTION, SOLUTION SUBCUTANEOUS NIGHTLY
Status: DISCONTINUED | OUTPATIENT
Start: 2025-05-22 | End: 2025-05-22

## 2025-05-22 RX ORDER — LIDOCAINE HYDROCHLORIDE 20 MG/ML
INJECTION, SOLUTION EPIDURAL; INFILTRATION; INTRACAUDAL; PERINEURAL AS NEEDED
Status: DISCONTINUED | OUTPATIENT
Start: 2025-05-22 | End: 2025-05-22

## 2025-05-22 RX ORDER — HEPARIN SODIUM 5000 [USP'U]/ML
7500 INJECTION, SOLUTION INTRAVENOUS; SUBCUTANEOUS EVERY 8 HOURS SCHEDULED
Status: DISCONTINUED | OUTPATIENT
Start: 2025-05-22 | End: 2025-05-25 | Stop reason: HOSPADM

## 2025-05-22 RX ORDER — MEPERIDINE HYDROCHLORIDE 25 MG/ML
12.5 INJECTION INTRAMUSCULAR; INTRAVENOUS; SUBCUTANEOUS EVERY 10 MIN PRN
Status: DISCONTINUED | OUTPATIENT
Start: 2025-05-22 | End: 2025-05-22 | Stop reason: HOSPADM

## 2025-05-22 RX ORDER — METOCLOPRAMIDE HYDROCHLORIDE 5 MG/ML
10 INJECTION INTRAMUSCULAR; INTRAVENOUS ONCE
Status: COMPLETED | OUTPATIENT
Start: 2025-05-22 | End: 2025-05-22

## 2025-05-22 RX ORDER — FAMOTIDINE 10 MG/ML
20 INJECTION, SOLUTION INTRAVENOUS ONCE
Status: COMPLETED | OUTPATIENT
Start: 2025-05-22 | End: 2025-05-22

## 2025-05-22 RX ORDER — MIDAZOLAM HYDROCHLORIDE 1 MG/ML
INJECTION, SOLUTION INTRAMUSCULAR; INTRAVENOUS AS NEEDED
Status: DISCONTINUED | OUTPATIENT
Start: 2025-05-22 | End: 2025-05-22

## 2025-05-22 RX ADMIN — PROPOFOL 200 MG: 10 INJECTION, EMULSION INTRAVENOUS at 16:07

## 2025-05-22 RX ADMIN — ROCURONIUM BROMIDE 40 MG: 10 INJECTION, SOLUTION INTRAVENOUS at 16:10

## 2025-05-22 RX ADMIN — HYDROMORPHONE HYDROCHLORIDE 0.8 MG: 2 INJECTION, SOLUTION INTRAMUSCULAR; INTRAVENOUS; SUBCUTANEOUS at 16:53

## 2025-05-22 RX ADMIN — PIPERACILLIN SODIUM AND TAZOBACTAM SODIUM 4.5 G: 4; .5 INJECTION, SOLUTION INTRAVENOUS at 06:50

## 2025-05-22 RX ADMIN — SODIUM CHLORIDE, POTASSIUM CHLORIDE, SODIUM LACTATE AND CALCIUM CHLORIDE: 600; 310; 30; 20 INJECTION, SOLUTION INTRAVENOUS at 15:55

## 2025-05-22 RX ADMIN — Medication 150 MCG: at 16:20

## 2025-05-22 RX ADMIN — HEPARIN SODIUM 7500 UNITS: 5000 INJECTION INTRAVENOUS; SUBCUTANEOUS at 21:40

## 2025-05-22 RX ADMIN — INSULIN LISPRO 6 UNITS: 100 INJECTION, SOLUTION INTRAVENOUS; SUBCUTANEOUS at 09:25

## 2025-05-22 RX ADMIN — DIPHENHYDRAMINE HYDROCHLORIDE 12.5 MG: 50 INJECTION, SOLUTION INTRAMUSCULAR; INTRAVENOUS at 19:03

## 2025-05-22 RX ADMIN — MIDAZOLAM 2 MG: 1 INJECTION INTRAMUSCULAR; INTRAVENOUS at 15:55

## 2025-05-22 RX ADMIN — FENTANYL CITRATE 50 MCG: 50 INJECTION, SOLUTION INTRAMUSCULAR; INTRAVENOUS at 16:10

## 2025-05-22 RX ADMIN — MORPHINE SULFATE 2 MG: 2 INJECTION, SOLUTION INTRAMUSCULAR; INTRAVENOUS at 01:52

## 2025-05-22 RX ADMIN — ROCURONIUM BROMIDE 10 MG: 10 INJECTION, SOLUTION INTRAVENOUS at 16:44

## 2025-05-22 RX ADMIN — LIDOCAINE HYDROCHLORIDE 50 MG: 20 INJECTION, SOLUTION EPIDURAL; INFILTRATION; INTRACAUDAL; PERINEURAL at 16:07

## 2025-05-22 RX ADMIN — ROCURONIUM BROMIDE 10 MG: 10 INJECTION, SOLUTION INTRAVENOUS at 17:02

## 2025-05-22 RX ADMIN — HYDROMORPHONE HYDROCHLORIDE 0.5 MG: 0.5 INJECTION, SOLUTION INTRAMUSCULAR; INTRAVENOUS; SUBCUTANEOUS at 18:29

## 2025-05-22 RX ADMIN — VANCOMYCIN 1.5 G: 1.5 INJECTION, SOLUTION INTRAVENOUS at 13:37

## 2025-05-22 RX ADMIN — ONDANSETRON 4 MG: 2 INJECTION, SOLUTION INTRAMUSCULAR; INTRAVENOUS at 16:15

## 2025-05-22 RX ADMIN — HYDRALAZINE HYDROCHLORIDE 5 MG: 20 INJECTION INTRAMUSCULAR; INTRAVENOUS at 18:45

## 2025-05-22 RX ADMIN — INSULIN LISPRO 3 UNITS: 100 INJECTION, SOLUTION INTRAVENOUS; SUBCUTANEOUS at 12:39

## 2025-05-22 RX ADMIN — METOPROLOL SUCCINATE 25 MG: 25 TABLET, EXTENDED RELEASE ORAL at 09:24

## 2025-05-22 RX ADMIN — AMLODIPINE BESYLATE 10 MG: 10 TABLET ORAL at 09:24

## 2025-05-22 RX ADMIN — HYDRALAZINE HYDROCHLORIDE 5 MG: 20 INJECTION INTRAMUSCULAR; INTRAVENOUS at 18:14

## 2025-05-22 RX ADMIN — SUGAMMADEX 200 MG: 100 INJECTION, SOLUTION INTRAVENOUS at 17:40

## 2025-05-22 RX ADMIN — MORPHINE SULFATE 2 MG: 2 INJECTION, SOLUTION INTRAMUSCULAR; INTRAVENOUS at 21:39

## 2025-05-22 RX ADMIN — ONDANSETRON 4 MG: 2 INJECTION, SOLUTION INTRAMUSCULAR; INTRAVENOUS at 19:04

## 2025-05-22 RX ADMIN — FAMOTIDINE 20 MG: 10 INJECTION, SOLUTION INTRAVENOUS at 15:39

## 2025-05-22 RX ADMIN — Medication 4 L/MIN: at 23:53

## 2025-05-22 RX ADMIN — HYDROMORPHONE HYDROCHLORIDE 0.5 MG: 0.5 INJECTION, SOLUTION INTRAMUSCULAR; INTRAVENOUS; SUBCUTANEOUS at 18:38

## 2025-05-22 RX ADMIN — HYDROMORPHONE HYDROCHLORIDE 0.5 MG: 0.5 INJECTION, SOLUTION INTRAMUSCULAR; INTRAVENOUS; SUBCUTANEOUS at 18:53

## 2025-05-22 RX ADMIN — METOCLOPRAMIDE 10 MG: 5 INJECTION, SOLUTION INTRAMUSCULAR; INTRAVENOUS at 15:42

## 2025-05-22 RX ADMIN — HEPARIN SODIUM 7500 UNITS: 5000 INJECTION INTRAVENOUS; SUBCUTANEOUS at 01:39

## 2025-05-22 RX ADMIN — VANCOMYCIN 2 G: 2 INJECTION, SOLUTION INTRAVENOUS at 01:39

## 2025-05-22 RX ADMIN — PIPERACILLIN SODIUM AND TAZOBACTAM SODIUM 4.5 G: 4; .5 INJECTION, SOLUTION INTRAVENOUS at 12:39

## 2025-05-22 RX ADMIN — FENTANYL CITRATE 50 MCG: 50 INJECTION, SOLUTION INTRAMUSCULAR; INTRAVENOUS at 16:36

## 2025-05-22 RX ADMIN — SUCCINYLCHOLINE CHLORIDE 120 MG: 20 INJECTION, SOLUTION INTRAMUSCULAR; INTRAVENOUS at 16:07

## 2025-05-22 RX ADMIN — LOSARTAN POTASSIUM 100 MG: 100 TABLET, FILM COATED ORAL at 09:24

## 2025-05-22 RX ADMIN — HYDROMORPHONE HYDROCHLORIDE 0.4 MG: 2 INJECTION, SOLUTION INTRAMUSCULAR; INTRAVENOUS; SUBCUTANEOUS at 17:31

## 2025-05-22 RX ADMIN — HYDROMORPHONE HYDROCHLORIDE 0.8 MG: 2 INJECTION, SOLUTION INTRAMUSCULAR; INTRAVENOUS; SUBCUTANEOUS at 17:15

## 2025-05-22 RX ADMIN — ROSUVASTATIN 40 MG: 20 TABLET, FILM COATED ORAL at 09:24

## 2025-05-22 SDOH — SOCIAL STABILITY: SOCIAL INSECURITY: DOES ANYONE TRY TO KEEP YOU FROM HAVING/CONTACTING OTHER FRIENDS OR DOING THINGS OUTSIDE YOUR HOME?: NO

## 2025-05-22 SDOH — ECONOMIC STABILITY: INCOME INSECURITY: IN THE PAST 12 MONTHS HAS THE ELECTRIC, GAS, OIL, OR WATER COMPANY THREATENED TO SHUT OFF SERVICES IN YOUR HOME?: NO

## 2025-05-22 SDOH — ECONOMIC STABILITY: HOUSING INSECURITY: IN THE PAST 12 MONTHS, HOW MANY TIMES HAVE YOU MOVED WHERE YOU WERE LIVING?: 0

## 2025-05-22 SDOH — SOCIAL STABILITY: SOCIAL INSECURITY: WITHIN THE LAST YEAR, HAVE YOU BEEN HUMILIATED OR EMOTIONALLY ABUSED IN OTHER WAYS BY YOUR PARTNER OR EX-PARTNER?: NO

## 2025-05-22 SDOH — HEALTH STABILITY: PHYSICAL HEALTH: ON AVERAGE, HOW MANY DAYS PER WEEK DO YOU ENGAGE IN MODERATE TO STRENUOUS EXERCISE (LIKE A BRISK WALK)?: 0 DAYS

## 2025-05-22 SDOH — SOCIAL STABILITY: SOCIAL NETWORK: HOW OFTEN DO YOU ATTEND CHURCH OR RELIGIOUS SERVICES?: NEVER

## 2025-05-22 SDOH — HEALTH STABILITY: MENTAL HEALTH: CURRENT SMOKER: 1

## 2025-05-22 SDOH — SOCIAL STABILITY: SOCIAL INSECURITY: ARE YOU OR HAVE YOU BEEN THREATENED OR ABUSED PHYSICALLY, EMOTIONALLY, OR SEXUALLY BY ANYONE?: NO

## 2025-05-22 SDOH — HEALTH STABILITY: PHYSICAL HEALTH
HOW OFTEN DO YOU NEED TO HAVE SOMEONE HELP YOU WHEN YOU READ INSTRUCTIONS, PAMPHLETS, OR OTHER WRITTEN MATERIAL FROM YOUR DOCTOR OR PHARMACY?: NEVER

## 2025-05-22 SDOH — ECONOMIC STABILITY: HOUSING INSECURITY: IN THE LAST 12 MONTHS, WAS THERE A TIME WHEN YOU WERE NOT ABLE TO PAY THE MORTGAGE OR RENT ON TIME?: NO

## 2025-05-22 SDOH — SOCIAL STABILITY: SOCIAL INSECURITY: DO YOU FEEL UNSAFE GOING BACK TO THE PLACE WHERE YOU ARE LIVING?: NO

## 2025-05-22 SDOH — HEALTH STABILITY: MENTAL HEALTH
DO YOU FEEL STRESS - TENSE, RESTLESS, NERVOUS, OR ANXIOUS, OR UNABLE TO SLEEP AT NIGHT BECAUSE YOUR MIND IS TROUBLED ALL THE TIME - THESE DAYS?: NOT AT ALL

## 2025-05-22 SDOH — SOCIAL STABILITY: SOCIAL NETWORK
IN A TYPICAL WEEK, HOW MANY TIMES DO YOU TALK ON THE PHONE WITH FAMILY, FRIENDS, OR NEIGHBORS?: MORE THAN THREE TIMES A WEEK

## 2025-05-22 SDOH — SOCIAL STABILITY: SOCIAL NETWORK: HOW OFTEN DO YOU ATTEND MEETINGS OF THE CLUBS OR ORGANIZATIONS YOU BELONG TO?: NEVER

## 2025-05-22 SDOH — ECONOMIC STABILITY: FOOD INSECURITY: WITHIN THE PAST 12 MONTHS, YOU WORRIED THAT YOUR FOOD WOULD RUN OUT BEFORE YOU GOT THE MONEY TO BUY MORE.: NEVER TRUE

## 2025-05-22 SDOH — SOCIAL STABILITY: SOCIAL INSECURITY: WERE YOU ABLE TO COMPLETE ALL THE BEHAVIORAL HEALTH SCREENINGS?: YES

## 2025-05-22 SDOH — SOCIAL STABILITY: SOCIAL NETWORK
DO YOU BELONG TO ANY CLUBS OR ORGANIZATIONS SUCH AS CHURCH GROUPS, UNIONS, FRATERNAL OR ATHLETIC GROUPS, OR SCHOOL GROUPS?: NO

## 2025-05-22 SDOH — ECONOMIC STABILITY: FOOD INSECURITY: WITHIN THE PAST 12 MONTHS, THE FOOD YOU BOUGHT JUST DIDN'T LAST AND YOU DIDN'T HAVE MONEY TO GET MORE.: NEVER TRUE

## 2025-05-22 SDOH — SOCIAL STABILITY: SOCIAL INSECURITY: ARE YOU MARRIED, WIDOWED, DIVORCED, SEPARATED, NEVER MARRIED, OR LIVING WITH A PARTNER?: NEVER MARRIED

## 2025-05-22 SDOH — SOCIAL STABILITY: SOCIAL INSECURITY: WITHIN THE LAST YEAR, HAVE YOU BEEN AFRAID OF YOUR PARTNER OR EX-PARTNER?: NO

## 2025-05-22 SDOH — ECONOMIC STABILITY: HOUSING INSECURITY: AT ANY TIME IN THE PAST 12 MONTHS, WERE YOU HOMELESS OR LIVING IN A SHELTER (INCLUDING NOW)?: NO

## 2025-05-22 SDOH — SOCIAL STABILITY: SOCIAL INSECURITY: ARE THERE ANY APPARENT SIGNS OF INJURIES/BEHAVIORS THAT COULD BE RELATED TO ABUSE/NEGLECT?: NO

## 2025-05-22 SDOH — SOCIAL STABILITY: SOCIAL INSECURITY: HAS ANYONE EVER THREATENED TO HURT YOUR FAMILY OR YOUR PETS?: NO

## 2025-05-22 SDOH — ECONOMIC STABILITY: FOOD INSECURITY: HOW HARD IS IT FOR YOU TO PAY FOR THE VERY BASICS LIKE FOOD, HOUSING, MEDICAL CARE, AND HEATING?: NOT HARD AT ALL

## 2025-05-22 SDOH — SOCIAL STABILITY: SOCIAL NETWORK: HOW OFTEN DO YOU GET TOGETHER WITH FRIENDS OR RELATIVES?: ONCE A WEEK

## 2025-05-22 SDOH — SOCIAL STABILITY: SOCIAL INSECURITY: DO YOU FEEL ANYONE HAS EXPLOITED OR TAKEN ADVANTAGE OF YOU FINANCIALLY OR OF YOUR PERSONAL PROPERTY?: NO

## 2025-05-22 SDOH — SOCIAL STABILITY: SOCIAL INSECURITY: HAVE YOU HAD ANY THOUGHTS OF HARMING ANYONE ELSE?: NO

## 2025-05-22 SDOH — ECONOMIC STABILITY: TRANSPORTATION INSECURITY: IN THE PAST 12 MONTHS, HAS LACK OF TRANSPORTATION KEPT YOU FROM MEDICAL APPOINTMENTS OR FROM GETTING MEDICATIONS?: NO

## 2025-05-22 SDOH — SOCIAL STABILITY: SOCIAL INSECURITY: ABUSE: ADULT

## 2025-05-22 SDOH — SOCIAL STABILITY: SOCIAL INSECURITY: HAVE YOU HAD THOUGHTS OF HARMING ANYONE ELSE?: NO

## 2025-05-22 ASSESSMENT — COGNITIVE AND FUNCTIONAL STATUS - GENERAL
DAILY ACTIVITIY SCORE: 23
MOBILITY SCORE: 24
MOBILITY SCORE: 24
EATING MEALS: A LITTLE
DAILY ACTIVITIY SCORE: 24
MOBILITY SCORE: 24
DAILY ACTIVITIY SCORE: 24

## 2025-05-22 ASSESSMENT — PAIN - FUNCTIONAL ASSESSMENT
PAIN_FUNCTIONAL_ASSESSMENT: 0-10
PAIN_FUNCTIONAL_ASSESSMENT: CPOT (CRITICAL CARE PAIN OBSERVATION TOOL)
PAIN_FUNCTIONAL_ASSESSMENT: 0-10
PAIN_FUNCTIONAL_ASSESSMENT: 0-10
PAIN_FUNCTIONAL_ASSESSMENT: FLACC (FACE, LEGS, ACTIVITY, CRY, CONSOLABILITY)
PAIN_FUNCTIONAL_ASSESSMENT: 0-10

## 2025-05-22 ASSESSMENT — PAIN SCALES - GENERAL
PAINLEVEL_OUTOF10: 0 - NO PAIN
PAINLEVEL_OUTOF10: 0 - NO PAIN
PAINLEVEL_OUTOF10: 10 - WORST POSSIBLE PAIN
PAINLEVEL_OUTOF10: 8
PAINLEVEL_OUTOF10: 2
PAIN_LEVEL: 4
PAINLEVEL_OUTOF10: 0 - NO PAIN
PAINLEVEL_OUTOF10: 8
PAINLEVEL_OUTOF10: 8
PAINLEVEL_OUTOF10: 3
PAINLEVEL_OUTOF10: 8

## 2025-05-22 ASSESSMENT — LIFESTYLE VARIABLES
SKIP TO QUESTIONS 9-10: 1
AUDIT-C TOTAL SCORE: 0
HOW MANY STANDARD DRINKS CONTAINING ALCOHOL DO YOU HAVE ON A TYPICAL DAY: PATIENT DOES NOT DRINK
SUBSTANCE_ABUSE_PAST_12_MONTHS: NO
PRESCIPTION_ABUSE_PAST_12_MONTHS: NO
HOW OFTEN DO YOU HAVE A DRINK CONTAINING ALCOHOL: NEVER
HOW OFTEN DO YOU HAVE 6 OR MORE DRINKS ON ONE OCCASION: NEVER
AUDIT-C TOTAL SCORE: 0

## 2025-05-22 ASSESSMENT — PAIN DESCRIPTION - DESCRIPTORS: DESCRIPTORS: SHARP

## 2025-05-22 ASSESSMENT — ACTIVITIES OF DAILY LIVING (ADL): LACK_OF_TRANSPORTATION: NO

## 2025-05-22 ASSESSMENT — PAIN DESCRIPTION - ORIENTATION: ORIENTATION: MID

## 2025-05-22 ASSESSMENT — PAIN DESCRIPTION - LOCATION: LOCATION: RECTUM

## 2025-05-22 NOTE — H&P
Chief Complaint Groin pain    History Of Present Illness  Elliot Earl is a very pleasant 54 y.o. male  with a past medical history significant for urethral scrotal and perineal abscesses and urethral stricture status post urethroplasty, presenting with left groin pain.  He was in his usual state of health until the date of presentation.  He states he was at work when he developed a throbbing pain in his groin.  The pain persisted.  He denies any subjective fevers chills or sweats.  He presented to the emergency department for evaluation.  In the emergency department, initial work-up was done.  Temperature 36.4 °C.  Pulse 94.  Respiratory rate 18.  Blood pressure 137/90.  Pulse oximetry 97% on room air.  CMP showed a glucose 189.  BUN 11.  Creatinine 1.01.  CBC showed white blood cell count 12.3.  Hemoglobin 15.8.  Hematocrit 48.9.  Platelet count 181.  CT pelvis with IV contrast per radiology showed just medial to the left ischial cavernosus muscle, there is a redemonstration of a 5.8 cm x 1.6 cm x 4.4 cm -to the extent visualized on the prior study this is similar in size, previously measuring 4.6 cm x 1.9 cm in transaxial dimension, significantly smaller on 10/22/2024, larger on 10/18/2024, location appears to be between the left corpus cavernosum and corpus spongiosum deep to the investing fascia of the penis, findings likely related to chronic recurring periurethral abscess or sterile fluid collection due to potential chronic urethral fistula, there is a scar tissue tract extending from this collection into the perineal soft tissues indicating that this may have represented a previous active fistula tract to the skin surface, urology follow-up is recommended.  He was treated in the emergency department with broad-spectrum IV antibiotics.  He was admitted.  At the time of my evaluation, he is resting in bed in no acute distress.  He reports perineal pain and penile drainage.  He  reports numbness/tingling sensation in his hands left > right which is new.  He denies any associated symptoms.  He denies any other complaints at this time.  He is currently NPO for possible Urology procedure today.      Past Medical History  Medical History[1]    Surgical History  Surgical History[2]     Social History  He reports that he has been smoking cigarettes. He has a 20 pack-year smoking history. He does not have any smokeless tobacco history on file. He reports current alcohol use. He reports that he does not currently use drugs after having used the following drugs: Marijuana.    Family History  Family History[3]     Allergies  Patient has no known allergies.    Review of Systems   Constitutional: Negative.    HENT: Negative.     Eyes: Negative.    Respiratory: Negative.     Cardiovascular: Negative.    Gastrointestinal: Negative.    Endocrine: Negative.    Genitourinary:  Positive for penile discharge.   Musculoskeletal: Negative.    Skin:  Positive for wound.   Allergic/Immunologic: Negative.    Neurological:  Positive for numbness.   Hematological: Negative.    Psychiatric/Behavioral: Negative.     All other systems reviewed and are negative.       Physical Exam  Vitals and nursing note reviewed. Exam conducted with a chaperone present.   Constitutional:       General: He is not in acute distress.     Appearance: He is obese. He is not ill-appearing, toxic-appearing or diaphoretic.      Comments: RN present for  examination.    HENT:      Head: Normocephalic and atraumatic.      Right Ear: External ear normal.      Left Ear: External ear normal.      Nose: Nose normal.      Mouth/Throat:      Mouth: Mucous membranes are moist.      Pharynx: Oropharynx is clear.   Eyes:      Extraocular Movements: Extraocular movements intact.      Conjunctiva/sclera: Conjunctivae normal.      Pupils: Pupils are equal, round, and reactive to light.   Cardiovascular:      Rate and Rhythm: Normal rate and regular  "rhythm.      Pulses: Normal pulses.      Heart sounds: Normal heart sounds. No murmur heard.  Pulmonary:      Effort: Pulmonary effort is normal. No respiratory distress.      Breath sounds: Normal breath sounds. No wheezing, rhonchi or rales.      Comments: Room air.  Abdominal:      General: Bowel sounds are normal. There is no distension.      Palpations: Abdomen is soft.      Tenderness: There is no abdominal tenderness. There is no guarding.   Genitourinary:     Penis: Tenderness, discharge and swelling present.       Comments: Perineum tender. Penile light red / pink milky purulent discharge. Rectal examination deferred.   Musculoskeletal:         General: No swelling or tenderness. Normal range of motion.      Cervical back: Normal range of motion and neck supple.   Skin:     General: Skin is warm and dry.      Capillary Refill: Capillary refill takes less than 2 seconds.   Neurological:      General: No focal deficit present.      Mental Status: He is alert and oriented to person, place, and time.   Psychiatric:         Mood and Affect: Mood normal.         Behavior: Behavior normal.       Last Recorded Vitals  Blood pressure (!) 152/96, pulse 87, temperature 37.2 °C (99 °F), temperature source Oral, resp. rate 17, height 1.702 m (5' 7\"), weight 125 kg (275 lb), SpO2 93%.    Relevant Results  Results for orders placed or performed during the hospital encounter of 05/21/25 (from the past 24 hours)   CBC and Auto Differential   Result Value Ref Range    WBC 12.3 (H) 4.4 - 11.3 x10*3/uL    nRBC 0.0 0.0 - 0.0 /100 WBCs    RBC 6.01 (H) 4.50 - 5.90 x10*6/uL    Hemoglobin 15.8 13.5 - 17.5 g/dL    Hematocrit 48.9 41.0 - 52.0 %    MCV 81 80 - 100 fL    MCH 26.3 26.0 - 34.0 pg    MCHC 32.3 32.0 - 36.0 g/dL    RDW 14.5 11.5 - 14.5 %    Platelets 181 150 - 450 x10*3/uL    Immature Granulocytes %, Automated 0.5 0.0 - 0.9 %    Immature Granulocytes Absolute, Automated 0.06 0.00 - 0.70 x10*3/uL   Comprehensive metabolic " panel   Result Value Ref Range    Glucose 189 (H) 74 - 99 mg/dL    Sodium 137 136 - 145 mmol/L    Potassium 4.3 3.5 - 5.3 mmol/L    Chloride 103 98 - 107 mmol/L    Bicarbonate 28 21 - 32 mmol/L    Anion Gap 10 10 - 20 mmol/L    Urea Nitrogen 11 6 - 23 mg/dL    Creatinine 1.01 0.50 - 1.30 mg/dL    eGFR 89 >60 mL/min/1.73m*2    Calcium 9.6 8.6 - 10.3 mg/dL    Albumin 4.3 3.4 - 5.0 g/dL    Alkaline Phosphatase 74 33 - 120 U/L    Total Protein 7.3 6.4 - 8.2 g/dL    AST 11 9 - 39 U/L    Bilirubin, Total 0.6 0.0 - 1.2 mg/dL    ALT 18 10 - 52 U/L   Magnesium   Result Value Ref Range    Magnesium 1.92 1.60 - 2.40 mg/dL   Manual Differential   Result Value Ref Range    Neutrophils %, Manual 64.0 40.0 - 80.0 %    Lymphocytes %, Manual 24.0 13.0 - 44.0 %    Monocytes %, Manual 7.0 2.0 - 10.0 %    Eosinophils %, Manual 1.0 0.0 - 6.0 %    Basophils %, Manual 4.0 0.0 - 2.0 %    Seg Neutrophils Absolute, Manual 7.87 (H) 1.20 - 7.00 x10*3/uL    Lymphocytes Absolute, Manual 2.95 1.20 - 4.80 x10*3/uL    Monocytes Absolute, Manual 0.86 0.10 - 1.00 x10*3/uL    Eosinophils Absolute, Manual 0.12 0.00 - 0.70 x10*3/uL    Basophils Absolute, Manual 0.49 (H) 0.00 - 0.10 x10*3/uL    Total Cells Counted 100     RBC Morphology See Below     Polychromasia Mild    POCT GLUCOSE   Result Value Ref Range    POCT Glucose 370 (H) 74 - 99 mg/dL   Urinalysis with Reflex Microscopic   Result Value Ref Range    Color, Urine Colorless (N) Light-Yellow, Yellow, Dark-Yellow    Appearance, Urine Clear Clear    Specific Gravity, Urine 1.020 1.005 - 1.035    pH, Urine 5.5 5.0, 5.5, 6.0, 6.5, 7.0, 7.5, 8.0    Protein, Urine NEGATIVE NEGATIVE, 10 (TRACE), 20 (TRACE) mg/dL    Glucose, Urine OVER (4+) (A) Normal mg/dL    Blood, Urine NEGATIVE NEGATIVE mg/dL    Ketones, Urine NEGATIVE NEGATIVE mg/dL    Bilirubin, Urine NEGATIVE NEGATIVE mg/dL    Urobilinogen, Urine Normal Normal mg/dL    Nitrite, Urine NEGATIVE NEGATIVE    Leukocyte Esterase, Urine NEGATIVE NEGATIVE    CBC   Result Value Ref Range    WBC 7.7 4.4 - 11.3 x10*3/uL    nRBC 0.0 0.0 - 0.0 /100 WBCs    RBC 5.86 4.50 - 5.90 x10*6/uL    Hemoglobin 15.4 13.5 - 17.5 g/dL    Hematocrit 47.0 41.0 - 52.0 %    MCV 80 80 - 100 fL    MCH 26.3 26.0 - 34.0 pg    MCHC 32.8 32.0 - 36.0 g/dL    RDW 14.6 (H) 11.5 - 14.5 %    Platelets 171 150 - 450 x10*3/uL   Comprehensive metabolic panel   Result Value Ref Range    Glucose 230 (H) 74 - 99 mg/dL    Sodium 137 136 - 145 mmol/L    Potassium 3.9 3.5 - 5.3 mmol/L    Chloride 105 98 - 107 mmol/L    Bicarbonate 25 21 - 32 mmol/L    Anion Gap 11 10 - 20 mmol/L    Urea Nitrogen 10 6 - 23 mg/dL    Creatinine 0.92 0.50 - 1.30 mg/dL    eGFR >90 >60 mL/min/1.73m*2    Calcium 8.8 8.6 - 10.3 mg/dL    Albumin 3.9 3.4 - 5.0 g/dL    Alkaline Phosphatase 68 33 - 120 U/L    Total Protein 6.5 6.4 - 8.2 g/dL    AST 9 9 - 39 U/L    Bilirubin, Total 0.4 0.0 - 1.2 mg/dL    ALT 15 10 - 52 U/L   Hemoglobin A1c   Result Value Ref Range    Hemoglobin A1C 11.0 (H) See comment %    Estimated Average Glucose 269 Not Established mg/dL   POCT GLUCOSE   Result Value Ref Range    POCT Glucose 214 (H) 74 - 99 mg/dL   Vancomycin   Result Value Ref Range    Vancomycin 10.5 5.0 - 20.0 ug/mL   POCT GLUCOSE   Result Value Ref Range    POCT Glucose 176 (H) 74 - 99 mg/dL     No results found for the last 90 days.    CT pelvis w IV contrast  Result Date: 5/21/2025  Interpreted By:  Perry Cassidy, STUDY: CT PELVIS W IV CONTRAST;  5/21/2025 8:32 pm   INDICATION: Signs/Symptoms:abscess perineum.     COMPARISON: 02/10/2025 and multiple prior CT abdomen/pelvis exams.   ACCESSION NUMBER(S): [id number]   ORDERING CLINICIAN: LEV KANDY   TECHNIQUE: Axial CT images of the pelvis were obtained after the intravenous administration of iodinated contrast. Coronal and sagittal reformatted images were reconstructed from the axial data. 3D images were not performed at time of dictation therefore not used during interpretation.    FINDINGS: OSSEOUS STRUCTURES: No acute fracture or malalignment. Remote right hamstring tendon strain noted.     SOFT TISSUES: Just medial to the left ischiocavernosus muscle, there is redemonstration of a 5.8 cm x 1.6 cm (transaxial) x 4.4 cm (craniocaudal). To the extent visualized on the prior study this is similar in size, previously measuring ~ 4.6 cm x 1.9 cm in transaxial dimension. This was significantly smaller on 10/22/2024 but was larger on 10/18/2024. Its location appears to be the between the left corpus cavernosum and the corpus spongiosum deep to the investing fascia of the penis. Lungs likely relate to a chronic recurring abscess or sterile fluid collection due to potential chronic urethral injury/fistula. There is a scar tissue tract extending from this collection into the perineal soft tissues indicating this may have represented a previous active fistula tract to the skin surface.         INTRAPELVIC FINDINGS: No acute abnormality.       Just medial to the left ischiocavernosus muscle, there is redemonstration of a 5.8 cm x 1.6 cm (transaxial) x 4.4 cm (craniocaudal). To the extent visualized on the prior study this is similar in size, previously measuring ~ 4.6 cm x 1.9 cm in transaxial dimension. This was significantly smaller on 10/22/2024 but was larger on 10/18/2024. Its location appears to be the between the left corpus cavernosum and the corpus spongiosum deep to the investing fascia of the penis. Findings likely relate to a chronic recurring periurethral abscess or sterile fluid collection due to potential chronic urethral fistula. There is a scar tissue tract extending from this collection into the perineal soft tissues indicating this may have represented a previous active fistula tract to the skin surface. Urological follow-up is recommended.         MACRO: None.   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  Secure message to Urology, Dr. Wetzel.  NPO.  Plan for procedure today.  Input appreciated.  Broad-spectrum IV antibiotics.  IV Zosyn.  IV Vancomycin.  Pharmacy dosing Vancomycin.  Monitor Vancomycin level and renal function. Wound culture obtained from penile drainage during encounter.  Follow-up culture results.  Monitor temperature and white blood cell count.  Infectious disease consultation.  As needed analgesics.  Neurological examination and circulatory examinations stable.  Monitor for now.  Repeat hemoglobin A1c.  Monitor blood glucose AC/HS.  Lantus.  Sliding scale insulin.  Hypoglycemia protocol.  ADA diet - when no longer NPO.  Diet, weight loss, exercise education - when no longer n.p.o.  Diabetes education.  Monitor blood pressure.  Continue home medications.  Activity as tolerated.  Fall precautions.  DVT prophylaxis.  GI prophylaxis.  Supportive care.  Patient reassured.  Case management consultation for discharge planning.  We will take DVT, fall, aspiration and decubitus precautions during his stay in the hospital.  The plan has been discussed with the patient.  He is agreeable.  Orders written per Dr. Robert.  Any additions or modifications at his discretion.    Discussed with nursing and Dr. Robert.      Susan Buitrago, APRN-CNP       [1]   Past Medical History:  Diagnosis Date    Acute myocardial infarction, unspecified 11/11/2020    Acute MI    Complex tear of medial meniscus, current injury, right knee, initial encounter 06/17/2020    Complex tear of medial meniscus of right knee as current injury, initial encounter    Cutaneous abscess of perineum 01/04/2021     Perineal abscess    Diabetes mellitus (Multi)     Effusion, right knee 09/15/2020    Knee effusion, right    Encounter for other specified aftercare 05/04/2021    Visit for wound check    Low back pain, unspecified 06/20/2018    Lumbar pain    Low back pain, unspecified 01/16/2019    Lumbar pain    Other conditions influencing health status 12/17/2020    History of cough    Other specified health status     No pertinent past medical history    Pain in left knee     Left knee pain, unspecified chronicity    Personal history of other diseases of male genital organs 07/09/2020    History of balanitis    Radiculopathy, lumbar region 05/03/2019    Acute lumbar radiculopathy    Urinary tract infection, site not specified 05/04/2021    Acute urinary tract infection   [2]   Past Surgical History:  Procedure Laterality Date    OTHER SURGICAL HISTORY  07/09/2020    Knee surgery    OTHER SURGICAL HISTORY  07/09/2020    Abscess incision and drainage   [3] No family history on file.  [4] amLODIPine, 10 mg, oral, Daily  heparin (porcine), 7,500 Units, subcutaneous, q8h SHELLY  [START ON 5/23/2025] insulin glargine, 80 Units, subcutaneous, Daily  insulin lispro, 0-15 Units, subcutaneous, TID AC  losartan, 100 mg, oral, Daily  metoprolol succinate XL, 25 mg, oral, Daily  piperacillin-tazobactam, 4.5 g, intravenous, q6h  rosuvastatin, 40 mg, oral, Daily  vancomycin, 1,500 mg, intravenous, q12h  [5]    [6] PRN medications: acetaminophen **OR** acetaminophen **OR** acetaminophen, benzocaine-menthol, dextromethorphan-guaifenesin, dextrose, dextrose, glucagon, glucagon, guaiFENesin, melatonin, morphine, ondansetron **OR** ondansetron, polyethylene glycol, vancomycin

## 2025-05-22 NOTE — ANESTHESIA POSTPROCEDURE EVALUATION
Patient: Elliot Earl    Procedure Summary       Date: 05/22/25 Room / Location: [facility] OR 07 / [facility]    Anesthesia Start: 1555 Anesthesia Stop: 1802    Procedures:       CYSTOSCOPY, FLEXIBLE      INCISION AND DRAINAGE, PERINEUM Diagnosis:       Perineal abscess      Urethral abscess      (Perineal abscess [L02.215])      (Urethral abscess [N34.0])    Surgeons: Mj Ritchie MD Responsible Provider: Asif Basilio MD    Anesthesia Type: general ASA Status: 3 - Emergent            Anesthesia Type: general    Vitals Value Taken Time   /86 05/22/25 18:52   Temp 36.4 °C (97.5 °F) 05/22/25 17:55   Pulse 91 05/22/25 18:55   Resp 28 05/22/25 18:55   SpO2 100 % 05/22/25 18:55   Vitals shown include unfiled device data.    Anesthesia Post Evaluation    Patient location during evaluation: PACU  Patient participation: complete - patient participated  Level of consciousness: awake  Pain score: 4  Pain management: adequate  Multimodal analgesia pain management approach  Airway patency: patent  Two or more strategies used to mitigate risk of obstructive sleep apnea  Cardiovascular status: acceptable  Respiratory status: acceptable and BIPAP  Hydration status: acceptable  Postoperative Nausea and Vomiting: none        There were no known notable events for this encounter.

## 2025-05-22 NOTE — PROGRESS NOTES
Vancomycin Dosing by Pharmacy- FOLLOW UP    Elliot Earl is a 54 y.o. year old male who Pharmacy has been consulted for vancomycin dosing for cellulitis, skin and soft tissue. Based on the patient's indication and renal status this patient is being dosed based on a goal AUC of 400-600.     Renal function is currently stable.    Current vancomycin dose: 1500 mg given every 12 hours    Estimated vancomycin AUC on current dose: 460 mg/L.hr     Visit Vitals  BP (!) 152/96 (BP Location: Right arm, Patient Position: Sitting)   Pulse 87   Temp 37.2 °C (99 °F) (Oral)   Resp 17        Lab Results   Component Value Date    CREATININE 0.92 2025    CREATININE 1.01 2025    CREATININE 0.79 2025    CREATININE 0.86 02/10/2025        Patient weight is as follows:   Vitals:    25 1744   Weight: 125 kg (275 lb)       Cultures:  No results found for the encounter in last 14 days.       No intake/output data recorded.  I/O during current shift:  I/O this shift:  In: 100 [IV Piggyback:100]  Out: -     Temp (24hrs), Av.9 °C (98.4 °F), Min:36.4 °C (97.5 °F), Max:37.2 °C (99 °F)      Assessment/Plan    Within goal AUC range. Continue current vancomycin regimen.    This dosing regimen is predicted by [employer] to result in the following pharmacokinetic parameters:  Loading dose: 2000 mg  @0139  Regimen: 1500 mg IV every 12 hours.  Start time: 13:00 on 2025  Exposure target: AUC24 (range) 400-600 mg/L.hr   ADQ46-01: 440 mg/L.hr  AUC24,ss: 460 mg/L.hr  Probability of AUC24 > 400: 64 %  Ctrough,ss: 13.9 mg/L  Probability of Ctrough,ss > 20: 25 %    The next level will be obtained on  at AM labs. May be obtained sooner if clinically indicated.   Will continue to monitor renal function daily while on vancomycin and order serum creatinine at least every 48 hours if not already ordered.  Follow for continued vancomycin needs, clinical response, and signs/symptoms of toxicity.       Rachael BOONE  Kiran, PharmD

## 2025-05-22 NOTE — PROGRESS NOTES
05/22/25 1016   Discharge Planning   Expected Discharge Disposition Home     Per chart review pt independent from home no therapy ordered and last [facility] score 24. Patient has no needs from case management. Please consult if needs arise

## 2025-05-22 NOTE — OP NOTE
CYSTOSCOPY, FLEXIBLE, INCISION AND DRAINAGE, PERINEUM Operative Note     Date: 2025 - 2025  OR Location: [facility]    Name: Elliot Earl, : 1970, Age: 54 y.o., MRN: [id number], Sex: male    Diagnosis  Pre-op Diagnosis      * Perineal abscess [L02.215]     * Urethral abscess [N34.0] Post-op Diagnosis     * Perineal abscess [L02.215]     * Urethral abscess [N34.0]     Procedures  CYSTOSCOPY, FLEXIBLE  88394 - LA CYSTOURETHROSCOPY    INCISION AND DRAINAGE, PERINEUM  32823 - LA I&D VULVA/PERINEAL ABSCESS      Surgeons      * Mj Ritchie - Primary    Resident/Fellow/Other Assistant:  Surgeons and Role:  * No surgeons found with a matching role *    Staff:   Rosarioulator: Shahrzad Rojas Person: Tati    Anesthesia Staff: Anesthesiologist: MD LINDSAY Sosa-AA: MICHELLE Mcgregor    Procedure Summary  Anesthesia: General  ASA: III  Estimated Blood Loss: 25 mL  Intra-op Medications:   Administrations occurring from 1515 to 1625 on 25:   Medication Name Total Dose   fentaNYL (Sublimaze) injection 50 mcg/mL 50 mcg   insulin lispro injection 0-15 Units Cannot be calculated   LR infusion Cannot be calculated   lidocaine PF (Xylocaine-MPF) local injection 2 % 50 mg   midazolam (Versed) injection 1 mg/mL 2 mg   phenylephrine 100 mcg/mL syringe 10 mL (prefilled) 150 mcg   propofol (Diprivan) injection 10 mg/mL 200 mg   rocuronium (ZeMuron) 50 mg/5 mL injection 40 mg   succinylcholine (Anectine) 20 mg/mL injection 120 mg   vancomycin (Xellia) 1.5 g in diluent combination  mL Cannot be calculated   famotidine PF (Pepcid) injection 20 mg 20 mg   metoclopramide (Reglan) injection 10 mg 10 mg              Anesthesia Record               Intraprocedure I/O Totals       None           Specimen: No specimens collected              Drains and/or Catheters:   Urethral Catheter Latex 20 Fr. (Active)       Tourniquet Times:         Implants:     Findings: He had a tract going from the  membranous urethra to the perineum.    Indications: Elliot Earl is an 54 y.o. male who is having surgery for Perineal abscess [L02.215]  Urethral abscess [N34.0].  Urethral fistula    The patient was seen in the preoperative area. The risks, benefits, complications, treatment options, non-operative alternatives, expected recovery and outcomes were discussed with the patient. The possibilities of reaction to medication, pulmonary aspiration, injury to surrounding structures, bleeding, recurrent infection, the need for additional procedures, failure to diagnose a condition, and creating a complication requiring transfusion or operation were discussed with the patient. The patient concurred with the proposed plan, giving informed consent.  The site of surgery was properly noted/marked if necessary per policy. The patient has been actively warmed in preoperative area. Preoperative antibiotics have been ordered and given within 1 hours of incision. Venous thrombosis prophylaxis have been ordered including bilateral sequential compression devices    Procedure Details: She was taken the operating room and placed in supine position on the operating table.  Shaved, prepped and draped in a sterile manner.  Patient underwent a flexible cystoscopy.  The bulbar urethra approaching the membranous urethra there was an opening on the left side.  I first followed the urethra into the bladder.  I then came back in follow-up this opening and it here to be a tract into the area that was seen on the CT scan.  I then made a transverse scrotal incision after placing a Madrid catheter and explored the scrotum and perineum.  There is no pocket of pus or any evidence of an abscess.  I placed the cystoscope back in and placed it along this tract with the idea of incising the tract to open it up in the hopes that it would heal.  I attempted this but inadvertently got into the urethra so the urethra was closed with a running 3-0  Vicryl suture.  I attempted again but was unsuccessful and made another small opening in the urethra which will also close.  At this point since there is no purulence I placed a Madrid catheter per urethra into the bladder.  The hope is that by diverting the urine and keeping the Madrid in that this tract will close on its own.  Madrid was connected to dependent drainage.  The Penrose drain placed and was sutured into place using 3-0 nylon.  The skin dartos was closed using a running 3-0 Vicryl suture.  The skin was closing using interrupted 4-0 Vicryl suture.  A dry sterile dressing was placed the patient was awakened and taken to the recovery room in stable condition.  Evidence of Infection: No   Complications: Opening made in the urethra which was closed.  Disposition: PACU - hemodynamically stable.  Condition: stable                 Additional Details:     Attending Attestation: I performed the procedure.    Mj Ritchie  Phone Number: [phone]

## 2025-05-22 NOTE — CONSULTS
Vancomycin Dosing by Pharmacy- INITIAL    Elliot Earl is a 54 y.o. year old male who Pharmacy has been consulted for vancomycin dosing for cellulitis, skin and soft tissue. Based on the patient's indication and renal status this patient will be dosed based on a goal AUC of 400-600.     Renal function is currently stable.    Visit Vitals  /84 (BP Location: Right arm, Patient Position: Lying)   Pulse 90   Temp 37.1 °C (98.8 °F) (Oral)   Resp 16        Lab Results   Component Value Date    CREATININE 1.01 2025    CREATININE 0.79 2025    CREATININE 0.86 02/10/2025    CREATININE 0.78 02/10/2025        Patient weight is as follows:   Vitals:    25 1744   Weight: 125 kg (275 lb)       Cultures:  No results found for the encounter in last 14 days.        No intake/output data recorded.  I/O during current shift:  No intake/output data recorded.    Temp (24hrs), Av.8 °C (98.2 °F), Min:36.4 °C (97.5 °F), Max:37.1 °C (98.8 °F)         Assessment/Plan     Patient has already been given a loading dose of 2000 mg.  Will initiate vancomycin maintenance, 1500 mg every 12 hours.    This dosing regimen is predicted by [employer] to result in the following pharmacokinetic parameters:    Loading dose: N/A  Regimen: 1500 mg IV every 12 hours.  Start time: 13:39 on 2025  Exposure target: AUC24 (range) 400-600 mg/L.hr   [id number]: 541 mg/L.hr  AUC24,ss: 545 mg/L.hr  Probability of AUC24 > 400: 72 %  Ctrough,ss: 13.9 mg/L  Probability of Ctrough,ss > 20: 34 %    Follow-up level will be ordered on 25 at 10:00 unless clinically indicated sooner.  Will continue to monitor renal function daily while on vancomycin and order serum creatinine at least every 48 hours if not already ordered.  Follow for continued vancomycin needs, clinical response, and signs/symptoms of toxicity.       Raysa Freedman, RP

## 2025-05-22 NOTE — SIGNIFICANT EVENT
Patient arrives to PACU 70s on RA, NRB placed on patient and HOB raised. Patient oxygen saturation increased, Dr. Basilio at bedside RT called for Bi-pap for patient.

## 2025-05-22 NOTE — ANESTHESIA PROCEDURE NOTES
Airway  Date/Time: 5/22/2025 4:08 PM  Reason: elective    Difficult airway    Staffing  Performed: MICHELLE   Authorized by: Asif Basilio MD    Performed by: MICHELLE Mcgregor  Patient location during procedure: OR    Patient Condition  Indications for airway management: anesthesia and airway protection  Patient position: sniffing  Planned trial extubation  Sedation level: deep     Final Airway Details   Preoxygenated: yes  Final airway type: endotracheal airway  Successful airway: ETT  Cuffed: yes   Successful intubation technique: video laryngoscopy  Adjuncts used in placement: intubating stylet and cricoid pressure  Endotracheal tube insertion site: oral  Blade: Marlene  Blade size: #3  ETT size (mm): 7.5  Cormack-Lehane Classification: grade I - full view of glottis  Placement verified by: chest auscultation, capnometry and palpation of cuff   Measured from: lips  ETT to lips (cm): 22  Number of attempts at approach: 1    Additional Comments  Large arytenoids, and obscured and small glottic opening visualized with video laryngoscopy.

## 2025-05-22 NOTE — PROGRESS NOTES
Physical Therapy                 Therapy Communication Note    Patient Name: Elliot Earl  MRN: [id number]  Department: [address]  Room: 431/[facility]  Today's Date: 5/22/2025     Discipline: Physical Therapy    Missed Visit Reason:  Rcvd PT Eval orders/ PT Screen completed - pt admitted with perineal abscess; pt lives with spouse and son in a apartment with an elevator; pt reported being active and Independent prior to admission. With no falls.  Pt reported being up ad stacy in room and have no concerns.  Will discharge PT services at this time.

## 2025-05-22 NOTE — ANESTHESIA PREPROCEDURE EVALUATION
Patient: Elliot Earl    Procedure Information       Date/Time: 05/22/25 1515    Procedures:       INCISION AND DRAINAGE, LOWER EXTREMITY      CYSTOSCOPY, FLEXIBLE    Location: [facility] / [facility]    Surgeons: Mj Ritchie MD            Relevant Problems   Cardiac   (+) HTN (hypertension)   (+) Hyperlipidemia      Endocrine   (+) Diabetes mellitus type 1 (Multi)   (+) Obesity       Clinical information reviewed:   Tobacco  Allergies  Meds  Problems  Med Hx  Surg Hx   Fam Hx  Soc   Hx      Vitals:    05/22/25 0821   BP: (!) 152/96   Pulse: 87   Resp: 17   Temp: 37.2 °C (99 °F)   SpO2: 93%       Surgical History[1]  Medical History[2]  Current Medications[3]  Prior to Admission medications    Medication Sig Start Date End Date Taking? Authorizing Provider   amLODIPine (Norvasc) 10 mg tablet Take 1 tablet (10 mg) by mouth once daily. 2/13/25 3/15/25  Micheal Vallejo MD   dextran 70-hypromellose (Bion Tears) 0.1-0.3 % ophthalmic solution Administer 1 drop into both eyes 3 times a day as needed for dry eyes.  Patient not taking: Reported on 2/11/2025 11/20/24 11/20/25  Asif Dash PA-C   ibuprofen 600 mg tablet Take 1 tablet (600 mg) by mouth every 8 hours if needed for mild pain (1 - 3) or fever (temp greater than 38.0 C).  Patient not taking: Reported on 2/11/2025 8/15/24   Emma Tan MD   insulin glargine (Lantus Solostar U-100 Insulin) 100 unit/mL (3 mL) pen Inject 80 Units under the skin once daily at bedtime. 2/13/25 3/22/25  Micheal Vallejo MD   losartan (Cozaar) 100 mg tablet Take 1 tablet (100 mg) by mouth once daily. 2/13/25 3/15/25  Micheal Vallejo MD   metoprolol succinate XL (Toprol-XL) 25 mg 24 hr tablet Take 1 tablet (25 mg) by mouth once daily. 2/13/25 3/15/25  Micheal Vallejo MD   rosuvastatin (Crestor) 40 mg tablet Take 1 tablet (40 mg) by mouth once daily. 2/13/25 3/15/25  Micheal Vallejo MD     RX Allergies[4]  Social History     Tobacco Use     Smoking status: Every Day     Current packs/day: 1.00     Average packs/day: 1 pack/day for 20.0 years (20.0 ttl pk-yrs)     Types: Cigarettes    Smokeless tobacco: Not on file   Substance Use Topics    Alcohol use: Yes         Chemistry    Lab Results   Component Value Date/Time     05/22/2025 0549    K 3.9 05/22/2025 0549     05/22/2025 0549    CO2 25 05/22/2025 0549    BUN 10 05/22/2025 0549    CREATININE 0.92 05/22/2025 0549    Lab Results   Component Value Date/Time    CALCIUM 8.8 05/22/2025 0549    ALKPHOS 68 05/22/2025 0549    AST 9 05/22/2025 0549    ALT 15 05/22/2025 0549    BILITOT 0.4 05/22/2025 0549          Lab Results   Component Value Date/Time    WBC 7.7 05/22/2025 0549    HGB 15.4 05/22/2025 0549    HCT 47.0 05/22/2025 0549     05/22/2025 0549     Lab Results   Component Value Date/Time    PROTIME 12.0 02/11/2025 0841    INR 1.1 02/11/2025 0841     No results found for this or any previous visit (from the past 4464 hours).      NPO Detail:  No data recorded     Physical Exam    Airway  Mallampati: II  TM distance: >3 FB  Neck ROM: full  Mouth opening: 3 or more finger widths     Cardiovascular    Dental - normal exam     Pulmonary    Abdominal            Anesthesia Plan    History of general anesthesia?: no  History of complications of general anesthesia?: no    ASA 3 - emergent     general     The patient is a current smoker.  Patient was previously instructed to abstain from smoking on day of procedure.  Patient did not smoke on day of procedure.  Education provided regarding risk of obstructive sleep apnea.  intravenous induction   Anesthetic plan and risks discussed with patient.    Plan discussed with CAA.           [1]   Past Surgical History:  Procedure Laterality Date    OTHER SURGICAL HISTORY  07/09/2020    Knee surgery    OTHER SURGICAL HISTORY  07/09/2020    Abscess incision and drainage   [2]   Past Medical History:  Diagnosis Date    Acute myocardial infarction,  unspecified 11/11/2020    Acute MI    Complex tear of medial meniscus, current injury, right knee, initial encounter 06/17/2020    Complex tear of medial meniscus of right knee as current injury, initial encounter    Cutaneous abscess of perineum 01/04/2021    Perineal abscess    Diabetes mellitus (Multi)     Effusion, right knee 09/15/2020    Knee effusion, right    Encounter for other specified aftercare 05/04/2021    Visit for wound check    Low back pain, unspecified 06/20/2018    Lumbar pain    Low back pain, unspecified 01/16/2019    Lumbar pain    Other conditions influencing health status 12/17/2020    History of cough    Other specified health status     No pertinent past medical history    Pain in left knee     Left knee pain, unspecified chronicity    Personal history of other diseases of male genital organs 07/09/2020    History of balanitis    Radiculopathy, lumbar region 05/03/2019    Acute lumbar radiculopathy    Urinary tract infection, site not specified 05/04/2021    Acute urinary tract infection   [3]   Current Facility-Administered Medications:     acetaminophen (Tylenol) tablet 650 mg, 650 mg, oral, q4h PRN **OR** acetaminophen (Tylenol) oral liquid 650 mg, 650 mg, nasogastric tube, q4h PRN **OR** acetaminophen (Tylenol) suppository 650 mg, 650 mg, rectal, q4h PRN, Jeramie Robert MD    amLODIPine (Norvasc) tablet 10 mg, 10 mg, oral, Daily, Jeramie Robert MD, 10 mg at 05/22/25 0924    benzocaine-menthol (Cepastat Sore Throat) lozenge 1 lozenge, 1 lozenge, Mouth/Throat, q2h PRN, Jeramie Robert MD    dextromethorphan-guaifenesin (Robitussin DM)  mg/5 mL oral liquid 5 mL, 5 mL, oral, q4h PRN, Jeramie Robert MD    dextrose 50 % injection 12.5 g, 12.5 g, intravenous, q15 min PRN, Jeramie Robert MD    dextrose 50 % injection 25 g, 25 g, intravenous, q15 min PRN, Jeramie Rboert MD    glucagon (Glucagen) injection 1 mg, 1 mg, intramuscular, q15 min PRN, Jeramie WANG  MD Jakob    glucagon (Glucagen) injection 1 mg, 1 mg, intramuscular, q15 min PRN, Jeramie Robert MD    guaiFENesin (Mucinex) 12 hr tablet 600 mg, 600 mg, oral, q12h PRN, Jeramie Robert MD    heparin (porcine) injection 7,500 Units, 7,500 Units, subcutaneous, q8h SHELLY, Jeramie Robert MD, 7,500 Units at 05/22/25 0139    [START ON 5/23/2025] insulin glargine (Lantus) injection 80 Units, 80 Units, subcutaneous, Daily, Jeramie Robert MD    insulin lispro injection 0-15 Units, 0-15 Units, subcutaneous, TID AC, Jeramie Robert MD, 3 Units at 05/22/25 1239    losartan (Cozaar) tablet 100 mg, 100 mg, oral, Daily, Jeramie Robert MD, 100 mg at 05/22/25 0924    melatonin tablet 6 mg, 6 mg, oral, Nightly PRN, Jeramie Robert MD    metoprolol succinate XL (Toprol-XL) 24 hr tablet 25 mg, 25 mg, oral, Daily, Jeramie Robert MD, 25 mg at 05/22/25 0924    morphine injection 2 mg, 2 mg, intravenous, q4h PRN, Jeramie Robert MD, 2 mg at 05/22/25 0152    ondansetron (Zofran) tablet 4 mg, 4 mg, oral, q8h PRN **OR** ondansetron (Zofran) injection 4 mg, 4 mg, intravenous, q8h PRN, Jeramie Robert MD    piperacillin-tazobactam (Zosyn) 4.5 g in dextrose (iso)  mL, 4.5 g, intravenous, q6h, Jeramie Robert MD, Stopped at 05/22/25 1341    polyethylene glycol (Glycolax, Miralax) packet 17 g, 17 g, oral, Daily PRN, Jeramie Robert MD    rosuvastatin (Crestor) tablet 40 mg, 40 mg, oral, Daily, Jeramie Robert MD, 40 mg at 05/22/25 0924    vancomycin (Vancocin) pharmacy to dose - pharmacy monitoring, , miscellaneous, Daily PRN, Jeramie Robert MD    vancomycin (Xellia) 1.5 g in diluent combination  mL, 1,500 mg, intravenous, q12h, Jeramie Robert MD, Last Rate: 200 mL/hr at 05/22/25 1337, 1.5 g at 05/22/25 1337  [4] No Known Allergies

## 2025-05-22 NOTE — PROGRESS NOTES
Occupational Therapy    OT Screen    Patient Name: Elliot Earl  MRN: [id number]  Department: [address]  Room: [address] [place], [place] [address]  Today's Date: 5/22/2025     Discipline: Occupational Therapy    Missed Visit Reason: Other (Comment) (OT orderes received and pt chart reviewed. OT screen complete, pt currently at baseline level of function, independent with all ADLs and functional mobility. Pt reports no falls at home, lives with spouse. Pt reports no concerns about going home. No need for skilled OT at this time. )

## 2025-05-22 NOTE — CARE PLAN
The patient's goals for the shift include rest    The clinical goals for the shift include remain hds      Problem: Pain - Adult  Goal: Verbalizes/displays adequate comfort level or baseline comfort level  Outcome: Progressing     Problem: Safety - Adult  Goal: Free from fall injury  Outcome: Progressing     Problem: Discharge Planning  Goal: Discharge to home or other facility with appropriate resources  Outcome: Progressing     Problem: Chronic Conditions and Co-morbidities  Goal: Patient's chronic conditions and co-morbidity symptoms are monitored and maintained or improved  Outcome: Progressing     Problem: Nutrition  Goal: Nutrient intake appropriate for maintaining nutritional needs  Outcome: Progressing

## 2025-05-22 NOTE — NURSING NOTE
Assumed care of this pt. Pt is resting in bed with eyes closed, breathing even and unlabored on 4L NC. NAD noted. Pts wife at bedside. BSSR complete. Bed low, call light and belongings in reach. Continuing to monitor

## 2025-05-23 LAB
ANION GAP SERPL CALCULATED.3IONS-SCNC: 11 MMOL/L (ref 10–20)
BUN SERPL-MCNC: 10 MG/DL (ref 6–23)
CALCIUM SERPL-MCNC: 8.4 MG/DL (ref 8.6–10.3)
CHLORIDE SERPL-SCNC: 102 MMOL/L (ref 98–107)
CO2 SERPL-SCNC: 26 MMOL/L (ref 21–32)
CREAT SERPL-MCNC: 1.01 MG/DL (ref 0.5–1.3)
EGFRCR SERPLBLD CKD-EPI 2021: 89 ML/MIN/1.73M*2
ERYTHROCYTE [DISTWIDTH] IN BLOOD BY AUTOMATED COUNT: 14.6 % (ref 11.5–14.5)
GLUCOSE BLD MANUAL STRIP-MCNC: 148 MG/DL (ref 74–99)
GLUCOSE BLD MANUAL STRIP-MCNC: 235 MG/DL (ref 74–99)
GLUCOSE BLD MANUAL STRIP-MCNC: 251 MG/DL (ref 74–99)
GLUCOSE BLD MANUAL STRIP-MCNC: 331 MG/DL (ref 74–99)
GLUCOSE BLD MANUAL STRIP-MCNC: 403 MG/DL (ref 74–99)
GLUCOSE SERPL-MCNC: 230 MG/DL (ref 74–99)
HCT VFR BLD AUTO: 45.8 % (ref 41–52)
HGB BLD-MCNC: 14.4 G/DL (ref 13.5–17.5)
MCH RBC QN AUTO: 26.2 PG (ref 26–34)
MCHC RBC AUTO-ENTMCNC: 31.4 G/DL (ref 32–36)
MCV RBC AUTO: 83 FL (ref 80–100)
NRBC BLD-RTO: 0 /100 WBCS (ref 0–0)
PLATELET # BLD AUTO: 165 X10*3/UL (ref 150–450)
POTASSIUM SERPL-SCNC: 4.1 MMOL/L (ref 3.5–5.3)
RBC # BLD AUTO: 5.5 X10*6/UL (ref 4.5–5.9)
SODIUM SERPL-SCNC: 135 MMOL/L (ref 136–145)
VANCOMYCIN SERPL-MCNC: 17.7 UG/ML (ref 5–20)
WBC # BLD AUTO: 11.9 X10*3/UL (ref 4.4–11.3)

## 2025-05-23 PROCEDURE — 87040 BLOOD CULTURE FOR BACTERIA: CPT | Mod: WESLAB | Performed by: NURSE PRACTITIONER

## 2025-05-23 PROCEDURE — 82947 ASSAY GLUCOSE BLOOD QUANT: CPT

## 2025-05-23 PROCEDURE — 36415 COLL VENOUS BLD VENIPUNCTURE: CPT | Performed by: NURSE PRACTITIONER

## 2025-05-23 PROCEDURE — 2500000004 HC RX 250 GENERAL PHARMACY W/ HCPCS (ALT 636 FOR OP/ED): Performed by: UROLOGY

## 2025-05-23 PROCEDURE — 80202 ASSAY OF VANCOMYCIN: CPT | Performed by: UROLOGY

## 2025-05-23 PROCEDURE — 2500000002 HC RX 250 W HCPCS SELF ADMINISTERED DRUGS (ALT 637 FOR MEDICARE OP, ALT 636 FOR OP/ED): Performed by: UROLOGY

## 2025-05-23 PROCEDURE — 2500000001 HC RX 250 WO HCPCS SELF ADMINISTERED DRUGS (ALT 637 FOR MEDICARE OP): Performed by: UROLOGY

## 2025-05-23 PROCEDURE — 85027 COMPLETE CBC AUTOMATED: CPT | Performed by: NURSE PRACTITIONER

## 2025-05-23 PROCEDURE — 2500000004 HC RX 250 GENERAL PHARMACY W/ HCPCS (ALT 636 FOR OP/ED): Mod: JZ | Performed by: INTERNAL MEDICINE

## 2025-05-23 PROCEDURE — 1200000002 HC GENERAL ROOM WITH TELEMETRY DAILY

## 2025-05-23 PROCEDURE — 80048 BASIC METABOLIC PNL TOTAL CA: CPT | Performed by: NURSE PRACTITIONER

## 2025-05-23 PROCEDURE — 5A09357 ASSISTANCE WITH RESPIRATORY VENTILATION, LESS THAN 24 CONSECUTIVE HOURS, CONTINUOUS POSITIVE AIRWAY PRESSURE: ICD-10-PCS | Performed by: NURSE PRACTITIONER

## 2025-05-23 RX ORDER — KETOROLAC TROMETHAMINE 15 MG/ML
15 INJECTION, SOLUTION INTRAMUSCULAR; INTRAVENOUS EVERY 6 HOURS PRN
Status: DISCONTINUED | OUTPATIENT
Start: 2025-05-23 | End: 2025-05-25 | Stop reason: HOSPADM

## 2025-05-23 RX ADMIN — HEPARIN SODIUM 7500 UNITS: 5000 INJECTION INTRAVENOUS; SUBCUTANEOUS at 05:49

## 2025-05-23 RX ADMIN — PIPERACILLIN SODIUM AND TAZOBACTAM SODIUM 4.5 G: 4; .5 INJECTION, SOLUTION INTRAVENOUS at 23:17

## 2025-05-23 RX ADMIN — INSULIN LISPRO 6 UNITS: 100 INJECTION, SOLUTION INTRAVENOUS; SUBCUTANEOUS at 09:54

## 2025-05-23 RX ADMIN — KETOROLAC TROMETHAMINE 15 MG: 15 INJECTION, SOLUTION INTRAMUSCULAR; INTRAVENOUS at 18:23

## 2025-05-23 RX ADMIN — HEPARIN SODIUM 7500 UNITS: 5000 INJECTION INTRAVENOUS; SUBCUTANEOUS at 13:39

## 2025-05-23 RX ADMIN — MORPHINE SULFATE 2 MG: 2 INJECTION, SOLUTION INTRAMUSCULAR; INTRAVENOUS at 13:41

## 2025-05-23 RX ADMIN — PIPERACILLIN SODIUM AND TAZOBACTAM SODIUM 4.5 G: 4; .5 INJECTION, SOLUTION INTRAVENOUS at 00:05

## 2025-05-23 RX ADMIN — PIPERACILLIN SODIUM AND TAZOBACTAM SODIUM 4.5 G: 4; .5 INJECTION, SOLUTION INTRAVENOUS at 05:49

## 2025-05-23 RX ADMIN — METOPROLOL SUCCINATE 25 MG: 25 TABLET, EXTENDED RELEASE ORAL at 10:32

## 2025-05-23 RX ADMIN — HEPARIN SODIUM 7500 UNITS: 5000 INJECTION INTRAVENOUS; SUBCUTANEOUS at 21:50

## 2025-05-23 RX ADMIN — ACETAMINOPHEN 650 MG: 325 TABLET ORAL at 00:05

## 2025-05-23 RX ADMIN — INSULIN LISPRO 15 UNITS: 100 INJECTION, SOLUTION INTRAVENOUS; SUBCUTANEOUS at 11:56

## 2025-05-23 RX ADMIN — PIPERACILLIN SODIUM AND TAZOBACTAM SODIUM 4.5 G: 4; .5 INJECTION, SOLUTION INTRAVENOUS at 18:39

## 2025-05-23 RX ADMIN — ROSUVASTATIN 40 MG: 20 TABLET, FILM COATED ORAL at 10:32

## 2025-05-23 RX ADMIN — AMLODIPINE BESYLATE 10 MG: 10 TABLET ORAL at 10:33

## 2025-05-23 RX ADMIN — PIPERACILLIN SODIUM AND TAZOBACTAM SODIUM 4.5 G: 4; .5 INJECTION, SOLUTION INTRAVENOUS at 13:40

## 2025-05-23 RX ADMIN — KETOROLAC TROMETHAMINE 15 MG: 15 INJECTION, SOLUTION INTRAMUSCULAR; INTRAVENOUS at 00:52

## 2025-05-23 RX ADMIN — LOSARTAN POTASSIUM 100 MG: 100 TABLET, FILM COATED ORAL at 10:32

## 2025-05-23 RX ADMIN — VANCOMYCIN 1.5 G: 1.5 INJECTION, SOLUTION INTRAVENOUS at 00:52

## 2025-05-23 RX ADMIN — KETOROLAC TROMETHAMINE 15 MG: 15 INJECTION, SOLUTION INTRAMUSCULAR; INTRAVENOUS at 10:33

## 2025-05-23 RX ADMIN — INSULIN GLARGINE 80 UNITS: 100 INJECTION, SOLUTION SUBCUTANEOUS at 10:53

## 2025-05-23 ASSESSMENT — COGNITIVE AND FUNCTIONAL STATUS - GENERAL
PATIENT BASELINE BEDBOUND: NO
DAILY ACTIVITIY SCORE: 24
MOBILITY SCORE: 24
MOBILITY SCORE: 24
DAILY ACTIVITIY SCORE: 24

## 2025-05-23 ASSESSMENT — ENCOUNTER SYMPTOMS
EYES NEGATIVE: 1
PSYCHIATRIC NEGATIVE: 1
ENDOCRINE NEGATIVE: 1
HEMATOLOGIC/LYMPHATIC NEGATIVE: 1
CONSTITUTIONAL NEGATIVE: 1
WOUND: 1
ALLERGIC/IMMUNOLOGIC NEGATIVE: 1
GASTROINTESTINAL NEGATIVE: 1
CARDIOVASCULAR NEGATIVE: 1
NUMBNESS: 1
MUSCULOSKELETAL NEGATIVE: 1
RESPIRATORY NEGATIVE: 1

## 2025-05-23 ASSESSMENT — PAIN SCALES - GENERAL
PAINLEVEL_OUTOF10: 7
PAINLEVEL_OUTOF10: 0 - NO PAIN
PAINLEVEL_OUTOF10: 6
PAINLEVEL_OUTOF10: 3
PAINLEVEL_OUTOF10: 0 - NO PAIN
PAINLEVEL_OUTOF10: 0 - NO PAIN
PAINLEVEL_OUTOF10: 6

## 2025-05-23 ASSESSMENT — PAIN DESCRIPTION - ORIENTATION
ORIENTATION: MID

## 2025-05-23 ASSESSMENT — ACTIVITIES OF DAILY LIVING (ADL)
GROOMING: INDEPENDENT
TOILETING: INDEPENDENT
ADEQUATE_TO_COMPLETE_ADL: YES
HEARING - LEFT EAR: FUNCTIONAL
HEARING - RIGHT EAR: FUNCTIONAL
PATIENT'S MEMORY ADEQUATE TO SAFELY COMPLETE DAILY ACTIVITIES?: YES
DRESSING YOURSELF: INDEPENDENT
FEEDING YOURSELF: INDEPENDENT
WALKS IN HOME: INDEPENDENT
JUDGMENT_ADEQUATE_SAFELY_COMPLETE_DAILY_ACTIVITIES: YES
BATHING: INDEPENDENT

## 2025-05-23 ASSESSMENT — PAIN DESCRIPTION - LOCATION
LOCATION: GROIN

## 2025-05-23 ASSESSMENT — PAIN - FUNCTIONAL ASSESSMENT
PAIN_FUNCTIONAL_ASSESSMENT: FLACC (FACE, LEGS, ACTIVITY, CRY, CONSOLABILITY)
PAIN_FUNCTIONAL_ASSESSMENT: 0-10

## 2025-05-23 ASSESSMENT — PAIN DESCRIPTION - DESCRIPTORS
DESCRIPTORS: ACHING
DESCRIPTORS: DISCOMFORT
DESCRIPTORS: SHARP
DESCRIPTORS: ACHING

## 2025-05-23 NOTE — NURSING NOTE
Blood cultures drawn at this time,   One set from right arm and one set from left arm and sent at this time

## 2025-05-23 NOTE — CARE PLAN
The patient's goals for the shift include rest    The clinical goals for the shift include manage pain, maintain merchant per urology, monitor BS    Over the shift, the patient did not make progress toward the following goals. Barriers to progression include na. Recommendations to address these barriers include na.      Problem: Pain - Adult  Goal: Verbalizes/displays adequate comfort level or baseline comfort level  Outcome: Progressing     Problem: Safety - Adult  Goal: Free from fall injury  Outcome: Progressing     Problem: Discharge Planning  Goal: Discharge to home or other facility with appropriate resources  Outcome: Progressing     Problem: Chronic Conditions and Co-morbidities  Goal: Patient's chronic conditions and co-morbidity symptoms are monitored and maintained or improved  Outcome: Progressing     Problem: Nutrition  Goal: Nutrient intake appropriate for maintaining nutritional needs  Outcome: Progressing     Problem: Diabetes  Goal: Achieve decreasing blood glucose levels by end of shift  Outcome: Progressing  Goal: Increase stability of blood glucose readings by end of shift  Outcome: Progressing  Goal: Decrease in ketones present in urine by end of shift  Outcome: Progressing  Goal: Maintain electrolyte levels within acceptable range throughout shift  Outcome: Progressing  Goal: Maintain glucose levels >70mg/dl to <250mg/dl throughout shift  Outcome: Progressing  Goal: No changes in neurological exam by end of shift  Outcome: Progressing  Goal: Learn about and adhere to nutrition recommendations by end of shift  Outcome: Progressing  Goal: Vital signs within normal range for age by end of shift  Outcome: Progressing  Goal: Increase self care and/or family involovement by end of shift  Outcome: Progressing  Goal: Receive DSME education by end of shift  Outcome: Progressing     Problem: Pain  Goal: Takes deep breaths with improved pain control throughout the shift  Outcome: Progressing  Goal: Turns in  bed with improved pain control throughout the shift  Outcome: Progressing  Goal: Walks with improved pain control throughout the shift  Outcome: Progressing  Goal: Performs ADL's with improved pain control throughout shift  Outcome: Progressing  Goal: Participates in PT with improved pain control throughout the shift  Outcome: Progressing  Goal: Free from opioid side effects throughout the shift  Outcome: Progressing  Goal: Free from acute confusion related to pain meds throughout the shift  Outcome: Progressing

## 2025-05-23 NOTE — CONSULTS
INFECTIOUS DISEASES CONSULTATION NOTE      Referred by MIMI Robert MD    Reason For Consult  Urethral infection/fistula    History Of Present Illness  Elliot Earl is a 54 y.o. male with type 2 diabetes mellitus who has had a recurring problem with urethral stricture and infection.  He believes that this problem began 4 to 5 years ago, with cloudy urine, dysuria, disrupted urinary stream, and perineal pain.  He has had multiple surgical procedures and was most recently hospitalized at [facility] in February 2025.  Urine cultures were negative.  He was seen in consultation by the Infectious Diseases service and was discharged on a 1 month course of Augmentin.  He had a follow-up ID visit in March 2025 and was doing well and antibiotics were discontinued    Several days before this admission he developed typical recurrent symptoms of urinary difficulty, urinary cloudiness, dysuria, and perineal pain.  He had no fever, chills, or other systemic symptoms.  He was evaluated in the emergency room in the evening of 5/21.  Cultures were collected and he was started on vancomycin and Zosyn and he was taken to surgery on 5/22 by Dr. Ritchie, who found a fistulous tract and no evidence for perineal or scrotal suppuration.  Plan is to keep the Madrid catheter in place for 10 days to allow healing of the urethra.     Past Medical History  Diabetes type II  Coronary artery disease, myocardial infarction  Lumbar radiculopathy  Complex tear of the right medial meniscus  One remote episode of nephrolithiasis    Social History  Tobacco use: 1 pack/day  Alcohol use: Regular social alcohol  Occupation: Works as a     Family History  Not pertinent to the current question    Allergies  Patient has no known allergies.     Review of Systems  Detailed review of systems completed.  No significant additional positives beyond what is mentioned above    Physical Exam  Vital signs:  Visit Vitals  /80 (BP Location: Left  "arm, Patient Position: Lying)   Pulse 89   Temp 37.1 °C (98.8 °F) (Oral)   Resp 18   Isolated temperature of 38.3 last evening  General: Ambulating in the room without difficulty  HEENT:  No scleral icterus or conjunctival suffusion, oral mucosa moist  Nodes:  Negative  Lungs:  Clear to auscultation  Heart:  S1, S2 normal, no pathologic murmur appreciated  Abdomen:  Soft, obese, nontender. No palpable organs or masses  Back:  No spinal or CVA tenderness  Genitalia: Madrid catheter in place.  Cloudy urine.  Scrotal incision with packing  Extremities:  No cords, phlebitis, cellulitis  Neurologic:  Alert.  Grossly non-focal.      Relevant Results  Results from last 72 hours   Lab Units 05/23/25  0540 05/22/25  0549 05/21/25  1944   WBC AUTO x10*3/uL 11.9*   < > 12.3*   HEMOGLOBIN g/dL 14.4   < > 15.8   HEMATOCRIT % 45.8   < > 48.9   PLATELETS AUTO x10*3/uL 165   < > 181   LYMPHO PCT MAN %  --   --  24.0   MONO PCT MAN %  --   --  7.0   EOSINO PCT MAN %  --   --  1.0    < > = values in this interval not displayed.     Results from last 72 hours   Lab Units 05/23/25  0540   CREATININE mg/dL 1.01   ANION GAP mmol/L 11   EGFR mL/min/1.73m*2 89     Results from last 72 hours   Lab Units 05/22/25  0549   AST U/L 9   ALT U/L 15   ALK PHOS U/L 68   BILIRUBIN TOTAL mg/dL 0.4     Urinalysis: Benign  Microbiology:  Blood (5/23): Pending X2  Operative specimen (5/23): Pending  Imaging:  CT pelvis:   \"Just medial to the left ischiocavernosus muscle, there is  redemonstration of a 5.8 cm x 1.6 cm (transaxial) x 4.4 cm  (craniocaudal). To the extent visualized on the prior study this is  similar in size, previously measuring ~ 4.6 cm x 1.9 cm in  transaxial dimension. This was significantly smaller on 10/22/2024  but was larger on 10/18/2024. Its location appears to be the between  the left corpus cavernosum and the corpus spongiosum deep to the  investing fascia of the penis. Findings likely relate to a chronic  recurring " "periurethral abscess or sterile fluid collection due to  potential chronic urethral fistula. There is a scar tissue tract  extending from this collection into the perineal soft tissues  indicating this may have represented a previous active fistula tract  to the skin surface. Urological follow-up is recommended.\"      ASSESSMENT:  Urethritis/urinary tract infection  Recurrent urethral stricture and development of fistula.  Now day 1 S/P I&D and exploration.  Dr. Ritchie's operative note reviewed in detail and appreciated.  Cultures negative thus far.    Type 2 diabetes      PLANS:  -   Continue Zosyn pending final culture results  -   Stop vancomycin     THANK YOU FOR ASKING ME TO ASSIST YOU IN THE CARE OF YOUR PATIENT    I will review the medical record and culture results 5/24-5/25 and make further recommendations.  Anticipate discharge on oral antibiotic therapy    Jeffrey Gonzalez MD  [facility]  Office:  [phone]      "

## 2025-05-23 NOTE — PROGRESS NOTES
Elliot Earl is a 54 y.o. male on day 2 of admission presenting with Perineal abscess.    Subjective   Patient seen and examined.  Resting in bed in no acute distress.  Awake alert oriented x 3.  States he feels better.  Perineal area pain is controlled.  No fevers chills or sweats.      Objective     Physical Exam  Vitals and nursing note reviewed.   Constitutional:       General: He is not in acute distress.     Appearance: He is obese. He is not ill-appearing, toxic-appearing or diaphoretic.   HENT:      Head: Normocephalic and atraumatic.      Right Ear: External ear normal.      Left Ear: External ear normal.      Nose: Nose normal.      Mouth/Throat:      Mouth: Mucous membranes are moist.      Pharynx: Oropharynx is clear.   Eyes:      Extraocular Movements: Extraocular movements intact.      Conjunctiva/sclera: Conjunctivae normal.      Pupils: Pupils are equal, round, and reactive to light.   Cardiovascular:      Rate and Rhythm: Normal rate and regular rhythm.      Pulses: Normal pulses.      Heart sounds: Normal heart sounds. No murmur heard.  Pulmonary:      Effort: Pulmonary effort is normal. No respiratory distress.      Breath sounds: Normal breath sounds. No wheezing, rhonchi or rales.      Comments: 4 liters nasal cannula.  Abdominal:      General: Bowel sounds are normal.      Palpations: Abdomen is soft.   Genitourinary:     Comments: : [place] catheter in place draining clear yellow urine. Perineal dressing clean dry intact. Penrose drain in place - bloody drainage noted. Rectal examination deferred  Musculoskeletal:         General: No swelling or tenderness. Normal range of motion.      Cervical back: Normal range of motion and neck supple.      Right lower leg: No edema.      Left lower leg: No edema.   Skin:     General: Skin is warm and dry.      Capillary Refill: Capillary refill takes less than 2 seconds.   Neurological:      General: No focal deficit present.      Mental  "Status: He is alert and oriented to person, place, and time.   Psychiatric:         Mood and Affect: Mood normal.         Behavior: Behavior normal.       Last Recorded Vitals  Blood pressure 141/80, pulse 89, temperature 37.1 °C (98.8 °F), temperature source Oral, resp. rate 18, height 1.702 m (5' 7\"), weight 129 kg (283 lb 8.2 oz), SpO2 97%.    Intake/Output last 3 Shifts:  I/O last 3 completed shifts:  In: 1900 (14.8 mL/kg) [I.V.:1000 (7.8 mL/kg); IV Piggyback:900]  Out: 950 (7.4 mL/kg) [Urine:925 (0.2 mL/kg/hr); Blood:25]  Weight: 128.6 kg     Telemetry normal sinus rhythm rate 80's    Relevant Results  Results for orders placed or performed during the hospital encounter of 05/21/25 (from the past 24 hours)   POCT GLUCOSE   Result Value Ref Range    POCT Glucose 176 (H) 74 - 99 mg/dL   Tissue/Wound Culture/Smear    Specimen: Wound/Tissue; Tissue/Biopsy   Result Value Ref Range    Tissue/Wound Culture/Smear No growth to date    POCT GLUCOSE   Result Value Ref Range    POCT Glucose 120 (H) 74 - 99 mg/dL   POCT GLUCOSE   Result Value Ref Range    POCT Glucose 125 (H) 74 - 99 mg/dL   POCT GLUCOSE   Result Value Ref Range    POCT Glucose 167 (H) 74 - 99 mg/dL   Blood Culture    Specimen: Peripheral Venipuncture; Blood culture   Result Value Ref Range    Blood Culture Loaded on Instrument - Culture in progress    Blood Culture    Specimen: Peripheral Venipuncture; Blood culture   Result Value Ref Range    Blood Culture Loaded on Instrument - Culture in progress    Vancomycin   Result Value Ref Range    Vancomycin 17.7 5.0 - 20.0 ug/mL   CBC   Result Value Ref Range    WBC 11.9 (H) 4.4 - 11.3 x10*3/uL    nRBC 0.0 0.0 - 0.0 /100 WBCs    RBC 5.50 4.50 - 5.90 x10*6/uL    Hemoglobin 14.4 13.5 - 17.5 g/dL    Hematocrit 45.8 41.0 - 52.0 %    MCV 83 80 - 100 fL    MCH 26.2 26.0 - 34.0 pg    MCHC 31.4 (L) 32.0 - 36.0 g/dL    RDW 14.6 (H) 11.5 - 14.5 %    Platelets 165 150 - 450 x10*3/uL   Basic Metabolic Panel   Result Value " Ref Range    Glucose 230 (H) 74 - 99 mg/dL    Sodium 135 (L) 136 - 145 mmol/L    Potassium 4.1 3.5 - 5.3 mmol/L    Chloride 102 98 - 107 mmol/L    Bicarbonate 26 21 - 32 mmol/L    Anion Gap 11 10 - 20 mmol/L    Urea Nitrogen 10 6 - 23 mg/dL    Creatinine 1.01 0.50 - 1.30 mg/dL    eGFR 89 >60 mL/min/1.73m*2    Calcium 8.4 (L) 8.6 - 10.3 mg/dL   POCT GLUCOSE   Result Value Ref Range    POCT Glucose 235 (H) 74 - 99 mg/dL   POCT GLUCOSE   Result Value Ref Range    POCT Glucose 403 (H) 74 - 99 mg/dL     No results found for the last 90 days.    CT pelvis w IV contrast  Result Date: 5/21/2025  Interpreted By:  Perry Cassidy, STUDY: CT PELVIS W IV CONTRAST;  5/21/2025 8:32 pm   INDICATION: Signs/Symptoms:abscess perineum.     COMPARISON: 02/10/2025 and multiple prior CT abdomen/pelvis exams.   ACCESSION NUMBER(S): [id number]   ORDERING CLINICIAN: LEV GAITAN   TECHNIQUE: Axial CT images of the pelvis were obtained after the intravenous administration of iodinated contrast. Coronal and sagittal reformatted images were reconstructed from the axial data. 3D images were not performed at time of dictation therefore not used during interpretation.   FINDINGS: OSSEOUS STRUCTURES: No acute fracture or malalignment. Remote right hamstring tendon strain noted.     SOFT TISSUES: Just medial to the left ischiocavernosus muscle, there is redemonstration of a 5.8 cm x 1.6 cm (transaxial) x 4.4 cm (craniocaudal). To the extent visualized on the prior study this is similar in size, previously measuring ~ 4.6 cm x 1.9 cm in transaxial dimension. This was significantly smaller on 10/22/2024 but was larger on 10/18/2024. Its location appears to be the between the left corpus cavernosum and the corpus spongiosum deep to the investing fascia of the penis. Lungs likely relate to a chronic recurring abscess or sterile fluid collection due to potential chronic urethral injury/fistula. There is a scar tissue tract extending from this  collection into the perineal soft tissues indicating this may have represented a previous active fistula tract to the skin surface.         INTRAPELVIC FINDINGS: No acute abnormality.       Just medial to the left ischiocavernosus muscle, there is redemonstration of a 5.8 cm x 1.6 cm (transaxial) x 4.4 cm (craniocaudal). To the extent visualized on the prior study this is similar in size, previously measuring ~ 4.6 cm x 1.9 cm in transaxial dimension. This was significantly smaller on 10/22/2024 but was larger on 10/18/2024. Its location appears to be the between the left corpus cavernosum and the corpus spongiosum deep to the investing fascia of the penis. Findings likely relate to a chronic recurring periurethral abscess or sterile fluid collection due to potential chronic urethral fistula. There is a scar tissue tract extending from this collection into the perineal soft tissues indicating this may have represented a previous active fistula tract to the skin surface. Urological follow-up is recommended.         MACRO: None.   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  Post-operative care per Urology.  Wound care and penrose drain management per Urology.  Monitor.  Pain control.  As needed analgesics.  Adjust analgesics as needed for pain control.  Fever overnight - resolved.  Afebrile this am.  Non-toxic  appearing.  Leukocytosis - improved.  Penile drainage culture is pending.  Blood cultures pending.  Infectious disease consultation.   Broad-spectrum IV antibiotics.  Antibiotics per Infectious disease.  Follow-up.  Glucose reviewed, hyperglycemic.  Hemoglobin A1c 11.0 - improved compared to prior.  Diet to ADA.  Monitor point-of-care close AC/HS.  Lantus, sliding scale insulin.  Adjust medications as needed for glycemic control.  Hypoglycemia protocol.  Diet, weight loss, exercise education.  Post-operatively, on BiPAP, oxygen therapy.  He is morbidly obese, no history of known obstructive sleep apnea.  He is currently saturating well on 4 liters nasal cannula.  Titrate oxygen as saturations allow.  RT overnight pulse oximetry prior to discharge.  Outpatient sleep study.  Pulmonary hygiene.  Increase activity as tolerated.  Increase mobility.  DVT prophylaxis.  GI prophylaxis.  Supportive care.  Patient reassured.  Case management following for discharge planning.  Anticipate discharge home when cleared by Urology and Infectious disease.  Discussed with patient and nursing.  Discussed with Dr. Robert.       Susan Buitrago, APRN-CNP       [1] amLODIPine, 10 mg, oral, Daily  heparin (porcine), 7,500 Units, subcutaneous, q8h SHELLY  insulin glargine, 80 Units, subcutaneous, Daily  insulin lispro, 0-15 Units, subcutaneous, TID AC  losartan, 100 mg, oral, Daily  metoprolol succinate XL, 25 mg, oral, Daily  piperacillin-tazobactam, 4.5 g, intravenous, q6h  rosuvastatin, 40 mg, oral, Daily  vancomycin, 1,500 mg, intravenous, q12h  [2]    [3] PRN medications: acetaminophen **OR** acetaminophen **OR** acetaminophen, benzocaine-menthol, dextromethorphan-guaifenesin, dextrose, dextrose, glucagon, glucagon, guaiFENesin, ketorolac, melatonin, morphine, ondansetron **OR** ondansetron, oxygen, polyethylene glycol, vancomycin

## 2025-05-23 NOTE — PROGRESS NOTES
Vancomycin Dosing by Pharmacy- Cessation of Therapy    Consult to pharmacy for vancomycin dosing has been discontinued by the prescriber, pharmacy will sign off at this time.    Please call pharmacy if there are further questions or re-enter a consult if vancomycin is resumed.     Brii Jain, [facility]

## 2025-05-23 NOTE — NURSING NOTE
"Assumed care of this pt. Pt is sitting in chair, breathing even and unlabored on RA, wife at bedside. Pt states \"I cheated\" and says he just ate a boxed lunched and drank a regular ginger ale. This nurse reeducated pt on carb controlled diet and discussed choosing low carb or sugar free options and monitoring blood glucose. Pt denies needs at this time. Call light in reach. Continuing to monitor   "

## 2025-05-23 NOTE — NURSING NOTE
Spoke with Dr Robert regarding pt having severe pain, requesting additional pain medication orders

## 2025-05-23 NOTE — PROGRESS NOTES
Pt will not have any skilled needs at dc, will return home.      05/23/25 1322   Discharge Planning   Expected Discharge Disposition Home

## 2025-05-23 NOTE — PROGRESS NOTES
"Procedure explaiined I detail to patient, he understands and all questions answered.  Tolerating merchant, plan for upon discharge to [facility] w Dr Ritchie in 2 weeks.   Blood pressure 141/80, pulse 89, temperature 37.1 °C (98.8 °F), temperature source Oral, resp. rate 18, height 1.702 m (5' 7\"), weight 129 kg (283 lb 8.2 oz), SpO2 97%.     I/O last 3 completed shifts:  In: 1900 (14.8 mL/kg) [I.V.:1000 (7.8 mL/kg); IV Piggyback:900]  Out: 950 (7.4 mL/kg) [Urine:925 (0.2 mL/kg/hr); Blood:25]  Weight: 128.6 kg   I/O this shift:  In: -   Out: 425 [Urine:425]       Results for orders placed or performed during the hospital encounter of 05/21/25 (from the past 96 hours)   CBC and Auto Differential   Result Value Ref Range    WBC 12.3 (H) 4.4 - 11.3 x10*3/uL    nRBC 0.0 0.0 - 0.0 /100 WBCs    RBC 6.01 (H) 4.50 - 5.90 x10*6/uL    Hemoglobin 15.8 13.5 - 17.5 g/dL    Hematocrit 48.9 41.0 - 52.0 %    MCV 81 80 - 100 fL    MCH 26.3 26.0 - 34.0 pg    MCHC 32.3 32.0 - 36.0 g/dL    RDW 14.5 11.5 - 14.5 %    Platelets 181 150 - 450 x10*3/uL    Immature Granulocytes %, Automated 0.5 0.0 - 0.9 %    Immature Granulocytes Absolute, Automated 0.06 0.00 - 0.70 x10*3/uL   Comprehensive metabolic panel   Result Value Ref Range    Glucose 189 (H) 74 - 99 mg/dL    Sodium 137 136 - 145 mmol/L    Potassium 4.3 3.5 - 5.3 mmol/L    Chloride 103 98 - 107 mmol/L    Bicarbonate 28 21 - 32 mmol/L    Anion Gap 10 10 - 20 mmol/L    Urea Nitrogen 11 6 - 23 mg/dL    Creatinine 1.01 0.50 - 1.30 mg/dL    eGFR 89 >60 mL/min/1.73m*2    Calcium 9.6 8.6 - 10.3 mg/dL    Albumin 4.3 3.4 - 5.0 g/dL    Alkaline Phosphatase 74 33 - 120 U/L    Total Protein 7.3 6.4 - 8.2 g/dL    AST 11 9 - 39 U/L    Bilirubin, Total 0.6 0.0 - 1.2 mg/dL    ALT 18 10 - 52 U/L   Magnesium   Result Value Ref Range    Magnesium 1.92 1.60 - 2.40 mg/dL   Manual Differential   Result Value Ref Range    Neutrophils %, Manual 64.0 40.0 - 80.0 %    Lymphocytes %, Manual 24.0 13.0 - 44.0 %    Monocytes " %, Manual 7.0 2.0 - 10.0 %    Eosinophils %, Manual 1.0 0.0 - 6.0 %    Basophils %, Manual 4.0 0.0 - 2.0 %    Seg Neutrophils Absolute, Manual 7.87 (H) 1.20 - 7.00 x10*3/uL    Lymphocytes Absolute, Manual 2.95 1.20 - 4.80 x10*3/uL    Monocytes Absolute, Manual 0.86 0.10 - 1.00 x10*3/uL    Eosinophils Absolute, Manual 0.12 0.00 - 0.70 x10*3/uL    Basophils Absolute, Manual 0.49 (H) 0.00 - 0.10 x10*3/uL    Total Cells Counted 100     RBC Morphology See Below     Polychromasia Mild    POCT GLUCOSE   Result Value Ref Range    POCT Glucose 370 (H) 74 - 99 mg/dL   Urinalysis with Reflex Microscopic   Result Value Ref Range    Color, Urine Colorless (N) Light-Yellow, Yellow, Dark-Yellow    Appearance, Urine Clear Clear    Specific Gravity, Urine 1.020 1.005 - 1.035    pH, Urine 5.5 5.0, 5.5, 6.0, 6.5, 7.0, 7.5, 8.0    Protein, Urine NEGATIVE NEGATIVE, 10 (TRACE), 20 (TRACE) mg/dL    Glucose, Urine OVER (4+) (A) Normal mg/dL    Blood, Urine NEGATIVE NEGATIVE mg/dL    Ketones, Urine NEGATIVE NEGATIVE mg/dL    Bilirubin, Urine NEGATIVE NEGATIVE mg/dL    Urobilinogen, Urine Normal Normal mg/dL    Nitrite, Urine NEGATIVE NEGATIVE    Leukocyte Esterase, Urine NEGATIVE NEGATIVE   CBC   Result Value Ref Range    WBC 7.7 4.4 - 11.3 x10*3/uL    nRBC 0.0 0.0 - 0.0 /100 WBCs    RBC 5.86 4.50 - 5.90 x10*6/uL    Hemoglobin 15.4 13.5 - 17.5 g/dL    Hematocrit 47.0 41.0 - 52.0 %    MCV 80 80 - 100 fL    MCH 26.3 26.0 - 34.0 pg    MCHC 32.8 32.0 - 36.0 g/dL    RDW 14.6 (H) 11.5 - 14.5 %    Platelets 171 150 - 450 x10*3/uL   Comprehensive metabolic panel   Result Value Ref Range    Glucose 230 (H) 74 - 99 mg/dL    Sodium 137 136 - 145 mmol/L    Potassium 3.9 3.5 - 5.3 mmol/L    Chloride 105 98 - 107 mmol/L    Bicarbonate 25 21 - 32 mmol/L    Anion Gap 11 10 - 20 mmol/L    Urea Nitrogen 10 6 - 23 mg/dL    Creatinine 0.92 0.50 - 1.30 mg/dL    eGFR >90 >60 mL/min/1.73m*2    Calcium 8.8 8.6 - 10.3 mg/dL    Albumin 3.9 3.4 - 5.0 g/dL    Alkaline  Phosphatase 68 33 - 120 U/L    Total Protein 6.5 6.4 - 8.2 g/dL    AST 9 9 - 39 U/L    Bilirubin, Total 0.4 0.0 - 1.2 mg/dL    ALT 15 10 - 52 U/L   Hemoglobin A1c   Result Value Ref Range    Hemoglobin A1C 11.0 (H) See comment %    Estimated Average Glucose 269 Not Established mg/dL   POCT GLUCOSE   Result Value Ref Range    POCT Glucose 214 (H) 74 - 99 mg/dL   Vancomycin   Result Value Ref Range    Vancomycin 10.5 5.0 - 20.0 ug/mL   POCT GLUCOSE   Result Value Ref Range    POCT Glucose 176 (H) 74 - 99 mg/dL   Tissue/Wound Culture/Smear    Specimen: Wound/Tissue; Tissue/Biopsy   Result Value Ref Range    Tissue/Wound Culture/Smear No growth to date     Gram Stain (1+) Rare Polymorphonuclear leukocytes     Gram Stain No organisms seen    POCT GLUCOSE   Result Value Ref Range    POCT Glucose 120 (H) 74 - 99 mg/dL   POCT GLUCOSE   Result Value Ref Range    POCT Glucose 125 (H) 74 - 99 mg/dL   POCT GLUCOSE   Result Value Ref Range    POCT Glucose 167 (H) 74 - 99 mg/dL   Blood Culture    Specimen: Peripheral Venipuncture; Blood culture   Result Value Ref Range    Blood Culture Loaded on Instrument - Culture in progress    Blood Culture    Specimen: Peripheral Venipuncture; Blood culture   Result Value Ref Range    Blood Culture Loaded on Instrument - Culture in progress    Vancomycin   Result Value Ref Range    Vancomycin 17.7 5.0 - 20.0 ug/mL   CBC   Result Value Ref Range    WBC 11.9 (H) 4.4 - 11.3 x10*3/uL    nRBC 0.0 0.0 - 0.0 /100 WBCs    RBC 5.50 4.50 - 5.90 x10*6/uL    Hemoglobin 14.4 13.5 - 17.5 g/dL    Hematocrit 45.8 41.0 - 52.0 %    MCV 83 80 - 100 fL    MCH 26.2 26.0 - 34.0 pg    MCHC 31.4 (L) 32.0 - 36.0 g/dL    RDW 14.6 (H) 11.5 - 14.5 %    Platelets 165 150 - 450 x10*3/uL   Basic Metabolic Panel   Result Value Ref Range    Glucose 230 (H) 74 - 99 mg/dL    Sodium 135 (L) 136 - 145 mmol/L    Potassium 4.1 3.5 - 5.3 mmol/L    Chloride 102 98 - 107 mmol/L    Bicarbonate 26 21 - 32 mmol/L    Anion Gap 11 10 -  20 mmol/L    Urea Nitrogen 10 6 - 23 mg/dL    Creatinine 1.01 0.50 - 1.30 mg/dL    eGFR 89 >60 mL/min/1.73m*2    Calcium 8.4 (L) 8.6 - 10.3 mg/dL   POCT GLUCOSE   Result Value Ref Range    POCT Glucose 235 (H) 74 - 99 mg/dL   POCT GLUCOSE   Result Value Ref Range    POCT Glucose 403 (H) 74 - 99 mg/dL    PE:  comfortable, ambulating in room  [place] w deep yellow urine      A/P:  no objection to discharge plan, [facility] Ritchie 2 weeks

## 2025-05-23 NOTE — NURSING NOTE
"Assumed care of pt. Pt lying in bed comfortably at this time. RR even and unlabored on 4L NC. Pt showed the IS and given explanation on how it is utilized after surgery. Pt demonstrated once how to use IS. Pt is inquiring about his diet this AM, and is wondering if he will be able to get breakfast this morning. Dr. Ritchie states that \"order is in there\" with no specifics on why pt is being kept NPO. No new orders at this time. Care ongoing.   "

## 2025-05-23 NOTE — PROGRESS NOTES
Vancomycin Dosing by Pharmacy- FOLLOW UP    Elliot Earl is a 54 y.o. year old male who Pharmacy has been consulted for vancomycin dosing for cellulitis, skin and soft tissue. Based on the patient's indication and renal status this patient is being dosed based on a goal AUC of 400-600.     Renal function is currently stable.    Current vancomycin dose: 1500 mg given every 12 hours    Estimated vancomycin AUC on current dose: 440 mg/L.hr     Visit Vitals  /68 (BP Location: Left arm, Patient Position: Lying)   Pulse 91   Temp 37.1 °C (98.8 °F) (Oral)   Resp 20        Lab Results   Component Value Date    CREATININE 1.01 2025    CREATININE 0.92 2025    CREATININE 1.01 2025    CREATININE 0.79 2025        Patient weight is as follows:   Vitals:    25 0100   Weight: 129 kg (283 lb 8.2 oz)       Cultures:  No results found for the encounter in last 14 days.       I/O last 3 completed shifts:  In: 1900 (14.8 mL/kg) [I.V.:1000 (7.8 mL/kg); IV Piggyback:900]  Out: 950 (7.4 mL/kg) [Urine:925 (0.2 mL/kg/hr); Blood:25]  Weight: 128.6 kg   I/O during current shift:  No intake/output data recorded.    Temp (24hrs), Av.9 °C (98.4 °F), Min:36.1 °C (97 °F), Max:38.3 °C (100.9 °F)      Assessment/Plan    Within goal AUC range. Continue current vancomycin regimen.    This dosing regimen is predicted by [employer] to result in the following pharmacokinetic parameters:    Loading dose: 2g .22 @1:39  Regimen: 1500 mg IV every 12 hours.  Exposure target: AUC24 (range) 400-600 mg/L.hr   [id number]: 437 mg/L.hr  AUC24,ss: 440 mg/L.hr  Probability of AUC24 > 400: 71 %  Ctrough,ss: 12.6 mg/L  Probability of Ctrough,ss > 20: 5 %    The next level will be obtained on  at 05:00. May be obtained sooner if clinically indicated.   Will continue to monitor renal function daily while on vancomycin and order serum creatinine at least every 48 hours if not already ordered.  Follow for continued  vancomycin needs, clinical response, and signs/symptoms of toxicity.       Emma Kc, PharmD

## 2025-05-23 NOTE — NURSING NOTE
Pt states gauze covering drain/surgical site is dry and does not need to be changed. This nurse provides some gauze at bedside in case it should drain more when he gets up for the day as the pt has expressed he would be more comfortable changing it himself.

## 2025-05-23 NOTE — CARE PLAN
The patient's goals for the shift include rest    The clinical goals for the shift include manage pain    Pt reports significantly improved pain this morning. Pt denies needs. Call light in reach. Continuing to monitor

## 2025-05-23 NOTE — NURSING NOTE
Kassidy Buitrago NP rounded on this pt. Pt groin wound assessed. Very little fresh blood coming from penrose drain, broin clean and dry. Merchant clean, dry, and in place.   Pts lunch time blood sugar 403. Pts diet will be changed to carb restricted per Iftikhar LOWE. Care ongoing.    Dr. Ritchie secure chatted about orders for merchant catheter. No new orders in at this time. Dr. Ritchie responded merchant to be maintained for 10 days after surgery to allow the urethra to heal. Care ongoing.

## 2025-05-24 LAB
ANION GAP SERPL CALCULATED.3IONS-SCNC: 10 MMOL/L (ref 10–20)
BACTERIA SPEC CULT: NORMAL
BUN SERPL-MCNC: 11 MG/DL (ref 6–23)
CALCIUM SERPL-MCNC: 8.2 MG/DL (ref 8.6–10.3)
CHLORIDE SERPL-SCNC: 104 MMOL/L (ref 98–107)
CO2 SERPL-SCNC: 28 MMOL/L (ref 21–32)
CREAT SERPL-MCNC: 0.93 MG/DL (ref 0.5–1.3)
EGFRCR SERPLBLD CKD-EPI 2021: >90 ML/MIN/1.73M*2
ERYTHROCYTE [DISTWIDTH] IN BLOOD BY AUTOMATED COUNT: 14.4 % (ref 11.5–14.5)
GLUCOSE BLD MANUAL STRIP-MCNC: 220 MG/DL (ref 74–99)
GLUCOSE BLD MANUAL STRIP-MCNC: 243 MG/DL (ref 74–99)
GLUCOSE BLD MANUAL STRIP-MCNC: 281 MG/DL (ref 74–99)
GLUCOSE BLD MANUAL STRIP-MCNC: 83 MG/DL (ref 74–99)
GLUCOSE SERPL-MCNC: 105 MG/DL (ref 74–99)
GRAM STN SPEC: NORMAL
GRAM STN SPEC: NORMAL
HCT VFR BLD AUTO: 43 % (ref 41–52)
HGB BLD-MCNC: 13.4 G/DL (ref 13.5–17.5)
MCH RBC QN AUTO: 25.6 PG (ref 26–34)
MCHC RBC AUTO-ENTMCNC: 31.2 G/DL (ref 32–36)
MCV RBC AUTO: 82 FL (ref 80–100)
NRBC BLD-RTO: 0 /100 WBCS (ref 0–0)
PLATELET # BLD AUTO: 160 X10*3/UL (ref 150–450)
POTASSIUM SERPL-SCNC: 3.6 MMOL/L (ref 3.5–5.3)
RBC # BLD AUTO: 5.24 X10*6/UL (ref 4.5–5.9)
SODIUM SERPL-SCNC: 138 MMOL/L (ref 136–145)
WBC # BLD AUTO: 7.1 X10*3/UL (ref 4.4–11.3)

## 2025-05-24 PROCEDURE — 2500000002 HC RX 250 W HCPCS SELF ADMINISTERED DRUGS (ALT 637 FOR MEDICARE OP, ALT 636 FOR OP/ED): Performed by: UROLOGY

## 2025-05-24 PROCEDURE — 2500000001 HC RX 250 WO HCPCS SELF ADMINISTERED DRUGS (ALT 637 FOR MEDICARE OP): Performed by: UROLOGY

## 2025-05-24 PROCEDURE — 2500000004 HC RX 250 GENERAL PHARMACY W/ HCPCS (ALT 636 FOR OP/ED): Mod: JZ | Performed by: INTERNAL MEDICINE

## 2025-05-24 PROCEDURE — 36415 COLL VENOUS BLD VENIPUNCTURE: CPT | Performed by: NURSE PRACTITIONER

## 2025-05-24 PROCEDURE — 94660 CPAP INITIATION&MGMT: CPT

## 2025-05-24 PROCEDURE — 2500000004 HC RX 250 GENERAL PHARMACY W/ HCPCS (ALT 636 FOR OP/ED): Mod: JZ | Performed by: UROLOGY

## 2025-05-24 PROCEDURE — 85027 COMPLETE CBC AUTOMATED: CPT | Performed by: NURSE PRACTITIONER

## 2025-05-24 PROCEDURE — 1200000002 HC GENERAL ROOM WITH TELEMETRY DAILY

## 2025-05-24 PROCEDURE — 82374 ASSAY BLOOD CARBON DIOXIDE: CPT | Performed by: NURSE PRACTITIONER

## 2025-05-24 PROCEDURE — 82947 ASSAY GLUCOSE BLOOD QUANT: CPT

## 2025-05-24 RX ADMIN — INSULIN LISPRO 6 UNITS: 100 INJECTION, SOLUTION INTRAVENOUS; SUBCUTANEOUS at 17:46

## 2025-05-24 RX ADMIN — HEPARIN SODIUM 7500 UNITS: 5000 INJECTION INTRAVENOUS; SUBCUTANEOUS at 12:50

## 2025-05-24 RX ADMIN — METOPROLOL SUCCINATE 25 MG: 25 TABLET, EXTENDED RELEASE ORAL at 08:20

## 2025-05-24 RX ADMIN — KETOROLAC TROMETHAMINE 15 MG: 15 INJECTION, SOLUTION INTRAMUSCULAR; INTRAVENOUS at 14:46

## 2025-05-24 RX ADMIN — INSULIN LISPRO 6 UNITS: 100 INJECTION, SOLUTION INTRAVENOUS; SUBCUTANEOUS at 12:50

## 2025-05-24 RX ADMIN — ROSUVASTATIN 40 MG: 20 TABLET, FILM COATED ORAL at 08:20

## 2025-05-24 RX ADMIN — KETOROLAC TROMETHAMINE 15 MG: 15 INJECTION, SOLUTION INTRAMUSCULAR; INTRAVENOUS at 05:38

## 2025-05-24 RX ADMIN — AMLODIPINE BESYLATE 10 MG: 10 TABLET ORAL at 08:20

## 2025-05-24 RX ADMIN — INSULIN GLARGINE 80 UNITS: 100 INJECTION, SOLUTION SUBCUTANEOUS at 08:20

## 2025-05-24 RX ADMIN — LOSARTAN POTASSIUM 100 MG: 100 TABLET, FILM COATED ORAL at 08:20

## 2025-05-24 RX ADMIN — PIPERACILLIN SODIUM AND TAZOBACTAM SODIUM 4.5 G: 4; .5 INJECTION, SOLUTION INTRAVENOUS at 05:38

## 2025-05-24 RX ADMIN — HEPARIN SODIUM 7500 UNITS: 5000 INJECTION INTRAVENOUS; SUBCUTANEOUS at 05:38

## 2025-05-24 RX ADMIN — HEPARIN SODIUM 7500 UNITS: 5000 INJECTION INTRAVENOUS; SUBCUTANEOUS at 21:18

## 2025-05-24 RX ADMIN — PIPERACILLIN SODIUM AND TAZOBACTAM SODIUM 4.5 G: 4; .5 INJECTION, SOLUTION INTRAVENOUS at 11:58

## 2025-05-24 RX ADMIN — PIPERACILLIN SODIUM AND TAZOBACTAM SODIUM 4.5 G: 4; .5 INJECTION, SOLUTION INTRAVENOUS at 17:47

## 2025-05-24 RX ADMIN — KETOROLAC TROMETHAMINE 15 MG: 15 INJECTION, SOLUTION INTRAMUSCULAR; INTRAVENOUS at 22:50

## 2025-05-24 ASSESSMENT — PAIN DESCRIPTION - ORIENTATION: ORIENTATION: INNER

## 2025-05-24 ASSESSMENT — COGNITIVE AND FUNCTIONAL STATUS - GENERAL
MOBILITY SCORE: 24
MOBILITY SCORE: 24
DAILY ACTIVITIY SCORE: 24
DAILY ACTIVITIY SCORE: 24

## 2025-05-24 ASSESSMENT — PAIN - FUNCTIONAL ASSESSMENT
PAIN_FUNCTIONAL_ASSESSMENT: FLACC (FACE, LEGS, ACTIVITY, CRY, CONSOLABILITY)
PAIN_FUNCTIONAL_ASSESSMENT: 0-10
PAIN_FUNCTIONAL_ASSESSMENT: FLACC (FACE, LEGS, ACTIVITY, CRY, CONSOLABILITY)
PAIN_FUNCTIONAL_ASSESSMENT: 0-10

## 2025-05-24 ASSESSMENT — PAIN SCALES - GENERAL
PAINLEVEL_OUTOF10: 7
PAINLEVEL_OUTOF10: 6
PAINLEVEL_OUTOF10: 3
PAINLEVEL_OUTOF10: 3
PAINLEVEL_OUTOF10: 10 - WORST POSSIBLE PAIN
PAINLEVEL_OUTOF10: 0 - NO PAIN
PAINLEVEL_OUTOF10: 0 - NO PAIN

## 2025-05-24 ASSESSMENT — PAIN DESCRIPTION - LOCATION
LOCATION: GROIN
LOCATION: SCROTUM

## 2025-05-24 ASSESSMENT — PAIN DESCRIPTION - DESCRIPTORS
DESCRIPTORS: ACHING
DESCRIPTORS: SHARP;ACHING
DESCRIPTORS: ACHING
DESCRIPTORS: ACHING;NAGGING
DESCRIPTORS: ACHING

## 2025-05-24 NOTE — ASSESSMENT & PLAN NOTE
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  Continue IV antibiotics.  The patient most likely have a sleep apnea.  The patient has been advised to have a sleep study done as an outpatient.  Patient's diabetes uncontrolled.  The patient has been advised to compliant with the medication and instructions and also to be compliant with diet.  The patient saturating 92% room air now.  The patient has been advised to lose weight.  Control pain.  We will take DVT, fall, aspiration, decubitus, and DVT precautions.

## 2025-05-24 NOTE — CARE PLAN
The patient's goals for the shift include rest    The clinical goals for the shift include pain control    Over the shift, the patient did not make progress toward the following goals. Barriers to progression include . Recommendations to address these barriers include .

## 2025-05-24 NOTE — PROGRESS NOTES
Elliot Earl is a 54 y.o. male on day 3 of admission presenting with Perineal abscess.    Subjective   The patient was seen and examined.  Lying in the bed.  Comfortable.  Patient does not look in any acute distress.  Pain is under control.       Objective     Physical Exam  HEENT:  Head externally atraumatic,  extraocular movements intact, oral mucosa moist  Neck:  Supple, no JVP, no palpable adenopathy or thyromegaly.  No carotid bruit.  Chest:  Clear to auscultation and resonant.  Heart:  Regular rate and rhythm, no murmur or gallop could be appreciated.  Abdomen:  Soft, nontender, bowel sounds present, normoactive, no palpable hepatosplenomegaly.  Extremities:  No edema, pulses present, no cyanosis or clubbing.  CNS:  Patient alert, oriented to time, place and person.    No new deficit.  Cranial nerves 2-12 grossly intact  Skin:  No active rash.  Musculoskeletal:  No  apparent joint swelling or erythema, range of movement normal.  Last Recorded Vitals  Heart Rate:  [63-79]   Temp:  [36.3 °C (97.3 °F)-37.2 °C (99 °F)]   Resp:  [16-23]   BP: (129-164)/(78-95)   Weight:  [130 kg (287 lb 7.7 oz)]   SpO2:  [92 %-100 %]     Intake/Output last 3 Shifts:  I/O last 3 completed shifts:  In: 462 (3.5 mL/kg) [P.O.:462]  Out: 2050 (15.7 mL/kg) [Urine:2050 (0.4 mL/kg/hr)]  Weight: 130.4 kg     Relevant Results  No results found for the last 90 days.    Results for orders placed or performed during the hospital encounter of 05/21/25 (from the past 24 hours)   POCT GLUCOSE   Result Value Ref Range    POCT Glucose 331 (H) 74 - 99 mg/dL   POCT GLUCOSE   Result Value Ref Range    POCT Glucose 251 (H) 74 - 99 mg/dL   CBC   Result Value Ref Range    WBC 7.1 4.4 - 11.3 x10*3/uL    nRBC 0.0 0.0 - 0.0 /100 WBCs    RBC 5.24 4.50 - 5.90 x10*6/uL    Hemoglobin 13.4 (L) 13.5 - 17.5 g/dL    Hematocrit 43.0 41.0 - 52.0 %    MCV 82 80 - 100 fL    MCH 25.6 (L) 26.0 - 34.0 pg    MCHC 31.2 (L) 32.0 - 36.0 g/dL    RDW 14.4 11.5 - 14.5  %    Platelets 160 150 - 450 x10*3/uL   Basic Metabolic Panel   Result Value Ref Range    Glucose 105 (H) 74 - 99 mg/dL    Sodium 138 136 - 145 mmol/L    Potassium 3.6 3.5 - 5.3 mmol/L    Chloride 104 98 - 107 mmol/L    Bicarbonate 28 21 - 32 mmol/L    Anion Gap 10 10 - 20 mmol/L    Urea Nitrogen 11 6 - 23 mg/dL    Creatinine 0.93 0.50 - 1.30 mg/dL    eGFR >90 >60 mL/min/1.73m*2    Calcium 8.2 (L) 8.6 - 10.3 mg/dL   POCT GLUCOSE   Result Value Ref Range    POCT Glucose 83 74 - 99 mg/dL   POCT GLUCOSE   Result Value Ref Range    POCT Glucose 243 (H) 74 - 99 mg/dL   POCT GLUCOSE   Result Value Ref Range    POCT Glucose 220 (H) 74 - 99 mg/dL      Current Medications[1]   Assessment/Plan   Assessment & Plan  Perineal abscess    Urethral abscess    HTN (hypertension)    Hyperlipidemia    Diabetes mellitus type 1 (Multi)    Obesity  Acute - on chronic recurring annmarie-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  Continue IV antibiotics.  The patient most likely have a sleep apnea.  The patient has been advised to have a sleep study done as an outpatient.  Patient's diabetes uncontrolled.  The patient has been advised to compliant with the medication and instructions and also to be compliant with diet.  The patient saturating 92% room air now.  The patient has been advised to lose weight.  Control pain.  We will take DVT, fall, aspiration, decubitus, and DVT precautions.           Jeramie Robert MD           [1]   Current Facility-Administered Medications:     acetaminophen (Tylenol) tablet 650 mg, 650 mg, oral, q4h PRN, 650 mg at 05/23/25 0005 **OR** acetaminophen (Tylenol) oral liquid 650 mg, 650 mg, nasogastric tube, q4h PRN **OR** acetaminophen (Tylenol)  suppository 650 mg, 650 mg, rectal, q4h PRN, Mj Ritchie MD    amLODIPine (Norvasc) tablet 10 mg, 10 mg, oral, Daily, Mj Ritchie MD, 10 mg at 05/24/25 0820    benzocaine-menthol (Cepastat Sore Throat) lozenge 1 lozenge, 1 lozenge, Mouth/Throat, q2h PRN, Mj Ritchie MD    dextromethorphan-guaifenesin (Robitussin DM)  mg/5 mL oral liquid 5 mL, 5 mL, oral, q4h PRN, Mj Ritchie MD    dextrose 50 % injection 12.5 g, 12.5 g, intravenous, q15 min PRN, Mj Ritchie MD    dextrose 50 % injection 25 g, 25 g, intravenous, q15 min PRN, Mj Ritchie MD    glucagon (Glucagen) injection 1 mg, 1 mg, intramuscular, q15 min PRN, Mj Ritchie MD    glucagon (Glucagen) injection 1 mg, 1 mg, intramuscular, q15 min PRN, Mj Ritchie MD    guaiFENesin (Mucinex) 12 hr tablet 600 mg, 600 mg, oral, q12h PRN, Mj Ritchie MD    heparin (porcine) injection 7,500 Units, 7,500 Units, subcutaneous, q8h SHELLY, Mj Ritchie MD, 7,500 Units at 05/24/25 1250    insulin glargine (Lantus) injection 80 Units, 80 Units, subcutaneous, Daily, Mj Ritchie MD, 80 Units at 05/24/25 0820    insulin lispro injection 0-15 Units, 0-15 Units, subcutaneous, TID AC, Mj Ritchie MD, 6 Units at 05/24/25 1250    ketorolac (Toradol) injection 15 mg, 15 mg, intravenous, q6h PRN, Jeramie Robert MD, 15 mg at 05/24/25 1446    losartan (Cozaar) tablet 100 mg, 100 mg, oral, Daily, Mj Ritchie MD, 100 mg at 05/24/25 0820    melatonin tablet 6 mg, 6 mg, oral, Nightly PRN, Mj Ritchie MD    metoprolol succinate XL (Toprol-XL) 24 hr tablet 25 mg, 25 mg, oral, Daily, Mj Ritchie MD, 25 mg at 05/24/25 0820    morphine injection 2 mg, 2 mg, intravenous, q4h PRN, Mj Ritchie MD, 2 mg at 05/23/25 1341    ondansetron (Zofran) tablet 4 mg, 4 mg, oral, q8h PRN **OR** ondansetron (Zofran) injection 4 mg, 4 mg, intravenous, q8h PRN, Mj Ritchie MD, 4 mg at 05/22/25  1615    oxygen (O2) therapy, , inhalation, Continuous PRN - O2/gases, Asfi Basilio MD, 4 L/min at 05/22/25 2353    piperacillin-tazobactam (Zosyn) 4.5 g in dextrose (iso)  mL, 4.5 g, intravenous, q6h, Mj Ritchie MD, Stopped at 05/24/25 1230    polyethylene glycol (Glycolax, Miralax) packet 17 g, 17 g, oral, Daily PRN, Mj Ritchie MD    rosuvastatin (Crestor) tablet 40 mg, 40 mg, oral, Daily, Mj Ritchie MD, 40 mg at 05/24/25 0820

## 2025-05-24 NOTE — PROGRESS NOTES
"Feeling good, wishes to go home    Blood pressure 151/78, pulse 63, temperature 37.2 °C (99 °F), temperature source Oral, resp. rate 22, height 1.702 m (5' 7\"), weight 130 kg (287 lb 7.7 oz), SpO2 97%.     I/O last 3 completed shifts:  In: 462 (3.5 mL/kg) [P.O.:462]  Out: 2050 (15.7 mL/kg) [Urine:2050 (0.4 mL/kg/hr)]  Weight: 130.4 kg   I/O this shift:  In: 240 [P.O.:240]  Out: -      Incision is drainng via the penrose serosang, small amount no aroma, urine is clear      A/p:  will be able to work if no lifting more than 10 pounds on discharge, fu next week for drain removal, merchant out in 2-3 weeks  "

## 2025-05-24 NOTE — CARE PLAN
Problem: Respiratory  Goal: No signs of respiratory distress (eg. Use of accessory muscles. Peds grunting)  5/24/2025 0300 by Christina Del Real, RRT  Outcome: Progressing  5/24/2025 0049 by Christina Del Real, RRT  Outcome: Progressing

## 2025-05-24 NOTE — NURSING NOTE
Spoke with Dr Robert regarding FSBG 331. Also notified  that pt did eat boxed lunch and drink  ginger ale roughly an hour before blood glucose was checked. Due to this no insulin orders for now and will recheck in a few hours per MD

## 2025-05-25 VITALS
BODY MASS INDEX: 45.12 KG/M2 | WEIGHT: 287.48 LBS | OXYGEN SATURATION: 94 % | HEART RATE: 74 BPM | DIASTOLIC BLOOD PRESSURE: 88 MMHG | HEIGHT: 67 IN | SYSTOLIC BLOOD PRESSURE: 151 MMHG | RESPIRATION RATE: 18 BRPM | TEMPERATURE: 97.5 F

## 2025-05-25 LAB
GLUCOSE BLD MANUAL STRIP-MCNC: 267 MG/DL (ref 74–99)
GLUCOSE BLD MANUAL STRIP-MCNC: 97 MG/DL (ref 74–99)

## 2025-05-25 PROCEDURE — 94660 CPAP INITIATION&MGMT: CPT

## 2025-05-25 PROCEDURE — 2500000001 HC RX 250 WO HCPCS SELF ADMINISTERED DRUGS (ALT 637 FOR MEDICARE OP): Performed by: INTERNAL MEDICINE

## 2025-05-25 PROCEDURE — 82947 ASSAY GLUCOSE BLOOD QUANT: CPT

## 2025-05-25 PROCEDURE — 2500000002 HC RX 250 W HCPCS SELF ADMINISTERED DRUGS (ALT 637 FOR MEDICARE OP, ALT 636 FOR OP/ED): Performed by: UROLOGY

## 2025-05-25 PROCEDURE — 2500000001 HC RX 250 WO HCPCS SELF ADMINISTERED DRUGS (ALT 637 FOR MEDICARE OP): Performed by: UROLOGY

## 2025-05-25 PROCEDURE — 2500000004 HC RX 250 GENERAL PHARMACY W/ HCPCS (ALT 636 FOR OP/ED): Mod: JZ | Performed by: INTERNAL MEDICINE

## 2025-05-25 PROCEDURE — 2500000004 HC RX 250 GENERAL PHARMACY W/ HCPCS (ALT 636 FOR OP/ED): Mod: JZ | Performed by: UROLOGY

## 2025-05-25 RX ORDER — CEFDINIR 300 MG/1
300 CAPSULE ORAL 2 TIMES DAILY
Status: DISCONTINUED | OUTPATIENT
Start: 2025-05-25 | End: 2025-05-25 | Stop reason: HOSPADM

## 2025-05-25 RX ORDER — CEFDINIR 300 MG/1
300 CAPSULE ORAL 2 TIMES DAILY
Qty: 14 CAPSULE | Refills: 0 | Status: SHIPPED | OUTPATIENT
Start: 2025-05-25 | End: 2025-06-01

## 2025-05-25 RX ADMIN — METOPROLOL SUCCINATE 25 MG: 25 TABLET, EXTENDED RELEASE ORAL at 08:54

## 2025-05-25 RX ADMIN — INSULIN GLARGINE 80 UNITS: 100 INJECTION, SOLUTION SUBCUTANEOUS at 08:54

## 2025-05-25 RX ADMIN — PIPERACILLIN SODIUM AND TAZOBACTAM SODIUM 4.5 G: 4; .5 INJECTION, SOLUTION INTRAVENOUS at 06:58

## 2025-05-25 RX ADMIN — HEPARIN SODIUM 7500 UNITS: 5000 INJECTION INTRAVENOUS; SUBCUTANEOUS at 06:58

## 2025-05-25 RX ADMIN — ROSUVASTATIN 40 MG: 20 TABLET, FILM COATED ORAL at 08:54

## 2025-05-25 RX ADMIN — PIPERACILLIN SODIUM AND TAZOBACTAM SODIUM 4.5 G: 4; .5 INJECTION, SOLUTION INTRAVENOUS at 00:31

## 2025-05-25 RX ADMIN — KETOROLAC TROMETHAMINE 15 MG: 15 INJECTION, SOLUTION INTRAMUSCULAR; INTRAVENOUS at 04:47

## 2025-05-25 RX ADMIN — CEFDINIR 300 MG: 300 CAPSULE ORAL at 10:28

## 2025-05-25 RX ADMIN — LOSARTAN POTASSIUM 100 MG: 100 TABLET, FILM COATED ORAL at 08:54

## 2025-05-25 RX ADMIN — AMLODIPINE BESYLATE 10 MG: 10 TABLET ORAL at 08:54

## 2025-05-25 ASSESSMENT — COGNITIVE AND FUNCTIONAL STATUS - GENERAL
MOBILITY SCORE: 24
DAILY ACTIVITIY SCORE: 24

## 2025-05-25 ASSESSMENT — PAIN DESCRIPTION - LOCATION: LOCATION: SCROTUM

## 2025-05-25 ASSESSMENT — PAIN SCALES - GENERAL
PAINLEVEL_OUTOF10: 0 - NO PAIN
PAINLEVEL_OUTOF10: 7

## 2025-05-25 ASSESSMENT — PAIN DESCRIPTION - DESCRIPTORS: DESCRIPTORS: ACHING

## 2025-05-25 NOTE — PROGRESS NOTES
05/25/25 0918   Discharge Planning   Expected Discharge Disposition Home   Does the patient need discharge transport arranged? No     Patient will discharge home on oral antibiotics per ID, no skilled needs identified.

## 2025-05-25 NOTE — CARE PLAN
The patient's goals for the shift include rest, dc planning    The clinical goals for the shift include manage pain, change dressing as needed

## 2025-05-25 NOTE — ASSESSMENT & PLAN NOTE
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  Continue IV antibiotics.  The patient most likely have a sleep apnea.  The patient has been advised to have a sleep study done as an outpatient.  Patient's diabetes uncontrolled.  The patient has been advised to compliant with the medication and instructions and also to be compliant with diet.  The patient saturating 92% room air now.  The patient has been advised to lose weight.  Control pain.  We will take DVT, fall, aspiration, decubitus, and DVT precautions.    Date of service: 5/25/2025    Plan: Continue current medication.  Continue supportive care.  Physical therapy occupational.  Monitor CBC and BMP.  The patient is medically stable.  Patient has been cleared by both ID and urology for discharge.  The patient is being discharged in stable condition.  Patient has been advised to follow-up with his primary care physician and have the sleep study done as soon as possible.

## 2025-05-25 NOTE — PROGRESS NOTES
Elliot Earl is a 54 y.o. male on day 4 of admission presenting with Perineal abscess.    Subjective   Patient was seen and examined lying in the bed.  Comfortable.  Did not note any acute distress.       Objective     Physical Exam  HEENT:  Head externally atraumatic,  extraocular movements intact, oral mucosa moist  Neck:  Supple, no JVP, no palpable adenopathy or thyromegaly.  No carotid bruit.  Chest:  Clear to auscultation and resonant.  Heart:  Regular rate and rhythm, no murmur or gallop could be appreciated.  Abdomen:  Soft, nontender, obese, bowel sounds present, normoactive, no palpable hepatosplenomegaly.  Extremities:  No edema, pulses present, no cyanosis or clubbing.  CNS:  Patient alert, oriented to time, place and person.    No new deficit.  Cranial nerves 2-12 grossly intact  Skin:  No active rash.  Musculoskeletal:  No  apparent joint swelling or erythema, range of movement normal.  Genitourinary.  Patient Madrid catheter in place.  Patient is a perineal dressing.  No discharge from the surgical wound at this time  Last Recorded Vitals  Heart Rate:  [74-80]   Temp:  [36.4 °C (97.5 °F)-37.1 °C (98.8 °F)]   Resp:  [16-25]   BP: (151-166)/(88-97)   SpO2:  [92 %-98 %]     Intake/Output last 3 Shifts:  I/O last 3 completed shifts:  In: 1820 (14 mL/kg) [P.O.:1220; IV Piggyback:600]  Out: 3900 (29.9 mL/kg) [Urine:3900 (0.8 mL/kg/hr)]  Weight: 130.4 kg     Relevant Results  No results found for the last 90 days.    Results for orders placed or performed during the hospital encounter of 05/21/25 (from the past 24 hours)   POCT GLUCOSE   Result Value Ref Range    POCT Glucose 220 (H) 74 - 99 mg/dL   POCT GLUCOSE   Result Value Ref Range    POCT Glucose 281 (H) 74 - 99 mg/dL   POCT GLUCOSE   Result Value Ref Range    POCT Glucose 97 74 - 99 mg/dL   POCT GLUCOSE   Result Value Ref Range    POCT Glucose 267 (H) 74 - 99 mg/dL      Current Medications[1]   Assessment/Plan   Assessment & Plan  Perineal  abscess    Urethral abscess    HTN (hypertension)    Hyperlipidemia    Diabetes mellitus type 1 (Multi)    Obesity  Acute - on chronic recurring annmarie-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  Continue IV antibiotics.  The patient most likely have a sleep apnea.  The patient has been advised to have a sleep study done as an outpatient.  Patient's diabetes uncontrolled.  The patient has been advised to compliant with the medication and instructions and also to be compliant with diet.  The patient saturating 92% room air now.  The patient has been advised to lose weight.  Control pain.  We will take DVT, fall, aspiration, decubitus, and DVT precautions.    Date of service: 5/25/2025    Plan: Continue current medication.  Continue supportive care.  Physical therapy occupational.  Monitor CBC and BMP.  The patient is medically stable.  Patient has been cleared by both ID and urology for discharge.  The patient is being discharged in stable condition.  Patient has been advised to follow-up with his primary care physician and have the sleep study done as soon as possible.           Jeramie Robert MD          [1] No current facility-administered medications for this encounter.    Current Outpatient Medications:     amLODIPine (Norvasc) 10 mg tablet, Take 1 tablet (10 mg) by mouth once daily., Disp: 30 tablet, Rfl: 0    cefdinir (Omnicef) 300 mg capsule, Take 1 capsule (300 mg) by mouth 2 times a day for 14 doses., Disp: 14 capsule, Rfl: 0    insulin glargine (Lantus Solostar U-100 Insulin) 100 unit/mL (3 mL) pen, Inject 80 Units under the skin once daily at bedtime., Disp: 30 mL, Rfl: 0    losartan (Cozaar) 100 mg tablet, Take 1 tablet (100 mg) by mouth once  daily., Disp: 30 tablet, Rfl: 0    metoprolol succinate XL (Toprol-XL) 25 mg 24 hr tablet, Take 1 tablet (25 mg) by mouth once daily., Disp: 30 tablet, Rfl: 0    rosuvastatin (Crestor) 40 mg tablet, Take 1 tablet (40 mg) by mouth once daily., Disp: 30 tablet, Rfl: 0

## 2025-05-25 NOTE — NURSING NOTE
Pt A&O x4  currently resting in bed. No complaints or concerns. Call light within reach.  Pt continues on atb per order.  Madrid in place per order. Dsg dry and intact to perirectal area.

## 2025-05-25 NOTE — NURSING NOTE
Took over care of pt at this time. Pt laying in bed, alert, aox4. Pt acknowledges discomfort but is waiting for toradol availability. Pt has no other needs or complaints at this time. I noticed gauze had shifted away from penrose drain, so I place two more 4x4 under and over drain. Scrotum tender to touch. Pt oriented to call bell and it and belongings are placed in reach. Telemetry leads connected and reading on monitor. Bed alarm refused. Continuing to monitor.

## 2025-05-25 NOTE — DISCHARGE SUMMARY
Discharge Diagnosis  Perineal abscess           Issues Requiring Follow-Up  Perineal abscess  Ureteral abscess  Hypertension  Hyperlipidemia  Diabetes mellitus type 1  Morbid obesity  Sleep apnea  Hypertension  Acute hypoxic respiratory failure      Discharge Meds     Medication List      START taking these medications     cefdinir 300 mg capsule; Commonly known as: Omnicef; Take 1 capsule (300   mg) by mouth 2 times a day for 14 doses.     CONTINUE taking these medications     amLODIPine 10 mg tablet; Commonly known as: Norvasc; Take 1 tablet (10   mg) by mouth once daily.   Lantus Solostar U-100 Insulin 100 unit/mL (3 mL) pen; Generic drug:   insulin glargine; Inject 80 Units under the skin once daily at bedtime.   losartan 100 mg tablet; Commonly known as: Cozaar; Take 1 tablet (100   mg) by mouth once daily.   metoprolol succinate XL 25 mg 24 hr tablet; Commonly known as:   Toprol-XL; Take 1 tablet (25 mg) by mouth once daily.   rosuvastatin 40 mg tablet; Commonly known as: Crestor; Take 1 tablet (40   mg) by mouth once daily.     STOP taking these medications     dextran 70-hypromellose 0.1-0.3 % ophthalmic solution; Commonly known   as: Bion Tears   ibuprofen 600 mg tablet       Test Results Pending At Discharge  Pending Labs       Order Current Status    Blood Culture Preliminary result    Blood Culture Preliminary result            Hospital Course   Patient admitted, complaint of perineal pain, fever, chills or rigor.  The patient has a perianal abscess.  The patient had I&D done.  Patient seen by urology.  ID consult was obtained.  The patient was treated with broad-spectrum antibiotics.  The patient stabilized.  The patient was cleared by urology and ID.  The patient has been discharged home on oral antibiotics.  The patient was advised to have sleep study done as an outpatient after discharge.  Pertinent Physical Exam At Time of Discharge  Physical Exam    Outpatient Follow-Up  No future  appointments.      Jeramie Robert MD

## 2025-05-25 NOTE — DISCHARGE INSTR - OTHER ORDERS
will be able to work if no lifting more than 10 pounds on discharge, follow up next week with Dr. Ritchie for drain removal, merchant out in 2-3 weeks

## 2025-05-25 NOTE — PROGRESS NOTES
INFECTIOUS DISEASES PROGRESS NOTE    Consulted / following patient for:  Urethral fistula  Perineal abscess    Subjective   Interval History:   Feels well and is hoping to be discharged.  Has questions about returning to work     Objective   PHYSICAL EXAMINATION  Vital signs:  Visit Vitals  BP (!) 166/93 (BP Location: Left arm, Patient Position: Lying)   Pulse 74   Temp 36.7 °C (98.1 °F) (Axillary)   Resp 17      General: Resting comfortably, no acute distress  Abdomen:  Soft, nontender. No palpable organs or masses  Genitalia: Madrid catheter draining clear urine.  Small amount of dried blood on the inferior portion of the scrotum    Relevant Results  WBC: 7100    Results from last 72 hours   Lab Units 05/24/25  0426   CREATININE mg/dL 0.93   ANION GAP mmol/L 10   EGFR mL/min/1.73m*2 >90     Microbiology:  Blood and urine, no growth      ASSESSMENT:  Urethritis/urinary tract infection  Recurrent urethral stricture and development of fistula.  Now day 3 S/P I&D and exploration.  Unremarkable examination.  Cultures negative.  Will change to empiric cefdinir for 7 more days.  Discharge okay from my perspective.      Type 2 diabetes        PLANS:  -   Change empiric therapy to cefdinir 300 mg p.o. twice daily for 7 more days (I have sent a prescription to his community pharmacy)  -    Discharge okay from my perspective, follow-up with me prn.  -    Secure chart message sent to Dr. Robert    WILL SIGN OFF.  PLEASE RE-CONSULT PRN.  THANK YOU.    Jeffrey Gonzalez MD  [email or web address]  Office:  [phone]

## 2025-05-26 DIAGNOSIS — L02.215 PERINEAL ABSCESS: Primary | ICD-10-CM

## 2025-05-26 RX ORDER — TRAMADOL HYDROCHLORIDE 50 MG/1
50 TABLET, FILM COATED ORAL EVERY 6 HOURS PRN
Qty: 20 TABLET | Refills: 0 | Status: SHIPPED | OUTPATIENT
Start: 2025-05-26 | End: 2025-06-02

## 2025-05-27 DIAGNOSIS — L02.215 ABSCESS OF PERINEUM: Primary | ICD-10-CM

## 2025-05-27 LAB
BACTERIA BLD CULT: NORMAL
BACTERIA BLD CULT: NORMAL

## 2025-05-27 RX ORDER — OXYCODONE HYDROCHLORIDE 5 MG/1
5 TABLET ORAL EVERY 6 HOURS PRN
Qty: 15 TABLET | Refills: 0 | Status: SHIPPED | OUTPATIENT
Start: 2025-05-27

## 2025-05-28 ENCOUNTER — APPOINTMENT (OUTPATIENT)
Dept: SURGERY | Facility: CLINIC | Age: 55
End: 2025-05-28
Payer: MEDICAID

## 2025-06-02 ENCOUNTER — APPOINTMENT (OUTPATIENT)
Age: 55
End: 2025-06-02
Payer: MEDICAID

## 2025-06-02 ENCOUNTER — E-CONSULT (OUTPATIENT)
Dept: ENDOCRINOLOGY | Facility: CLINIC | Age: 55
End: 2025-06-02

## 2025-06-02 VITALS
HEART RATE: 109 BPM | WEIGHT: 227 LBS | DIASTOLIC BLOOD PRESSURE: 99 MMHG | SYSTOLIC BLOOD PRESSURE: 143 MMHG | BODY MASS INDEX: 35.55 KG/M2

## 2025-06-02 DIAGNOSIS — E10.65 TYPE 1 DIABETES MELLITUS WITH HYPERGLYCEMIA (MULTI): ICD-10-CM

## 2025-06-02 DIAGNOSIS — E10.9 TYPE 1 DIABETES MELLITUS WITHOUT COMPLICATION: Primary | ICD-10-CM

## 2025-06-02 DIAGNOSIS — N34.0 URETHRAL ABSCESS: Primary | ICD-10-CM

## 2025-06-02 DIAGNOSIS — N36.0 URETHROCUTANEOUS FISTULA: ICD-10-CM

## 2025-06-02 DIAGNOSIS — N34.0 URETHRAL ABSCESS: ICD-10-CM

## 2025-06-02 PROCEDURE — 99451 NTRPROF PH1/NTRNET/EHR 5/>: CPT | Performed by: INTERNAL MEDICINE

## 2025-06-02 PROCEDURE — 3080F DIAST BP >= 90 MM HG: CPT | Performed by: UROLOGY

## 2025-06-02 PROCEDURE — 3077F SYST BP >= 140 MM HG: CPT | Performed by: UROLOGY

## 2025-06-02 PROCEDURE — 99215 OFFICE O/P EST HI 40 MIN: CPT | Performed by: UROLOGY

## 2025-06-02 PROCEDURE — 3046F HEMOGLOBIN A1C LEVEL >9.0%: CPT | Performed by: UROLOGY

## 2025-06-02 NOTE — PROGRESS NOTES
E-Consult note:     Thank you for the e consult .    Summary of data review:   Patient with type 1/2 diabetes uncontrolled with a hemoglobin A1c of 11.  Recently discharged from the hospital needs surgery. However prior his intervention , he needs BG improvement.  He is taking glargine insulin per medication list   The blood sugars needs to be improved  for surgical instruments       Findings / recommendations:   After careful review of the patient's information available in the medical record, the following are my findings and recommendations:     This patient really requires full consultation, E consult is peer to peer recommendation.  I believe you do not want to manage diabetes yourself . Rather you are asking endocrine team  to help this patient prior to the procedure.  If this is the correct ask can you please order a consult request for endocrinology I will have my team arrange an appointment for him to see us as soon as possible    Diagnosis:    1. Type 1 diabetes mellitus without complication    2. Urethral abscess         eConsult Time: 10 minutes      Loly Yu MD      6/2/2025

## 2025-06-04 ENCOUNTER — APPOINTMENT (OUTPATIENT)
Dept: SLEEP MEDICINE | Facility: CLINIC | Age: 55
End: 2025-06-04
Payer: MEDICAID

## 2025-06-04 VITALS
WEIGHT: 271 LBS | HEIGHT: 67 IN | DIASTOLIC BLOOD PRESSURE: 88 MMHG | OXYGEN SATURATION: 95 % | HEART RATE: 99 BPM | SYSTOLIC BLOOD PRESSURE: 124 MMHG | BODY MASS INDEX: 42.53 KG/M2

## 2025-06-04 DIAGNOSIS — E66.01 MORBID OBESITY (MULTI): ICD-10-CM

## 2025-06-04 DIAGNOSIS — G47.19 EXCESSIVE DAYTIME SLEEPINESS: ICD-10-CM

## 2025-06-04 DIAGNOSIS — G47.30 SLEEP-RELATED BREATHING DISORDER: Primary | ICD-10-CM

## 2025-06-04 DIAGNOSIS — R06.83 SNORING: ICD-10-CM

## 2025-06-04 PROCEDURE — 3008F BODY MASS INDEX DOCD: CPT | Performed by: PHYSICIAN ASSISTANT

## 2025-06-04 PROCEDURE — 3079F DIAST BP 80-89 MM HG: CPT | Performed by: PHYSICIAN ASSISTANT

## 2025-06-04 PROCEDURE — 99244 OFF/OP CNSLTJ NEW/EST MOD 40: CPT | Performed by: PHYSICIAN ASSISTANT

## 2025-06-04 PROCEDURE — 3046F HEMOGLOBIN A1C LEVEL >9.0%: CPT | Performed by: PHYSICIAN ASSISTANT

## 2025-06-04 PROCEDURE — 3074F SYST BP LT 130 MM HG: CPT | Performed by: PHYSICIAN ASSISTANT

## 2025-06-04 ASSESSMENT — SLEEP AND FATIGUE QUESTIONNAIRES
HOW LIKELY ARE YOU TO NOD OFF OR FALL ASLEEP WHILE SITTING AND READING: MODERATE CHANCE OF DOZING
HOW LIKELY ARE YOU TO NOD OFF OR FALL ASLEEP WHILE SITTING QUIETLY AFTER LUNCH WITHOUT ALCOHOL: WOULD NEVER DOZE
HOW LIKELY ARE YOU TO NOD OFF OR FALL ASLEEP WHILE WATCHING TV: MODERATE CHANCE OF DOZING
HOW LIKELY ARE YOU TO NOD OFF OR FALL ASLEEP WHEN YOU ARE A PASSENGER IN A CAR FOR AN HOUR WITHOUT A BREAK: WOULD NEVER DOZE
HOW LIKELY ARE YOU TO NOD OFF OR FALL ASLEEP WHILE SITTING AND TALKING TO SOMEONE: WOULD NEVER DOZE
SITING INACTIVE IN A PUBLIC PLACE LIKE A CLASS ROOM OR A MOVIE THEATER: WOULD NEVER DOZE
HOW LIKELY ARE YOU TO NOD OFF OR FALL ASLEEP WHILE LYING DOWN TO REST IN THE AFTERNOON WHEN CIRCUMSTANCES PERMIT: MODERATE CHANCE OF DOZING
HOW LIKELY ARE YOU TO NOD OFF OR FALL ASLEEP IN A CAR, WHILE STOPPED FOR A FEW MINUTES IN TRAFFIC: WOULD NEVER DOZE
ESS-CHAD TOTAL SCORE: 6

## 2025-06-04 ASSESSMENT — PAIN SCALES - GENERAL: PAINLEVEL_OUTOF10: 0-NO PAIN

## 2025-06-04 NOTE — PATIENT INSTRUCTIONS
Thank you for coming to the Sleep Medicine Clinic today! Your sleep medicine provider today was: Pool Jackman PA-C Below is a summary of your treatment plan, other important information, and our contact numbers:      TREATMENT PLAN     Call [phone]), option 3 to schedule your sleep study. When you have an appointment please call us back at [phone] to schedule a followup appointment 3-4 weeks after to review results.    Obstructive Sleep Apnea (TRINH) is a sleep disorder where your upper airway muscles relax during sleep and the airway intermittently and repetitively narrows and collapses leading to partially blocked airway (hypopnea) or completely blocked airway (apnea) which, in turn, can disrupt breathing in sleep, lower oxygen levels while you sleep and cause night time wakings. Because both apnea and hypopnea may cause higher carbon dioxide or low oxygen levels, untreated TRINH can lead to heart arrhythmia, elevation of blood pressure, and make it harder for the body to consolidate memory and facilitate metabolism (leading to higher blood sugars at night). Frequent partial arousals occur during sleep resulting in sleep deprivation and daytime sleepiness. TRINH is associated with an increased risk of cardiovascular disease, stroke, hypertension, and insulin resistance. Moreover, untreated TRINH with excessive daytime sleepiness can increase the risk of motor vehicular accidents.    Some conservative strategies for TRINH regardless of TRINH severity are:   Positional therapy - Avoid sleeping on your back.   Healthy diet and regular exercise to optimize weight is highly encouraged.   Avoid alcohol late in the evening and sedative-hypnotics as these substances can make sleep apnea worse.   Improve breathing through the nose with intranasal steroid spray, saline rinse, or antihistamines    Safety: Avoid driving vehicle and operating heavy equipment while sleepy. Drowsy driving may lead to life-threatening  motor vehicle accidents. A person driving while sleepy is 5 times more likely to have an accident. If you feel sleepy, pull over and take a short power nap (sleep for less than 30 minutes). Otherwise, ask somebody to drive you.    Treatment options for sleep apnea include weight management, positional therapy, Positive Airway Therapy (PAP) therapy, oral appliance therapy, hypoglossal nerve stimulator (Inspire) and select airway surgeries.      OUR SLEEP TESTING LOCATIONS     Our team will contact you to schedule your sleep study, however, you can contact us as follow:  Main Phone Line (scheduling only): [phone]), option 3  Adult and Pediatric Locations  [place] (6 years and older): [facility] - 4th floor ([address]) After hours line: [phone]  [facility] (Main campus: All ages): [facility], 6th floor. After hours line: [phone]   [place] (18 years and older): [address] [facility], 2nd floor   [place] (18 years and older): [address] [facility]; 4th floor  After hours line: [phone]  [facility] (18 years and older) at [place]: [address] [facility]  After hours line: [phone]    [place] (5 years and older; younger considered on case-by-case basis): [address]; [address], [address]. Scheduling  After hours line: [phone]   [place] (6 years and older): [address] ; [address]; [address]   [place] (6 years and older): [address] [facility] [address] [facility], [address]  After hours line: [phone]   [place] (13 years and older) in [place]: [address], 2nd floor  After hours line: [phone]   [place] (13 year and older): [address]  After hours line: [phone]      IMPORTANT PHONE NUMBERS     Sleep Medicine Clinic Fax: [phone]  Appointments (for Adult Sleep Clinic): [phone] - option 2  Appointments (For Sleep Studies):  [phone] - option 3  Behavioral Sleep Medicine: [phone]    [facility] ([employer]): [phone]  For clinical questions and refilling prescriptions: [phone]  Rosalinda Felix (For Maddy/Augustina): P: [phone]  F: [phone]       CONTACTING YOUR SLEEP MEDICINE PROVIDER     Send a message directly to your provider through \"My Chart\", which is the email service through your  Records Account: [email or web address]   Call [phone] and leave a message. One of the administrative assistants will forward the message to your sleep medicine provider through \"My Chart\" and/or email.     Your sleep medicine provider for this visit was: Pool Jackman PA-C  "

## 2025-06-04 NOTE — PROGRESS NOTES
"[facility]  New Visit Note        HISTORY OF PRESENT ILLNESS     The patient's referring provider is: Jeramie Robert MD    HISTORY OF PRESENT ILLNESS   Elliot Earl is a 54 y.o. male who presents to a [facility] for a sleep medicine evaluation with concerns of Snoring, Sleep Study, Excessive Daytime Sleepiness, and sleep talking.     PAST SLEEP HISTORY    Patient has the following sleep study results: no past SS    CURRENT HISTORY    He was referred due to snoring observed during surgery for a urethral abscess removal. His wife is with him today and helped with history. His wife reports loud snoring, stopping breathing, and gasping.    He has a strong family history of sleep apnea including two brothers and a sister.    Wife reports snoring for 22 years, gasping and stopping breathing are newer symptoms. He reports some excessive daytime sleepiness, mostly when sitting or inactive. He talks in sleep frequently.    Hypertension present and on losartan and metoprolol.    STOP BAN    SLEEP RELATED-ROS:  SLEEP SCHEDULE WEEKDAYS/WORKDAYS  Usual Bedtime: Midnight  Falls asleep around: Midnight  Wake time: 8:00 AM    SLEEP SCHEDULE WEEKENDS/NON-WORKDAYS:  Usual Bedtime: 2:00 AM  Falls asleep around: 2:00 AM  Wake time: 10:00 AM    NAPS: Does not usually nap.    AVERAGE SLEEP DURATION: 8 hours/day    PREFERRED SLEEP POSITION: Side, stomach    SLEEP INITIATION: No issues falling asleep.    SLEEP MAINTENANCE: No specific number of awakenings mentioned.    RECREATIONAL DRUG USE  SMOKING: Smokes one pack a day.  ALCOHOL: Drinks alcohol occasionally.  CAFFEINE: Never uses caffeine.  MARIJUANA: Never uses.    ESS: 6      PHYSICAL EXAM     VITAL SIGNS: /88   Pulse 99   Ht 1.702 m (5' 7\")   Wt 123 kg (271 lb)   SpO2 95%   BMI 42.44 kg/m²      PREVIOUS WEIGHTS:  Wt Readings from Last 3 Encounters:   25 123 kg (271 lb)   25 103 " "kg (227 lb)   05/24/25 130 kg (287 lb 7.7 oz)       PHYSICAL EXAM: GENERAL: alert oriented x 3 pleasant and cooperative no acute distress  MODIFIED MALLAMPATI SCORE: 2+  LATERAL PHARYNGEAL WALL: 2+  NECK EXAM: normal supple no adenopathy    RESULTS/DATA     No results found for: \"IRON\", \"TRANSFERRIN\", \"IRONSAT\", \"TIBC\", \"FERRITIN\"    ASSESSMENT/PLAN     Mr. Earl is a 54 y.o. male and was referred to the [facility] for the following issues:    OBSTRUCTIVE SLEEP APNEA / SLEEPINESS/ FREQUENT WAKING  -Ordering sleep study to evaluate    BMI>40  -Body mass index is 42.44 kg/m².  today  -With sufficient weight loss may no longer require treatment for TRINH    HYPERTENSION  BP Readings from Last 3 Encounters:   06/04/25 124/88   06/02/25 (!) 143/99   05/25/25 151/88      -may benefit from TRINH treatment    Followup 3 weeks after sleep study to review results   "

## 2025-06-09 ENCOUNTER — APPOINTMENT (OUTPATIENT)
Age: 55
End: 2025-06-09
Payer: COMMERCIAL

## 2025-06-09 PROBLEM — N36.0 URETHROCUTANEOUS FISTULA: Status: ACTIVE | Noted: 2025-06-09

## 2025-06-09 NOTE — PROGRESS NOTES
"CHIEF COMPLAINT:  Patient presents to the office today to discuss:  1. Urethral stricture status post urethroplasty  2. 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 Indwelling urethral catheter management    PAST UROLOGICAL HISTORY:  54-year-old man with history of severe brittle diabetes and urethral stricture previously treated with urethroplasty. Developed recurrent fluid collections along the ischial cavernosus muscle with suspected fistulous tract. Most recent intervention in May 2025 by Dr. Ritchie involved attempted unroofing of sinus tract which resulted in urethral injury requiring catheterization. Patient works as a boxing . Continues to smoke, though reduced recently due to illness.    HPI TODAY 06/09/2025:  - Returns for follow-up after recent surgery by Dr. Ritchie in May 2025 which resulted in urethral injury requiring catheter placement.  - Reports pain at catheter site. Describes feeling \"a knot\" in the area.  - Unable to return to work due to current restrictions.  - Continues to smoke though reports decreased amount from baseline.  - Most recent HbA1c 11%, improved from previous 13% but still significantly elevated from target of 6%.  - Currently on insulin therapy for diabetes management.    PHYSICAL EXAMINATION:  Constitutional: Alert and oriented. No acute distress.   exam: Indwelling urethral catheter in place. Palpable nodule at catheter site with tenderness on examination. Visible surgical site from recent drainage procedure. Dissolving suture noted at incision site.    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    1. 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 Ritchie in May 2025 with resultant urethral injury requiring catheterization.  - Plan: Urgent retrograde urethrogram to evaluate for urethral integrity before catheter " removal. Will attempt to schedule at [facility] where technique has been previously demonstrated to radiology staff.  - Counseling: Explained need to confirm urethral healing before catheter removal. Discussed that any future repair would require improved glycemic control for proper healing.    2. Poorly controlled diabetes mellitus (E11.9)  - Assessment: HbA1c 11%, improved from 13% but still significantly elevated.  - Plan: Referral to endocrinology for specialized diabetes management.  - Counseling: Emphasized critical importance of diabetes control for wound healing and success of any future urological interventions.    3. Tobacco use (F17.210)  - Assessment: Continues to smoke, though reports recent reduction.  - Plan: Counseled on smoking cessation.  - Counseling: Discussed importance of smoking cessation for improved healing and overall health.    ORDERS:  Retrograde urethrogram (stat)  Endocrinology referral    FOLLOW UP:  Return visit after retrograde urethrogram for potential catheter removal if study shows urethral integrity. Anticipate follow-up within 1-2 weeks.    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 Plan includes urgent retrograde urethrogram to evaluate urethral integrity before catheter removal and endocrinology referral for poorly controlled diabetes.

## 2025-06-11 DIAGNOSIS — N32.89 BLADDER SPASM: ICD-10-CM

## 2025-06-12 ENCOUNTER — CLINICAL SUPPORT (OUTPATIENT)
Dept: SLEEP MEDICINE | Facility: HOSPITAL | Age: 55
End: 2025-06-12
Payer: MEDICAID

## 2025-06-12 DIAGNOSIS — G47.19 EXCESSIVE DAYTIME SLEEPINESS: ICD-10-CM

## 2025-06-12 DIAGNOSIS — G47.30 SLEEP-RELATED BREATHING DISORDER: ICD-10-CM

## 2025-06-13 VITALS
HEIGHT: 67 IN | HEART RATE: 97 BPM | RESPIRATION RATE: 28 BRPM | OXYGEN SATURATION: 95 % | BODY MASS INDEX: 42.56 KG/M2 | WEIGHT: 271.17 LBS | DIASTOLIC BLOOD PRESSURE: 97 MMHG | SYSTOLIC BLOOD PRESSURE: 130 MMHG

## 2025-06-13 ASSESSMENT — SLEEP AND FATIGUE QUESTIONNAIRES
HOW LIKELY ARE YOU TO NOD OFF OR FALL ASLEEP WHEN YOU ARE A PASSENGER IN A CAR FOR AN HOUR WITHOUT A BREAK: WOULD NEVER DOZE
HOW LIKELY ARE YOU TO NOD OFF OR FALL ASLEEP IN A CAR, WHILE STOPPED FOR A FEW MINUTES IN TRAFFIC: WOULD NEVER DOZE
SITING INACTIVE IN A PUBLIC PLACE LIKE A CLASS ROOM OR A MOVIE THEATER: MODERATE CHANCE OF DOZING
HOW LIKELY ARE YOU TO NOD OFF OR FALL ASLEEP WHILE SITTING AND TALKING TO SOMEONE: WOULD NEVER DOZE
HOW LIKELY ARE YOU TO NOD OFF OR FALL ASLEEP WHILE SITTING QUIETLY AFTER LUNCH WITHOUT ALCOHOL: WOULD NEVER DOZE
HOW LIKELY ARE YOU TO NOD OFF OR FALL ASLEEP WHILE WATCHING TV: MODERATE CHANCE OF DOZING
HOW LIKELY ARE YOU TO NOD OFF OR FALL ASLEEP WHILE LYING DOWN TO REST IN THE AFTERNOON WHEN CIRCUMSTANCES PERMIT: WOULD NEVER DOZE
HOW LIKELY ARE YOU TO NOD OFF OR FALL ASLEEP WHILE SITTING AND READING: MODERATE CHANCE OF DOZING
ESS-CHAD TOTAL SCORE: 6

## 2025-06-13 NOTE — PROGRESS NOTES
RUST TECH NOTE:     Patient: Elliot Earl   MRN//AGE: [id number]  1970  54 y.o.   Technologist: Crystal Kim   Room: 4   Service Date: 2025        Sleep Testing Location: [facility]  Neck: 44.5 cm  Prim: 6    TECHNOLOGIST SLEEP STUDY PROCEDURE NOTE:   This sleep study is being conducted according to the policies and procedures outlined by the AAS accreditation standards. The sleep study procedure and processes involved during this appointment were explained to the patient/patient’s family, questions were answered. The patient/family verbalized understanding.      The patient is a 54 y.o. year old male scheduled for a split night study.    The study that was ultimately completed was a diagnostic PSG.    The full study was completed.  Patient questionnaires completed?: yes   Consents signed? yes    Initial Fall Risk Screening:     Elliot has not fallen in the last 6 months. Elliot does not have a fear of falling. He does not need assistance with sitting, standing, or walking. He does not need assistance walking in his home. He does not need assistance in an unfamiliar setting. The patient is not using an assistive device.     Brief Study observations:  Split criteria was met. CPAP was initiated. Patient tolerated CPAP very well.     If PAP, which was preferred mask/pressure/mode:  CPAP / Davian DreamWear nasal mask (medium cushion with medium frame)    After the procedure, the patient/family was informed to ensure followup with ordering clinician for testing results.      Technologist: FERN Lucas

## 2025-06-16 ENCOUNTER — HOSPITAL ENCOUNTER (OUTPATIENT)
Dept: RADIOLOGY | Facility: HOSPITAL | Age: 55
Discharge: HOME | End: 2025-06-16
Payer: MEDICAID

## 2025-06-16 VITALS
DIASTOLIC BLOOD PRESSURE: 95 MMHG | RESPIRATION RATE: 20 BRPM | OXYGEN SATURATION: 95 % | HEART RATE: 86 BPM | SYSTOLIC BLOOD PRESSURE: 168 MMHG

## 2025-06-16 DIAGNOSIS — E10.65 TYPE 1 DIABETES MELLITUS WITH HYPERGLYCEMIA (MULTI): ICD-10-CM

## 2025-06-16 DIAGNOSIS — N34.0 URETHRAL ABSCESS: ICD-10-CM

## 2025-06-16 PROCEDURE — 51610 INJECTION FOR BLADDER X-RAY: CPT | Performed by: RADIOLOGY

## 2025-06-16 PROCEDURE — 2550000001 HC RX 255 CONTRASTS: Performed by: RADIOLOGY

## 2025-06-16 PROCEDURE — 74450 X-RAY URETHRA/BLADDER: CPT | Performed by: RADIOLOGY

## 2025-06-16 PROCEDURE — 74450 X-RAY URETHRA/BLADDER: CPT

## 2025-06-16 PROCEDURE — C1887 CATHETER, GUIDING: HCPCS

## 2025-06-16 PROCEDURE — 2720000007 HC OR 272 NO HCPCS

## 2025-06-16 RX ORDER — OXYBUTYNIN CHLORIDE 5 MG/1
5 TABLET ORAL 3 TIMES DAILY
Qty: 90 TABLET | Refills: 5 | Status: SHIPPED | OUTPATIENT
Start: 2025-06-16 | End: 2025-12-13

## 2025-06-16 RX ADMIN — IOHEXOL 30 ML: 350 INJECTION, SOLUTION INTRAVENOUS at 08:57

## 2025-06-16 ASSESSMENT — PAIN - FUNCTIONAL ASSESSMENT
PAIN_FUNCTIONAL_ASSESSMENT: 0-10
PAIN_FUNCTIONAL_ASSESSMENT: 0-10

## 2025-06-16 ASSESSMENT — PAIN SCALES - GENERAL
PAINLEVEL_OUTOF10: 0 - NO PAIN
PAINLEVEL_OUTOF10: 0 - NO PAIN

## 2025-06-16 NOTE — Clinical Note
4Fr catheter removed, merchant catheter remaining in place. Per Dr García - pt to follow up with Dr Quiroz. Pt tolerated procedure well.

## 2025-06-16 NOTE — PROCEDURES
Interventional Radiology Brief Postprocedure Note    Attending: Rohit García MD    Assistant:   Staff Role   Rosa Mccabe MT Radiology Technologist   Maura Can MT Radiology Technologist   Rohit García MD Radiologist   Chiara Butcher, RN Radiology Nurse       Diagnosis:   1. Urethral abscess  FL RUG retrograde urethrogram    FL RUG retrograde urethrogram      2. Type 1 diabetes mellitus with hyperglycemia (Multi)  FL RUG retrograde urethrogram    FL RUG retrograde urethrogram          Description of procedure: FL RUG retrograde urethrogram. Please see radiology note for interpretation. Note is made of probably retained suture needle in the perioneal soft tissues.     Timeout:  Yes    Procedure Area: Procedure Area     Anesthesia:   Local/Topical    Complications: None    Estimated Blood Loss: none    Medications (Filter: Administrations occurring from 0802 to 0836 on 06/16/25) As of 06/16/25 0836      None          No specimens collected      See detailed result report with images in PACS.    The patient tolerated the procedure well without incident or complication and is in stable condition.

## 2025-06-16 NOTE — DISCHARGE INSTRUCTIONS
Reviewed DC instructions with pt - pt verbalized understanding. Pt instructed to follow up with Dr. Quiroz and watch for signs of infection - fever, chills, purulent urine

## 2025-06-16 NOTE — Clinical Note
4Fr catheter inserted into penis alongside the merchant cathter that was in place before coming in for procedure, pt tolerating well.

## 2025-06-19 ENCOUNTER — TELEPHONE (OUTPATIENT)
Dept: ENDOCRINOLOGY | Facility: CLINIC | Age: 55
End: 2025-06-19
Payer: MEDICAID

## 2025-06-19 NOTE — TELEPHONE ENCOUNTER
Tried to call patient about his upcoming appointment tomorrow. No answer even tried to call wife left vm to return call.

## 2025-06-20 ENCOUNTER — APPOINTMENT (OUTPATIENT)
Dept: ENDOCRINOLOGY | Facility: CLINIC | Age: 55
End: 2025-06-20
Payer: COMMERCIAL

## 2025-06-23 ENCOUNTER — APPOINTMENT (OUTPATIENT)
Age: 55
End: 2025-06-23
Payer: MEDICAID

## 2025-06-23 VITALS
TEMPERATURE: 98.7 F | WEIGHT: 272.4 LBS | HEART RATE: 93 BPM | BODY MASS INDEX: 42.65 KG/M2 | SYSTOLIC BLOOD PRESSURE: 158 MMHG | DIASTOLIC BLOOD PRESSURE: 101 MMHG

## 2025-06-23 DIAGNOSIS — N34.0 PERIURETHRAL ABSCESS: Primary | ICD-10-CM

## 2025-06-23 DIAGNOSIS — M79.5 FOREIGN BODY (FB) IN SOFT TISSUE: ICD-10-CM

## 2025-06-23 PROCEDURE — 3077F SYST BP >= 140 MM HG: CPT | Performed by: UROLOGY

## 2025-06-23 PROCEDURE — 3080F DIAST BP >= 90 MM HG: CPT | Performed by: UROLOGY

## 2025-06-23 PROCEDURE — 3046F HEMOGLOBIN A1C LEVEL >9.0%: CPT | Performed by: UROLOGY

## 2025-06-23 PROCEDURE — 99214 OFFICE O/P EST MOD 30 MIN: CPT | Performed by: UROLOGY

## 2025-06-23 PROCEDURE — 4010F ACE/ARB THERAPY RXD/TAKEN: CPT | Performed by: UROLOGY

## 2025-06-23 NOTE — PROGRESS NOTES
CHIEF COMPLAINT:  Patient presents to the office today to discuss:  1. Catheter removal  2. Retained suture needle  3. Follow-up after urethral repair    PAST UROLOGICAL HISTORY:  52-year-old man with history of recurrent periurethral abscesses and urethral stricture. Underwent buccal graft urethroplasty in April 2021. Continued to have periurethral abscesses. Most recently had exploration performed by Dr. Ritchie with intraoperative urethral injury that was primarily repaired. Recent pericath RUG showed no urethral leak but revealed retained suture needle. Review of imaging indicates suture needle has been present since 2021. At end of original surgery, needle count showed missing needle, but intraoperative X-ray was negative for retained needle.    HPI TODAY 06/23/2025:  - Presents for catheter removal.  - No current complaints.  - Recent cystogram performed last week showed no urethral leak but revealed retained suture needle.  - Review of previous imaging confirms needle has been present since 2021.    PHYSICAL EXAMINATION:  Constitutional: Obese man in no acute distress.  : Catheter in place. Well-healed transverse scrotal incision.    ASSESSMENT AND PLAN:  52-year-old man with history of recurrent periurethral abscesses, urethral stricture, and retained suture needle.    1. Urethral stricture (N35.9)  - Assessment: Status post urethral repair by Dr. Ritchiewith catheter in place.  - Plan: Catheter removed today. CT scan with contrast ordered to evaluate for residual fluid collections/abscesses.  - Counseling: Advised may experience bladder spasms which will resolve.    2. Retained suture needle (T81.539A)  - Assessment: Retained suture needle identified on imaging, present since 2021.  - Plan: Will evaluate need for removal based on upcoming CT scan results. If no abscess present, will consider removal as inert foreign body. Case reported to risk management team.  - Counseling: Discussed that while needle may  not be causing symptoms, it represents a medical error that needs to be addressed.     3. Periurethral abscess, recurrent (N49.8)  - Assessment: History of recurrent periurethral abscesses.  - Plan: Will evaluate for residual abscess on upcoming CT scan.    ORDERS:  CT abdomen/pelvis with contrast.    FOLLOW UP:  Return in approximately 4-5 weeks after CT scan. Cleared to return to work without restrictions.    SHORT SUMMARY:  Catheter removed in man with history of urethral repair and retained suture needle. CT ordered to evaluate for residual abscess; will determine need for needle removal based on results.

## 2025-06-24 ENCOUNTER — APPOINTMENT (OUTPATIENT)
Dept: RADIOLOGY | Facility: HOSPITAL | Age: 55
End: 2025-06-24
Payer: MEDICAID

## 2025-06-27 ENCOUNTER — APPOINTMENT (OUTPATIENT)
Dept: UROLOGY | Facility: CLINIC | Age: 55
End: 2025-06-27
Payer: COMMERCIAL

## 2025-06-30 DIAGNOSIS — N34.0 URETHRAL ABSCESS: Primary | ICD-10-CM

## 2025-07-02 ENCOUNTER — LAB (OUTPATIENT)
Dept: LAB | Facility: HOSPITAL | Age: 55
End: 2025-07-02
Payer: MEDICAID

## 2025-07-02 LAB
CREAT SERPL-MCNC: 0.81 MG/DL (ref 0.5–1.3)
EGFRCR SERPLBLD CKD-EPI 2021: >90 ML/MIN/1.73M*2

## 2025-07-02 PROCEDURE — 82565 ASSAY OF CREATININE: CPT

## 2025-07-08 ENCOUNTER — HOSPITAL ENCOUNTER (OUTPATIENT)
Dept: RADIOLOGY | Facility: HOSPITAL | Age: 55
Discharge: HOME | End: 2025-07-08
Payer: MEDICAID

## 2025-07-08 DIAGNOSIS — N34.0 PERIURETHRAL ABSCESS: ICD-10-CM

## 2025-07-08 PROCEDURE — 72193 CT PELVIS W/DYE: CPT

## 2025-07-08 PROCEDURE — 2550000001 HC RX 255 CONTRASTS: Performed by: UROLOGY

## 2025-07-08 RX ADMIN — IOHEXOL 75 ML: 350 INJECTION, SOLUTION INTRAVENOUS at 08:09

## 2025-07-10 ENCOUNTER — OFFICE VISIT (OUTPATIENT)
Age: 55
End: 2025-07-10
Payer: MEDICAID

## 2025-07-10 VITALS
SYSTOLIC BLOOD PRESSURE: 144 MMHG | BODY MASS INDEX: 42.42 KG/M2 | WEIGHT: 270.28 LBS | DIASTOLIC BLOOD PRESSURE: 96 MMHG | HEIGHT: 67 IN | HEART RATE: 97 BPM

## 2025-07-10 DIAGNOSIS — E11.9 INSULIN DEPENDENT TYPE 2 DIABETES MELLITUS (MULTI): ICD-10-CM

## 2025-07-10 DIAGNOSIS — Z79.4 INSULIN DEPENDENT TYPE 2 DIABETES MELLITUS (MULTI): ICD-10-CM

## 2025-07-10 DIAGNOSIS — E10.9 TYPE 1 DIABETES MELLITUS WITHOUT COMPLICATION: Primary | ICD-10-CM

## 2025-07-10 LAB — POC FINGERSTICK BLOOD GLUCOSE: 145 MG/DL (ref 70–100)

## 2025-07-10 PROCEDURE — 4010F ACE/ARB THERAPY RXD/TAKEN: CPT | Performed by: INTERNAL MEDICINE

## 2025-07-10 PROCEDURE — 3008F BODY MASS INDEX DOCD: CPT | Performed by: INTERNAL MEDICINE

## 2025-07-10 PROCEDURE — 3077F SYST BP >= 140 MM HG: CPT | Performed by: INTERNAL MEDICINE

## 2025-07-10 PROCEDURE — 99204 OFFICE O/P NEW MOD 45 MIN: CPT | Performed by: INTERNAL MEDICINE

## 2025-07-10 PROCEDURE — 99214 OFFICE O/P EST MOD 30 MIN: CPT | Performed by: INTERNAL MEDICINE

## 2025-07-10 PROCEDURE — 3080F DIAST BP >= 90 MM HG: CPT | Performed by: INTERNAL MEDICINE

## 2025-07-10 PROCEDURE — 3046F HEMOGLOBIN A1C LEVEL >9.0%: CPT | Performed by: INTERNAL MEDICINE

## 2025-07-10 PROCEDURE — 82962 GLUCOSE BLOOD TEST: CPT | Performed by: INTERNAL MEDICINE

## 2025-07-10 RX ORDER — DEXTROSE 4 G
TABLET,CHEWABLE ORAL
Qty: 1 EACH | Refills: 0 | Status: SHIPPED | OUTPATIENT
Start: 2025-07-10

## 2025-07-10 RX ORDER — BLOOD-GLUCOSE SENSOR
EACH MISCELLANEOUS
Qty: 6 EACH | Refills: 3 | Status: SHIPPED | OUTPATIENT
Start: 2025-07-10

## 2025-07-10 RX ORDER — INSULIN GLARGINE 100 [IU]/ML
80 INJECTION, SOLUTION SUBCUTANEOUS NIGHTLY
Qty: 24 ML | Refills: 11 | Status: SHIPPED | OUTPATIENT
Start: 2025-07-10 | End: 2026-07-10

## 2025-07-10 RX ORDER — LANCETS 33 GAUGE
1 EACH MISCELLANEOUS 4 TIMES DAILY
Qty: 120 EACH | Refills: 11 | Status: SHIPPED | OUTPATIENT
Start: 2025-07-10

## 2025-07-10 RX ORDER — BLOOD-GLUCOSE,RECEIVER,CONT
EACH MISCELLANEOUS
Qty: 1 EACH | Refills: 0 | Status: SHIPPED | OUTPATIENT
Start: 2025-07-10

## 2025-07-10 RX ORDER — IBUPROFEN 200 MG
CAPSULE ORAL
Qty: 120 STRIP | Refills: 11 | Status: SHIPPED | OUTPATIENT
Start: 2025-07-10

## 2025-07-10 RX ORDER — PEN NEEDLE, DIABETIC 30 GX3/16"
1 NEEDLE, DISPOSABLE MISCELLANEOUS DAILY
Qty: 90 EACH | Refills: 3 | Status: SHIPPED | OUTPATIENT
Start: 2025-07-10 | End: 2026-07-10

## 2025-07-10 ASSESSMENT — ENCOUNTER SYMPTOMS
DEPRESSION: 0
LOSS OF SENSATION IN FEET: 1
OCCASIONAL FEELINGS OF UNSTEADINESS: 0

## 2025-07-10 ASSESSMENT — PATIENT HEALTH QUESTIONNAIRE - PHQ9: 1. LITTLE INTEREST OR PLEASURE IN DOING THINGS: NOT AT ALL

## 2025-07-10 NOTE — PROGRESS NOTES
Patient is seen at the request of self referred for my opinion regarding Diabetes mellitus management. My final recommendations will be communicated back to the requesting provider by way of shared medical record.    Subjective   Elliot Earl is a 55 y.o. male with a hx of HTN, HLD, Obesity, periurethral abscess, urethrocutaneous fistula,  who presents for evaluation of insulin dependent DM.    Dx: 2010 with fatigue, and polyuria/polydipsia. Followed by PCP.  HbA1c: 11%  Current regimen:   - Lantus 80 units daily. Not taking on weekends.    Past medications:   -Januvia  -Metformin: Diarrhea.    Complications:  Retinopathy: No known Retinopathy, due for optho visit, has not seen in years.  Neuropathy: Bilateral lower extremity neuropathy.  Nephropathy: due for urine alb/cr ratio today.    Comorbidities:  - Lipids: due  - Blood pressure: 144/96  - BMI: 42.33    SMBG: Not checking, needs new meter.    Hypoglycemia awareness: N/A    Diet: 3 meals per day and snacks in between. Non restricted diet.    Exercise: none    Social Hx:  -  opperator.  - Drinking 2 fifths of vodka every weekend.    Fhx:  Diabetes in paternal side of family, on insulin but unknown if type 1 or 2.    ROS  General: no fever or chills  CV: no chest pain   Respiratory: no shortness of breath  MSK: no lower extremity edema  Neuro: no headache or dizziness  See HPI for Endocrine ROS    Medical History[1]    Surgical History[2]    Social History     Socioeconomic History    Marital status:      Spouse name: Not on file    Number of children: Not on file    Years of education: Not on file    Highest education level: Not on file   Occupational History    Not on file   Tobacco Use    Smoking status: Every Day     Current packs/day: 1.00     Average packs/day: 1 pack/day for 20.0 years (20.0 ttl pk-yrs)     Types: Cigarettes    Smokeless tobacco: Not on file   Vaping Use    Vaping status: Never Used   Substance and Sexual  Activity    Alcohol use: Yes    Drug use: Not Currently     Types: Marijuana    Sexual activity: Yes   Other Topics Concern    Not on file   Social History Narrative    Not on file     Social Drivers of Health     Financial Resource Strain: Low Risk  (5/22/2025)    Overall Financial Resource Strain (CARDIA)     Difficulty of Paying Living Expenses: Not hard at all   Food Insecurity: No Food Insecurity (5/22/2025)    Hunger Vital Sign     Worried About Running Out of Food in the Last Year: Never true     Ran Out of Food in the Last Year: Never true   Transportation Needs: No Transportation Needs (5/22/2025)    PRAPARE - Transportation     Lack of Transportation (Medical): No     Lack of Transportation (Non-Medical): No   Physical Activity: Unknown (5/22/2025)    Exercise Vital Sign     Days of Exercise per Week: 0 days     Minutes of Exercise per Session: Not on file   Stress: No Stress Concern Present (5/22/2025)    [employer] [place] [employer] - Occupational Stress Questionnaire     Feeling of Stress : Not at all   Social Connections: Socially Isolated (5/22/2025)    Social Connection and Isolation Panel [NHANES]     Frequency of Communication with Friends and Family: More than three times a week     Frequency of Social Gatherings with Friends and Family: Once a week     Attends Congregation Services: Never     Active Member of Clubs or Organizations: No     Attends Club or Organization Meetings: Never     Marital Status: Never    Intimate Partner Violence: Not At Risk (5/22/2025)    Humiliation, Afraid, Rape, and Kick questionnaire     Fear of Current or Ex-Partner: No     Emotionally Abused: No     Physically Abused: No     Sexually Abused: No   Housing Stability: Low Risk  (5/22/2025)    Housing Stability Vital Sign     Unable to Pay for Housing in the Last Year: No     Number of Times Moved in the Last Year: 0     Homeless in the Last Year: No       Objective    Physical Exam  Blood pressure (!)  "144/96, pulse 97, height 1.702 m (5' 7\"), weight 123 kg (270 lb 4.5 oz).  General: not in acute distress  HEENT: LYDIA RUSH  Thyroid: no goiter  Neuro: alert and oriented x 3    Current Medications[3]    No data recorded   No data recorded     Assessment/Plan   Elliot Earl is a 55 y.o. male with a hx of HTN, HLD, Obesity, periurethral abscess, urethrocutaneous fistula,  who presents for evaluation of insulin dependent DM.  Fasting BG checked during visit: 145    Type 1 vs 2  HbA1c: 11%  Current regimen:  Lantus 80 Units SC Daily    Eye exam: No known retinopathy, due. Referral in.  Urine microalbumin: No known proteinuria, due  Podiatry: Bilateral lower extremity neuropathy, patient to schedule.  Lipids: due, taking crestor 40mg    A1c: 11%, poor BG control and not checking FSBG at home. Non compliance with insulin and diet.  Unclear if patient Type 1 or 2, will check remaining endocrine pancreatic function and consider starting a GLP1 agonist pending results. Patient provided with [employer]estyle Bull sample but unable to use at work given policy to not allow [employer] phones, will need .  Patient to see DSME next week for CGM education, f/u with endo pharm in 1 month for BG review.     PLAN:  - Continue current Glargine dose  - will check abs and c peptide/ insulin levels and consider possiblity of starting GLP1  - Start monitoring bg with bull cgm and will review during follow up jose.  - May require prandial insulin.      Follow up in 1 week DSME, 1 month Endo Pharm D         [1]   Past Medical History:  Diagnosis Date    Acute myocardial infarction, unspecified 11/11/2020    Acute MI    Complex tear of medial meniscus, current injury, right knee, initial encounter 06/17/2020    Complex tear of medial meniscus of right knee as current injury, initial encounter    Cutaneous abscess of perineum 01/04/2021    Perineal abscess    Diabetes mellitus (Multi)     Effusion, right knee 09/15/2020    Knee " effusion, right    Encounter for other specified aftercare 05/04/2021    Visit for wound check    Low back pain, unspecified 06/20/2018    Lumbar pain    Low back pain, unspecified 01/16/2019    Lumbar pain    Other conditions influencing health status 12/17/2020    History of cough    Other specified health status     No pertinent past medical history    Pain in left knee     Left knee pain, unspecified chronicity    Personal history of other diseases of male genital organs 07/09/2020    History of balanitis    Radiculopathy, lumbar region 05/03/2019    Acute lumbar radiculopathy    Urinary tract infection, site not specified 05/04/2021    Acute urinary tract infection   [2]   Past Surgical History:  Procedure Laterality Date    OTHER SURGICAL HISTORY  07/09/2020    Knee surgery    OTHER SURGICAL HISTORY  07/09/2020    Abscess incision and drainage   [3]   Current Outpatient Medications:     amLODIPine (Norvasc) 10 mg tablet, Take 1 tablet (10 mg) by mouth once daily., Disp: 30 tablet, Rfl: 0    insulin glargine (Lantus Solostar U-100 Insulin) 100 unit/mL (3 mL) pen, Inject 80 Units under the skin once daily at bedtime., Disp: 30 mL, Rfl: 0    losartan (Cozaar) 100 mg tablet, Take 1 tablet (100 mg) by mouth once daily., Disp: 30 tablet, Rfl: 0    metoprolol succinate XL (Toprol-XL) 25 mg 24 hr tablet, Take 1 tablet (25 mg) by mouth once daily., Disp: 30 tablet, Rfl: 0    rosuvastatin (Crestor) 40 mg tablet, Take 1 tablet (40 mg) by mouth once daily., Disp: 30 tablet, Rfl: 0    oxyBUTYnin (Ditropan) 5 mg tablet, Take 1 tablet (5 mg) by mouth 3 times a day. (Patient not taking: Reported on 7/10/2025), Disp: 90 tablet, Rfl: 5    oxyCODONE (Roxicodone) 5 mg immediate release tablet, Take 1 tablet (5 mg) by mouth every 6 hours if needed for severe pain (7 - 10). (Patient not taking: Reported on 7/10/2025), Disp: 15 tablet, Rfl: 0

## 2025-07-15 ENCOUNTER — TELEPHONE (OUTPATIENT)
Age: 55
End: 2025-07-15
Payer: MEDICAID

## 2025-07-15 ENCOUNTER — APPOINTMENT (OUTPATIENT)
Age: 55
End: 2025-07-15
Payer: MEDICAID

## 2025-07-15 NOTE — TELEPHONE ENCOUNTER
Patient cancelled his DSME and education for CGM for today.  Reschedule for 7/24/2025.  Unable to come into the office this week for education.   Asking when will he see his endocrinologist again.  He has no follow up appointment.     Patient is currently testing his BG PRN in am here and there.  Instructed patient on the need to test bid-tid since he is on insulin and why.  Instructed to test FBG, before dinner and hs is preferred.  Or FBG and alternate between before dinner or hs and to bring in his meter to his DSME session.  Patient will use reader for his CGM, d/t work issues with cphone on patient during work.     I will arrange for office to schedule 1 month follow up with an endocrinologist.    Contact office with any questions/ concerns - phone number DSME provided.     Kanika Damon RN, BSN, [facility] [place]

## 2025-07-24 ENCOUNTER — NUTRITION (OUTPATIENT)
Age: 55
End: 2025-07-24
Payer: MEDICAID

## 2025-07-24 DIAGNOSIS — E10.65 UNCONTROLLED TYPE 1 DIABETES MELLITUS WITH HYPERGLYCEMIA: Primary | ICD-10-CM

## 2025-07-24 PROCEDURE — 99211 OFF/OP EST MAY X REQ PHY/QHP: CPT

## 2025-07-24 NOTE — PROGRESS NOTES
Reason for Visit:  Elliot Earl is a 55 y.o. male who presents for Initial DSME Visit    DSME - Global Assessment    Referring Provider:  Ruiz Don MD   Marital Status: .  Support Person: Name: Joann and Relationship to patient: spouse.    What do you hope to gain from this diabetes education visit? Not really sure    Have you had diabetes education in the past?  No.  In Your words, what is Diabetes: I need more insulin,  does not have understanding of why insulin it needed  What Concerns you most about having diabetes: nothing    Readiness to Learn: demonstrates willingness to learn, not motivated to learn, and needs reinforcement  Preferred learning method: reading, listening, and doing    Household Composition: living independently, with family    Demographics:   Difficulties with: None  Highest Level of Education: Trade School  Race/Ethnic Origin: /Black  Occupation:    operator.   Work hours: evening shift    Health Status:  Smoking/Tobacco Use: No, patient does not smoke or use tobacco.  Alcohol Use: Yes, patient drinks alcohol. Frequency: every week end. Amount: 2 fifths.    Type of Diabetes: unsure at this time.  Maybe type 2 per patient.  Patient notified he needs to have his labs done to confirm his diabetes type.  What year were you diagnosed with diabetes: 2010  Family History: yes    Comorbidities/Chronic Complications: hypertension, neuropathy, hyperlipidemia, cardiovascular disease, and Periurethral abscess    Lab Results   Component Value Date    HGBA1C 11.0 (H) 05/22/2025    HGBA1C 13.9 (H) 02/10/2025    HGBA1C 11.6 (H) 11/10/2023    HGBA1C 12.0 (H) 10/21/2022    HGBA1C 9.2 03/31/2021    HGBA1C 12.2 10/19/2020    HGBA1C 13.1 05/15/2020    HGBA1C 7.1 09/30/2019    HGBA1C 12.0 05/17/2019       Health Utilization Past 12 Months:   Hospital Admissions: Yes. Number of Visits: 3.  ER Visits: Yes. Number of Visits: multiple.  Primary Care Visits:  Yes.  Last Eye Exam : patient reports annual screening by optometry/opthalmology  Podiatry : NA  Dentist : [facility]    Diabetes Self-Management Skills and Behaviors:   Do you exercise regularly?: Yes. 5+ times/week.  Physical Activity : walking at work  No. Average amount of hours slept per night: varies  How do you manage your diabetes when you are sick?: unsure    Diabetes Medications: insulin pens  Current Medications[1]Lantus 80 units  Injection/Infusion Sites: abdominal wall. Appropriate disposal of sharps. Appropriate storage of insulin.    Monitorng: blood glucose meter: True Result meter  Average 14 day   Average 7 day BG= 127   FBG Noon Dinner  10:00p(coming home from work)  7/24 136  7/23       73  patient had regular soda during the night.    7/22  188     94  7/21 158     153  7/18 86  7/16 113     118  7/15 176      101  7/13  146      Acute Complications-Safety: none    Hypoglycemia: None  Hypoglycemia Treatment: sweets    Hyperglycemia: None unknown  Hyperglycemia Treatment: unsure    Diabetes Assessment:   DM Interferes with other aspects of my life: neutral.  My level of stress is high: agree.  Work related  I struggle with making changes in my life: agree.  How do you handle stress: sometimes eating, on week ends increase vodka  Most difficult part of managing DM: unknown at this time    DSME - Meal Planning and Diet Recall  Are you currently following any meal plan: No Plan.    Does your culture or Mandaeism require any of the following: none  Who does the grocery shopping? patient  Who does the cooking in the home? patient    How often do you eat out? rarely  How many meals do you eat in per day: two.- three  Which meals do you tend to skip: breakfast  What do you drink with and between meals: water, sugary drinks, and tea    Diet Recall:   Breakfast:  usually 10 a  skips or  Monte and eggs and toast or  5-6 cups of cereal with 3/4c 1% milk  Lunch usually about 6p  full meal meat, veggies  and starch  Dinner  usually 10:30-11p other part of his lunch left overs.    May snack on chips here and there- not often.    DSME - Goals and Recommendations    [facility] Diabetes Education Program SMART Behavior Goal Setting:        S - Specific: Exactly what do you want to do        M - Measurable: Use a calendar or chart to track progress        A - Attainable: Take small steps to make bigger changes        R - Realistic: Pick something reasonable that you know you can do        T - Time Oriented: Choose a time limit (No longer than 6 months)    Specific Goal:   I will work on testing at least bid FBG and HS post dinner  I will work on decreasing his cereal portion to half and adding protein     Measurable: How will I track my goal:  I will keep track of my progress daily by uncertain.    Time Oriented: two weeks.    Topics Covered and Impression:    DSME Topics Covered During Visit:   Understanding Diabetes Basics  Reducing Your Risk For Other Stanton Concerns  Reviewed Chronic Complications/Risks Related to Diabetes  Routine Health Assessment and Labs  Reviewing Medication Classes  Taking Medications as Prescribed  Insulin Pen Teaching  Using a Blood Sugar Monitor  MyPlate Method    Reviewed Diabetes Technology: Patient employer will not allow any monitoring devices for patient to wear in the area he works.  Unable to use CGM.       DSME Topics for Follow-Up:   A1c Review  Learning to [place] with Stress, Depression and Other Concerns  Reducing Your Risk For Other Stanton Concerns  Reviewed Chronic Complications/Risks Related to Diabetes  Review Glycemic Goals (CGM or SMBG)  Reviewed Hypoglycemia Signs/Symptoms/Tx Plan  Reviewed Hyperglycemia Signs/Symptoms/Tx Plan  Healthy Meal Plan  MyPlate Method  Diabetes / Medication Alert Identification    Materials provided during today's visit:   [employer] Education+ Resources Diabetes Planning healthy meals 2022    [facility] Diabetes Management Guide    42 Factors that affect Blood Glucose DiaTribe     Provider Impression: Patient dx with type 1 diabetes approximately 15 years ago, with minimal formal diabetes education- only sporadic guidance over the years.  Patient only doing BGM 1-2/d and typically prn.  Dietary habits include high carbohydrate meals- lacking  understanding of the impact of specific foods on glycemic control. Following prescribed insulin plan, but does not use CGM due to work related restrictions.  Patient works evening shift.  Discuss what is type 1 diabetes and importance of regular BGM for type 1, explaining how values inform insulin dosing and long-term glucemic control.  Emphasized the health risks of uncontrol diabetes, even in the absence of obvious physical symptoms. Patient expressed limited insight into the seriousness of his condition but showed willingness to make small change and engage with care planning. Patient wife is supportive and willing to assist patient with his diet plan changes.  Patient agreed to begin BG bid to improve data for insulin adjustment and self awareness.  Advise patient to reduce morning carb's intake ( specifically cereal) by half and introduce protein to improve glycemic impact.  Explain rational for maintaining current insulin regimen due to insufficient BG data.  Recognized the need for consistent reinforce to sustain engagement and motivation.  Planned close follow up with short term goals to provide structure and promote adherence.  On going education to cover basic diabetes concepts, Carbohydrate counting, medications, activity  and symptom awareness. Recommend follow up in 1 month.       Time: I personally spent a total of 60 minutes with the patient providing diabetes self-management education. Visit documentation will be sent electronically to referring provider.     Contact office with any questions/ concerns - phone number DSME provided.   Review BGM report with  Ruiz Don MD- no  "medication changes at this time.       Provider in office today:  MD Kanika Carpenter RN, BSN, [facility]              [1]   Current Outpatient Medications   Medication Sig Dispense Refill    amLODIPine (Norvasc) 10 mg tablet Take 1 tablet (10 mg) by mouth once daily. 30 tablet 0    blood sugar diagnostic (Blood Glucose Test) Check blood glucose 4 time per day 120 strip 11    blood-glucose meter misc Use to check blood glucose 1 each 0    blood-glucose sensor (FreeStyle Sean 3 Plus Sensor) device Change every 15 days 6 each 3    FreeStyle Sean 3 [place] misc Use as instructed 1 each 0    insulin glargine (Lantus Solostar U-100 Insulin) 100 unit/mL (3 mL) pen Inject 80 Units under the skin once daily at bedtime. 24 mL 11    lancets 33 gauge misc 1 Lancet 4 times a day. 120 each 11    losartan (Cozaar) 100 mg tablet Take 1 tablet (100 mg) by mouth once daily. 30 tablet 0    metoprolol succinate XL (Toprol-XL) 25 mg 24 hr tablet Take 1 tablet (25 mg) by mouth once daily. 30 tablet 0    oxyBUTYnin (Ditropan) 5 mg tablet Take 1 tablet (5 mg) by mouth 3 times a day. (Patient not taking: Reported on 7/10/2025) 90 tablet 5    oxyCODONE (Roxicodone) 5 mg immediate release tablet Take 1 tablet (5 mg) by mouth every 6 hours if needed for severe pain (7 - 10). (Patient not taking: Reported on 7/10/2025) 15 tablet 0    pen needle, diabetic 32 gauge x 5/32\" needle 1 Pen needle once daily. 90 each 3    rosuvastatin (Crestor) 40 mg tablet Take 1 tablet (40 mg) by mouth once daily. 30 tablet 0     No current facility-administered medications for this visit.     "

## 2025-08-01 ENCOUNTER — OFFICE VISIT (OUTPATIENT)
Dept: ORTHOPEDIC SURGERY | Facility: CLINIC | Age: 55
End: 2025-08-01
Payer: MEDICAID

## 2025-08-01 DIAGNOSIS — M17.0 OSTEOARTHRITIS OF BOTH KNEES, UNSPECIFIED OSTEOARTHRITIS TYPE: Primary | ICD-10-CM

## 2025-08-01 PROCEDURE — 99214 OFFICE O/P EST MOD 30 MIN: CPT | Performed by: EMERGENCY MEDICINE

## 2025-08-01 RX ORDER — MELOXICAM 15 MG/1
15 TABLET ORAL DAILY
Qty: 30 TABLET | Refills: 1 | Status: SHIPPED | OUTPATIENT
Start: 2025-08-01 | End: 2025-09-30

## 2025-08-01 NOTE — PROGRESS NOTES
Subjective   Patient ID: Elliot Earl is a 55 y.o. male who presents for Follow-up of the Left Knee and Follow-up of the Right Knee.  History of Present Illness  The patient presents for evaluation of bilateral knee pain.    He reports experiencing bilateral knee pain, with the right knee being more affected than the left. This is consistent with his long-standing diagnosis of chronic degenerative joint disease (DJD) arthritis. He localizes the pain primarily over the medial joint line and laterally over the distal IT band. He is interested in exploring viscosupplementation injections as a potential treatment option. He also inquires about the possibility of obtaining a brace and is currently using a loose compression sleeve.     He has previously undergone viscosupplementation under the care of Dr. Zaidi, which provided him with approximately a year of relief. He has expressed interest in repeating this treatment. He has previously received a three-series injection and is considering another round. He has requested prescriptions for Oxycodone and Percocet but has declined cortisone due to previous unsuccessful experiences. Currently, he is not taking any medication for pain management. His last consultation was in 04/2024, during which the possibility of gel injections was discussed. However, due to insurance issues, he was unable to proceed with this treatment at that time. He has previously tried physical therapy and various analgesics including Advil, Tylenol, Aleve, and Voltaren gel.    All other systems have been reviewed and are negative for complaint.      Objective     There were no vitals taken for this visit.     Physical Exam  - Musculoskeletal:    - Right knee:      - Full range of motion      - Negative valgus      - Negative varus      - Negative Lachman      - Patellar grind positive    - Left knee:      - Full flexion and extension      - Negative valgus stress      - Negative varus  stress      - Negative Lachman      - Apprehension positive      - Patellar grind positive    Imaging:   I have personally reviewed and agree with Radiologist interpretation of previous imaging studies performed prior to this visit.   STUDY:  Bilateral knees, 4 views each.     INDICATION:  jose knee pain  M25.561: Pain in both knees, unspecified chronicity  M25.562:.     COMPARISON:  Bilateral knee radiographs dated 03/06/2020.     ACCESSION NUMBER(S):  [id number]     ORDERING CLINICIAN:  TONIO FERNANDEZ     FINDINGS:  Left knee:  No acute fracture or malalignment.  Postsurgical changes of the left knee, with prior ghost tracts and  retained wires visualized and appearing similar to prior.  Similar appearance of the previously described remote fracture of the  proximal fibula.  Marked tricompartmental degenerative changes, most severe at the  patellofemoral joint compartment.  No significant knee joint effusion.  Soft tissues are unremarkable.     Right knee:  No acute fracture or malalignment.  Progression of tricompartment degenerative changes, now  mild-to-moderate.  Similar appearance of enthesophytes along the superior pole of the  patella.  No significant knee joint effusion.  Soft tissues are unremarkable.        Prominent vascular calcifications.     Impression  1. Similar appearance of the previously described marked  tricompartmental degenerative changes of the left knee, most severe  at the patellofemoral joint compartment.  2. Progression of tricompartmental degenerative changes at the right  knee, now mild-to-moderate.  3. Similar appearance of the previously described remote fracture of  the proximal fibula.     I personally reviewed the images/study and I agree with the findings  as stated. This study was interpreted at [facility], [place], [place].        1. Osteoarthritis of both knees, unspecified osteoarthritis type  meloxicam (Mobic) 15 mg tablet    CANCELED: Knee  Brace, Reaction          Assessment & Plan  1. Bilateral knee pain  - Reports chronic bilateral knee pain, right knee worse than left, consistent with long-standing DJD arthritis  - Previously had viscosupplementation with Dr. Zaidi, which provided about a year of relief  - Examination findings:    - Tenderness to palpation over medial and lateral joint lines, patella, and patellar tendon on both knees    - Right knee: Full range of motion, negative valgus, negative varus, negative Lachman    - Left knee: Full flexion and extension, negative valgus stress, negative varus stress, negative Lachman    - Patellar grind positive in both knees  - Prescribed short course of meloxicam once daily for a couple of months, with caution due to diabetes and potential renal impact  - Advised to take meloxicam with food and earlier in the day to avoid nighttime reflux  - Recommended over-the-counter Voltaren gel for application on knees four times daily instead of the meloxicam when possible  - Initiate process for gel injections  - We did offer bilateral knee web reaction braces, however patient declined stating that he has these already.    Byron Osborne,          This medical note was created with the assistance of artificial intelligence (AI) for documentation purposes. The content has been reviewed and confirmed by the healthcare provider for accuracy and completeness. Patient consented to the use of audio recording and use of AI during their visit.

## 2025-08-04 ENCOUNTER — APPOINTMENT (OUTPATIENT)
Age: 55
End: 2025-08-04
Payer: MEDICAID

## 2025-08-11 ENCOUNTER — APPOINTMENT (OUTPATIENT)
Age: 55
End: 2025-08-11
Payer: MEDICAID

## 2025-08-11 VITALS — SYSTOLIC BLOOD PRESSURE: 167 MMHG | HEART RATE: 98 BPM | DIASTOLIC BLOOD PRESSURE: 104 MMHG

## 2025-08-11 DIAGNOSIS — N36.0 URETHROCUTANEOUS FISTULA: ICD-10-CM

## 2025-08-11 DIAGNOSIS — N52.01 ERECTILE DYSFUNCTION DUE TO ARTERIAL INSUFFICIENCY: ICD-10-CM

## 2025-08-11 DIAGNOSIS — R79.89 LOW TESTOSTERONE: ICD-10-CM

## 2025-08-11 DIAGNOSIS — M79.5 FOREIGN BODY (FB) IN SOFT TISSUE: ICD-10-CM

## 2025-08-11 DIAGNOSIS — Z12.5 PROSTATE CANCER SCREENING: ICD-10-CM

## 2025-08-11 DIAGNOSIS — L02.215 ABSCESS OF PERINEUM: ICD-10-CM

## 2025-08-11 DIAGNOSIS — N34.0 PERIURETHRAL ABSCESS: Primary | ICD-10-CM

## 2025-08-11 PROCEDURE — 4010F ACE/ARB THERAPY RXD/TAKEN: CPT | Performed by: UROLOGY

## 2025-08-11 PROCEDURE — 3077F SYST BP >= 140 MM HG: CPT | Performed by: UROLOGY

## 2025-08-11 PROCEDURE — 99214 OFFICE O/P EST MOD 30 MIN: CPT | Performed by: UROLOGY

## 2025-08-11 PROCEDURE — 3080F DIAST BP >= 90 MM HG: CPT | Performed by: UROLOGY

## 2025-08-11 RX ORDER — SILDENAFIL 100 MG/1
100 TABLET, FILM COATED ORAL AS NEEDED
Qty: 12 TABLET | Refills: 3 | Status: SHIPPED | OUTPATIENT
Start: 2025-08-11 | End: 2025-09-10

## 2025-12-02 ENCOUNTER — APPOINTMENT (OUTPATIENT)
Dept: NEUROLOGY | Facility: CLINIC | Age: 55
End: 2025-12-02
Payer: MEDICAID

## 2025-12-26 ENCOUNTER — APPOINTMENT (OUTPATIENT)
Dept: UROLOGY | Facility: CLINIC | Age: 55
End: 2025-12-26
Payer: MEDICAID

## (undated) DEVICE — SOLUTION, IRRIGATION, USP, 0.9% SODIUM CHL, 500ML

## (undated) DEVICE — Device

## (undated) DEVICE — PREP TRAY, SKIN, DRY, STANDARD

## (undated) DEVICE — DRAIN, PENROSE, 0.25 X 12 IN, LF

## (undated) DEVICE — TUBING, SUCTION, 9 FT, STERILE, CLEAR

## (undated) DEVICE — SUTURE, VICRYL PLUS 3-0, SH, 27IN

## (undated) DEVICE — SPONGE, GAUZE, AVANT, STERILE, NONWOVEN, 4PLY, 4 X 4, STANDARD

## (undated) DEVICE — WOUND SYSTEM, DEBRIDEMENT & CLEANING, O.R DUOPAK

## (undated) DEVICE — SUTURE, ETHILON, 2-0, 18 IN, FS, BLACK, BX/12

## (undated) DEVICE — BAG, DRAINAGE, ANTI-REFLUX CHAMBER, 2000ML

## (undated) DEVICE — SUTURE, VICRYL, 4-0, 70CM, TAPER SH

## (undated) DEVICE — TIP,  ELECTRODE COATED INSULATED, EXTENDED, LF

## (undated) DEVICE — GLOVE, SURGICAL, PROTEXIS PI MICRO, 7.0, PF, LF

## (undated) DEVICE — SOLUTION, IRRIGATION, USP, STERILE WATER, 500ML, BOTTLE

## (undated) DEVICE — STATLOCK, FOLEY SWIVEL, SILICONE TRICOT

## (undated) DEVICE — SOLUTION, IRRIGATION, USP, SODIUM CHLORIDE 0.9%, 3000 ML, BAG

## (undated) DEVICE — SUCTION TIP , YANKAUER, W/BULB SUCTION

## (undated) DEVICE — CATHETER, URETHRAL, FOLEY, 2 WAY, BARDEX LUBRICATH, SHORT LENGTH, 20 FR, 5 CC, LATEX

## (undated) DEVICE — ENDOSCOPE, ASCOPE 4 CYSCO, REVERSE DEFLECTION

## (undated) DEVICE — SYSTEM, EASY CATCHER, DISP, NON-STERILE

## (undated) DEVICE — IRRIGATION SET, CYSTOSCOPY, F/CONSTANT/INTERMITTENT, 8 GTT/CC, 77 IN